# Patient Record
Sex: MALE | Race: WHITE | Employment: OTHER | URBAN - METROPOLITAN AREA
[De-identification: names, ages, dates, MRNs, and addresses within clinical notes are randomized per-mention and may not be internally consistent; named-entity substitution may affect disease eponyms.]

---

## 2017-01-09 DIAGNOSIS — N40.1 BENIGN NON-NODULAR PROSTATIC HYPERPLASIA WITH LOWER URINARY TRACT SYMPTOMS: Primary | ICD-10-CM

## 2017-01-09 RX ORDER — TAMSULOSIN HYDROCHLORIDE 0.4 MG/1
0.4 CAPSULE ORAL DAILY
Qty: 90 CAPSULE | Refills: 3 | Status: ON HOLD | OUTPATIENT
Start: 2017-01-09 | End: 2017-02-22

## 2017-01-09 NOTE — TELEPHONE ENCOUNTER
tamsulosin- patient also on Oxybutin/ finasteride      Last Written Prescription Date: patient reported    Last Office Visit with [facility], [facility] or [facility] prescribing provider:  9/1/16   Future Office Visit:        BP Readings from Last 3 Encounters:   09/19/16 93/60   09/07/16 118/60   08/13/16 114/71

## 2017-01-17 DIAGNOSIS — E78.5 HYPERLIPIDEMIA LDL GOAL <100: Primary | ICD-10-CM

## 2017-01-17 DIAGNOSIS — I25.9 CHRONIC ISCHEMIC HEART DISEASE: ICD-10-CM

## 2017-01-17 NOTE — TELEPHONE ENCOUNTER
atorvastatin (LIPITOR) 20 MG tablet     Last Written Prescription Date: 9/22/16  Last Fill Quantity: 90, # refills: 0  Last Office Visit with [facility], [facility] or [facility] prescribing provider: 9/1/16       CHOL      189   6/27/2016  HDL       41   6/27/2016  LDL      108   6/27/2016  TRIG      198   6/27/2016  CHOLHDLRATIO      2.4   6/17/2015

## 2017-01-18 RX ORDER — ATORVASTATIN CALCIUM 20 MG/1
TABLET, FILM COATED ORAL
Qty: 90 TABLET | Refills: 0 | Status: ON HOLD | OUTPATIENT
Start: 2017-01-18 | End: 2017-02-22

## 2017-02-11 PROCEDURE — 96361 HYDRATE IV INFUSION ADD-ON: CPT

## 2017-02-21 ENCOUNTER — APPOINTMENT (OUTPATIENT)
Dept: GENERAL RADIOLOGY | Facility: CLINIC | Age: 82
End: 2017-02-21
Attending: FAMILY MEDICINE
Payer: MEDICARE

## 2017-02-21 ENCOUNTER — HOSPITAL ENCOUNTER (OUTPATIENT)
Facility: CLINIC | Age: 82
Setting detail: OBSERVATION
Discharge: HOME | End: 2017-02-22
Attending: FAMILY MEDICINE | Admitting: PEDIATRICS
Payer: MEDICARE

## 2017-02-21 DIAGNOSIS — R19.7 DIARRHEA OF PRESUMED INFECTIOUS ORIGIN: ICD-10-CM

## 2017-02-21 DIAGNOSIS — N40.1 BENIGN NON-NODULAR PROSTATIC HYPERPLASIA WITH LOWER URINARY TRACT SYMPTOMS: ICD-10-CM

## 2017-02-21 DIAGNOSIS — E86.0 DEHYDRATION: ICD-10-CM

## 2017-02-21 DIAGNOSIS — J10.1 INFLUENZA A: ICD-10-CM

## 2017-02-21 DIAGNOSIS — R09.02 HYPOXIA: ICD-10-CM

## 2017-02-21 PROBLEM — I95.9 HYPOTENSION, UNSPECIFIED HYPOTENSION TYPE: Status: ACTIVE | Noted: 2017-02-21

## 2017-02-21 PROBLEM — R53.1 WEAKNESS GENERALIZED: Status: ACTIVE | Noted: 2017-02-21

## 2017-02-21 PROBLEM — R93.89 ABNORMAL CHEST X-RAY: Status: ACTIVE | Noted: 2017-02-21

## 2017-02-21 PROBLEM — R26.9 ABNORMAL GAIT: Status: ACTIVE | Noted: 2017-02-21

## 2017-02-21 PROBLEM — N17.9 ACUTE KIDNEY INJURY (H): Status: ACTIVE | Noted: 2017-02-21

## 2017-02-21 LAB
ALBUMIN SERPL-MCNC: 2.9 G/DL (ref 3.4–5)
ALBUMIN UR-MCNC: ABNORMAL MG/DL
ALP SERPL-CCNC: 99 U/L (ref 40–150)
ALT SERPL W P-5'-P-CCNC: 17 U/L (ref 0–70)
AMORPH CRY #/AREA URNS HPF: ABNORMAL /HPF
ANION GAP SERPL CALCULATED.3IONS-SCNC: 10 MMOL/L (ref 3–14)
APPEARANCE UR: CLEAR
AST SERPL W P-5'-P-CCNC: 23 U/L (ref 0–45)
BACTERIA #/AREA URNS HPF: ABNORMAL /HPF
BASE DEFICIT BLDV-SCNC: 3.7 MMOL/L
BASOPHILS # BLD AUTO: 0 10E9/L (ref 0–0.2)
BASOPHILS NFR BLD AUTO: 0.1 %
BILIRUB SERPL-MCNC: 0.6 MG/DL (ref 0.2–1.3)
BILIRUB UR QL STRIP: NEGATIVE
BUN SERPL-MCNC: 22 MG/DL (ref 7–30)
CALCIUM SERPL-MCNC: 8.1 MG/DL (ref 8.5–10.1)
CHLORIDE SERPL-SCNC: 107 MMOL/L (ref 94–109)
CK SERPL-CCNC: 484 U/L (ref 30–300)
CO2 SERPL-SCNC: 23 MMOL/L (ref 20–32)
COLOR UR AUTO: YELLOW
CREAT SERPL-MCNC: 1.37 MG/DL (ref 0.66–1.25)
DIFFERENTIAL METHOD BLD: ABNORMAL
EOSINOPHIL # BLD AUTO: 0 10E9/L (ref 0–0.7)
EOSINOPHIL NFR BLD AUTO: 0 %
ERYTHROCYTE [DISTWIDTH] IN BLOOD BY AUTOMATED COUNT: 13.6 % (ref 10–15)
FLUAV+FLUBV AG SPEC QL: ABNORMAL
FLUAV+FLUBV AG SPEC QL: POSITIVE
GFR SERPL CREATININE-BSD FRML MDRD: 49 ML/MIN/1.7M2
GLUCOSE SERPL-MCNC: 122 MG/DL (ref 70–99)
GLUCOSE UR STRIP-MCNC: NEGATIVE MG/DL
HCO3 BLDV-SCNC: 21 MMOL/L (ref 21–28)
HCT VFR BLD AUTO: 38.8 % (ref 40–53)
HGB BLD-MCNC: 13.1 G/DL (ref 13.3–17.7)
HGB UR QL STRIP: ABNORMAL
IMM GRANULOCYTES # BLD: 0 10E9/L (ref 0–0.4)
IMM GRANULOCYTES NFR BLD: 0.2 %
KETONES UR STRIP-MCNC: NEGATIVE MG/DL
LACTATE BLD-SCNC: 1.6 MMOL/L (ref 0.7–2.1)
LEUKOCYTE ESTERASE UR QL STRIP: NEGATIVE
LYMPHOCYTES # BLD AUTO: 0.6 10E9/L (ref 0.8–5.3)
LYMPHOCYTES NFR BLD AUTO: 6.3 %
MCH RBC QN AUTO: 27.8 PG (ref 26.5–33)
MCHC RBC AUTO-ENTMCNC: 33.8 G/DL (ref 31.5–36.5)
MCV RBC AUTO: 82 FL (ref 78–100)
MONOCYTES # BLD AUTO: 1.9 10E9/L (ref 0–1.3)
MONOCYTES NFR BLD AUTO: 22 %
MUCOUS THREADS #/AREA URNS LPF: PRESENT /LPF
NEUTROPHILS # BLD AUTO: 6.2 10E9/L (ref 1.6–8.3)
NEUTROPHILS NFR BLD AUTO: 71.4 %
NITRATE UR QL: NEGATIVE
O2/TOTAL GAS SETTING VFR VENT: ABNORMAL %
PCO2 BLDV: 35 MM HG (ref 40–50)
PH BLDV: 7.38 PH (ref 7.32–7.43)
PH UR STRIP: 5.5 PH (ref 5–7)
PLATELET # BLD AUTO: 153 10E9/L (ref 150–450)
PO2 BLDV: 31 MM HG (ref 25–47)
POTASSIUM SERPL-SCNC: 3.9 MMOL/L (ref 3.4–5.3)
PROT SERPL-MCNC: 6.1 G/DL (ref 6.8–8.8)
RBC # BLD AUTO: 4.71 10E12/L (ref 4.4–5.9)
RBC #/AREA URNS AUTO: ABNORMAL /HPF (ref 0–2)
SODIUM SERPL-SCNC: 140 MMOL/L (ref 133–144)
SP GR UR STRIP: >1.03 (ref 1–1.03)
SPECIMEN SOURCE: ABNORMAL
URN SPEC COLLECT METH UR: ABNORMAL
UROBILINOGEN UR STRIP-ACNC: 0.2 EU/DL (ref 0.2–1)
WBC # BLD AUTO: 8.7 10E9/L (ref 4–11)
WBC #/AREA URNS AUTO: ABNORMAL /HPF (ref 0–2)

## 2017-02-21 PROCEDURE — 71010 XR CHEST PORT 1 VW: CPT | Mod: TC

## 2017-02-21 PROCEDURE — 96360 HYDRATION IV INFUSION INIT: CPT

## 2017-02-21 PROCEDURE — 99285 EMERGENCY DEPT VISIT HI MDM: CPT | Performed by: FAMILY MEDICINE

## 2017-02-21 PROCEDURE — A9270 NON-COVERED ITEM OR SERVICE: HCPCS | Mod: GY | Performed by: FAMILY MEDICINE

## 2017-02-21 PROCEDURE — 99285 EMERGENCY DEPT VISIT HI MDM: CPT | Mod: 25

## 2017-02-21 PROCEDURE — 25000132 ZZH RX MED GY IP 250 OP 250 PS 637: Mod: GY | Performed by: FAMILY MEDICINE

## 2017-02-21 PROCEDURE — 82803 BLOOD GASES ANY COMBINATION: CPT | Performed by: FAMILY MEDICINE

## 2017-02-21 PROCEDURE — 25000125 ZZHC RX 250: Performed by: PEDIATRICS

## 2017-02-21 PROCEDURE — 96361 HYDRATE IV INFUSION ADD-ON: CPT

## 2017-02-21 PROCEDURE — 99220 ZZC INITIAL OBSERVATION CARE,LEVL III: CPT | Performed by: PEDIATRICS

## 2017-02-21 PROCEDURE — 87804 INFLUENZA ASSAY W/OPTIC: CPT | Performed by: FAMILY MEDICINE

## 2017-02-21 PROCEDURE — 80053 COMPREHEN METABOLIC PANEL: CPT | Performed by: FAMILY MEDICINE

## 2017-02-21 PROCEDURE — 85025 COMPLETE CBC W/AUTO DIFF WBC: CPT | Performed by: FAMILY MEDICINE

## 2017-02-21 PROCEDURE — G0378 HOSPITAL OBSERVATION PER HR: HCPCS

## 2017-02-21 PROCEDURE — 25000128 H RX IP 250 OP 636: Performed by: FAMILY MEDICINE

## 2017-02-21 PROCEDURE — 87086 URINE CULTURE/COLONY COUNT: CPT | Performed by: FAMILY MEDICINE

## 2017-02-21 PROCEDURE — 82550 ASSAY OF CK (CPK): CPT | Performed by: PEDIATRICS

## 2017-02-21 PROCEDURE — 84145 PROCALCITONIN (PCT): CPT | Performed by: FAMILY MEDICINE

## 2017-02-21 PROCEDURE — 87040 BLOOD CULTURE FOR BACTERIA: CPT | Performed by: FAMILY MEDICINE

## 2017-02-21 PROCEDURE — 83605 ASSAY OF LACTIC ACID: CPT | Performed by: FAMILY MEDICINE

## 2017-02-21 PROCEDURE — 81001 URINALYSIS AUTO W/SCOPE: CPT | Performed by: FAMILY MEDICINE

## 2017-02-21 RX ORDER — BUPROPION HYDROCHLORIDE 150 MG/1
150 TABLET, EXTENDED RELEASE ORAL 2 TIMES DAILY
Status: DISCONTINUED | OUTPATIENT
Start: 2017-02-21 | End: 2017-02-22 | Stop reason: HOSPADM

## 2017-02-21 RX ORDER — OSELTAMIVIR PHOSPHATE 30 MG/1
30 CAPSULE ORAL 2 TIMES DAILY
Status: DISCONTINUED | OUTPATIENT
Start: 2017-02-21 | End: 2017-02-22 | Stop reason: HOSPADM

## 2017-02-21 RX ORDER — SODIUM CHLORIDE 9 MG/ML
INJECTION, SOLUTION INTRAVENOUS CONTINUOUS
Status: DISCONTINUED | OUTPATIENT
Start: 2017-02-21 | End: 2017-02-21

## 2017-02-21 RX ORDER — LIDOCAINE 40 MG/G
CREAM TOPICAL
Status: DISCONTINUED | OUTPATIENT
Start: 2017-02-21 | End: 2017-02-22 | Stop reason: HOSPADM

## 2017-02-21 RX ORDER — NITROGLYCERIN 0.4 MG/1
0.4 TABLET SUBLINGUAL EVERY 5 MIN PRN
Status: DISCONTINUED | OUTPATIENT
Start: 2017-02-21 | End: 2017-02-21

## 2017-02-21 RX ORDER — SODIUM CHLORIDE AND POTASSIUM CHLORIDE 150; 450 MG/100ML; MG/100ML
INJECTION, SOLUTION INTRAVENOUS CONTINUOUS
Status: DISCONTINUED | OUTPATIENT
Start: 2017-02-21 | End: 2017-02-22

## 2017-02-21 RX ORDER — HYDROCODONE BITARTRATE AND ACETAMINOPHEN 5; 325 MG/1; MG/1
1 TABLET ORAL EVERY 8 HOURS PRN
Status: DISCONTINUED | OUTPATIENT
Start: 2017-02-21 | End: 2017-02-21

## 2017-02-21 RX ORDER — NALOXONE HYDROCHLORIDE 0.4 MG/ML
.1-.4 INJECTION, SOLUTION INTRAMUSCULAR; INTRAVENOUS; SUBCUTANEOUS
Status: DISCONTINUED | OUTPATIENT
Start: 2017-02-21 | End: 2017-02-21

## 2017-02-21 RX ORDER — LIDOCAINE 40 MG/G
CREAM TOPICAL
Status: DISCONTINUED | OUTPATIENT
Start: 2017-02-21 | End: 2017-02-21

## 2017-02-21 RX ADMIN — BUPROPION HYDROCHLORIDE 150 MG: 150 TABLET, FILM COATED, EXTENDED RELEASE ORAL at 20:57

## 2017-02-21 RX ADMIN — OSELTAMIVIR PHOSPHATE 30 MG: 30 CAPSULE ORAL at 13:00

## 2017-02-21 RX ADMIN — OSELTAMIVIR PHOSPHATE 30 MG: 30 CAPSULE ORAL at 20:57

## 2017-02-21 RX ADMIN — SODIUM CHLORIDE 3063 ML: 9 INJECTION, SOLUTION INTRAVENOUS at 11:17

## 2017-02-21 RX ADMIN — SODIUM CHLORIDE 1000 ML: 9 INJECTION, SOLUTION INTRAVENOUS at 09:40

## 2017-02-21 RX ADMIN — POTASSIUM CHLORIDE AND SODIUM CHLORIDE: 450; 150 INJECTION, SOLUTION INTRAVENOUS at 19:17

## 2017-02-21 RX ADMIN — SODIUM CHLORIDE: 9 INJECTION, SOLUTION INTRAVENOUS at 09:01

## 2017-02-21 RX ADMIN — SODIUM CHLORIDE 1000 ML: 9 INJECTION, SOLUTION INTRAVENOUS at 12:22

## 2017-02-21 RX ADMIN — SODIUM CHLORIDE: 9 INJECTION, SOLUTION INTRAVENOUS at 14:08

## 2017-02-21 RX ADMIN — SODIUM CHLORIDE: 9 INJECTION, SOLUTION INTRAVENOUS at 12:59

## 2017-02-21 ASSESSMENT — ENCOUNTER SYMPTOMS
LIGHT-HEADEDNESS: 1
DIFFICULTY URINATING: 0
DIARRHEA: 1
COUGH: 1
SEIZURES: 0
SORE THROAT: 0
SPEECH DIFFICULTY: 0
MYALGIAS: 0
FEVER: 1
NECK PAIN: 0
VOMITING: 0
DYSURIA: 0
PALPITATIONS: 0
TROUBLE SWALLOWING: 0
BACK PAIN: 0
ARTHRALGIAS: 0
SHORTNESS OF BREATH: 0
ABDOMINAL PAIN: 0
WEAKNESS: 1
NAUSEA: 0
ACTIVITY CHANGE: 1
DIZZINESS: 1
CHILLS: 1
ABDOMINAL DISTENTION: 0
STRIDOR: 0

## 2017-02-21 NOTE — ED NOTES
Pt comes in via EMS with complaints of diarrhea and a cough for the last few days. Pts was hypotensive in route. Pt also complains of weakness.

## 2017-02-21 NOTE — ED NOTES
ED Nursing criteria listed below was addressed during verbal handoff:     Abnormal vitals: No  Abnormal results: Yes- influenza A  Med Reconciliation completed: No- Med/surg RN notified. Pt and wife unable to give correct medications, doses, and times  Meds given in ED: Yes  Any Overdue Meds: No  Core Measures: Yes  Isolation: Yes  Special needs: No  Skin assessment: Yes- no skin issues noticed    Observation Patient  Education provided: No- Message left for wife, flyer placed in pts belonging bag.

## 2017-02-21 NOTE — ED NOTES
Cleaned stool off of patient's tanika area and extremities.  Antifungal cream applied to right groin area.  Positioned in a comfortable position and warm blankets applied.

## 2017-02-21 NOTE — IP AVS SNAPSHOT
MRN:[id number]                      After Visit Summary   2/21/2017    Dean Mccarty    MRN: [id number]           Thank you!     Thank you for choosing [facility] for your care. Our goal is always to provide you with excellent care. Hearing back from our patients is one way we can continue to improve our services. Please take a few minutes to complete the written survey that you may receive in the mail after you visit with us. Thank you!        Patient Information     Date Of Birth          7/6/1928        About your hospital stay     You were admitted on:  February 21, 2017 You last received care in the:  [address] [facility]    You were discharged on:  February 22, 2017        Reason for your hospital stay       Hospitalized due to influenza A, low blood pressure and weakness and improved                  Who to Call     For medical emergencies, please call 911.  For non-urgent questions about your medical care, please call your primary care provider or clinic, [phone]          Attending Provider     Provider Specialty    Wilmer Crain MD [facility]    Hernandez Beasley MD Internal Medicine       Primary Care Provider Office Phone # Fax #    Leobardo Yoo [phone]       [address] [place] [address]        After Care Instructions     Activity       Your activity upon discharge: activity as tolerated, use walker with ambulation            Diet       Follow this diet upon discharge: Orders Placed This Encounter      Advance Diet as Tolerated: Regular Diet Adult                  Follow-up Appointments     Follow-up and recommended labs and tests        Follow up with primary care provider, Leobardo Yoo, within 2 weeks, for hospital follow- up.                  Your next 10 appointments already scheduled     Mar 06, 2017 10:20 AM CST   SHORT with DO Tiffanie Rust  "[facility] ([facility])    [address] [place] [id number]   [phone]              Pending Results     Date and Time Order Name Status Description    2017 0925 Procalcitonin In process     2017 0915 Blood culture In process     2017 0915 Blood culture In process     2017 0915 Urine Culture Aerobic Bacterial Preliminary             Statement of Approval     Ordered          17 1215  I have reviewed and agree with all the recommendations and orders detailed in this document.  EFFECTIVE NOW     Approved and electronically signed by:  Hernandez Beasley MD             Admission Information     Date & Time Provider Department Dept. Phone    2017 Hernandez Beasley MD [address] [facility] [phone]      Your Vitals Were     Blood Pressure Pulse Temperature Respirations Height Weight    121/52 (BP Location: Left leg) 70 98  F (36.7  C) (Oral) 18 1.829 m (6') 100.5 kg (221 lb 9 oz)    Pulse Oximetry BMI (Body Mass Index)                97% 30.05 kg/m2          [facility] [employer] Information     [employer] lets you send messages to your doctor, view your test results, renew your prescriptions, schedule appointments and more. To sign up, go to [email or web address] . Click on \"Log in\" on the left side of the screen, which will take you to the Welcome page. Then click on \"Sign up Now\" on the right side of the page.     You will be asked to enter the access code listed below, as well as some personal information. Please follow the directions to create your username and password.     Your access code is: [email or web address]  Expires: 2017  1:32 PM     Your access code will  in 90 days. If you need help or a new code, please call your [place] clinic or [phone].        Care EveryWhere ID     This is your Care EveryWhere ID. This could be used by other organizations to access your [place] medical records  [id number]           Review of your " medicines      START taking        Dose / Directions    oseltamivir 30 MG capsule   Commonly known as:  TAMIFLU   Indication:  Flu        Dose:  30 mg   Take 1 capsule (30 mg) by mouth 2 times daily for 7 doses   Quantity:  7 capsule   Refills:  0         CONTINUE these medicines which may have CHANGED, or have new prescriptions. If we are uncertain of the size of tablets/capsules you have at home, strength may be listed as something that might have changed.        Dose / Directions    tamsulosin 0.4 MG capsule   Commonly known as:  FLOMAX   This may have changed:  how much to take   Used for:  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 tract symptoms        Dose:  0.8 mg   Take 2 capsules (0.8 mg) by mouth daily   Quantity:  90 capsule   Refills:  3         CONTINUE these medicines which have NOT CHANGED        Dose / Directions    aspirin 81 MG tablet        Dose:  1 tablet   Take 1 tablet by mouth daily.   Refills:  0       buPROPion 150 MG 12 hr tablet   Commonly known as:  WELLBUTRIN SR   Used for:  Major depressive disorder, recurrent episode, mild (H)        TAKE ONE TABLET BY MOUTH TWICE A DAY   Quantity:  60 tablet   Refills:  2       finasteride 5 MG tablet   Commonly known as:  PROSCAR   Used for:  Hyperplasia of prostate        TAKE ONE TABLET BY MOUTH ONCE DAILY   Quantity:  30 tablet   Refills:  5       fluticasone 50 MCG/ACT spray   Commonly known as:  FLONASE   Used for:  Seasonal allergic rhinitis, unspecified allergic rhinitis trigger        INHALE 1-2 SPRAYS IN EACH NOSTRIL ONCE DAILY   Quantity:  16 g   Refills:  5       HYDROcodone-acetaminophen 5-325 MG per tablet   Commonly known as:  NORCO   Used for:  Compression fracture        Dose:  1 tablet   Take 1 tablet by mouth every 8 hours as needed for moderate to severe pain   Quantity:  42 tablet   Refills:  0       ibuprofen 600 MG tablet   Commonly known as:  ADVIL/MOTRIN   Used for:  Acute pharyngitis due to other specified  organisms        Dose:  600 mg   Take 1 tablet (600 mg) by mouth every 6 hours as needed for other (mild pain)   Quantity:  40 tablet   Refills:  0       loratadine 10 MG tablet   Commonly known as:  CLARITIN   Used for:  Bilateral otitis media with spontaneous rupture of eardrum        TAKE ONE TABLET BY MOUTH ONCE DAILY   Quantity:  30 tablet   Refills:  9       meclizine 25 MG tablet   Commonly known as:  ANTIVERT   Used for:  Bilateral acute suppurative otitis media        TAKE ONE TABLET BY MOUTH EVERY 6 HOURS AS NEEDED FOR DIZZINESS   Quantity:  30 tablet   Refills:  9       nitroglycerin 0.4 MG sublingual tablet   Commonly known as:  NITROSTAT   Used for:  Chronic ischemic heart disease, unspecified        Dose:  0.4 mg   Place 1 tablet (0.4 mg) under the tongue every 5 minutes as needed for chest pain If you are still having symptoms after 3 doses (15 minutes) call 911.   Quantity:  25 tablet   Refills:  3       omeprazole 20 MG CR capsule   Commonly known as:  priLOSEC   Used for:  Gastroesophageal reflux disease without esophagitis        TAKE ONE CAPSULE BY MOUTH ONCE DAILY   Quantity:  90 capsule   Refills:  3       order for DME        Dose:  1 Device   1 Device Respironics System One Auto CPAP @ 8 cm .   Refills:  0       oxybutynin 5 MG tablet   Commonly known as:  DITROPAN   Used for:  Hyperplasia of prostate        TAKE ONE TABLET BY MOUTH TWICE A DAY   Quantity:  60 tablet   Refills:  5       polyethylene glycol powder   Commonly known as:  MIRALAX/GLYCOLAX   Used for:  Slow transit constipation        TAKE 17 GRAMS (1 CAPFUL) BY MOUTH TWO TIMES A DAY   Quantity:  510 g   Refills:  5       ranitidine 300 MG tablet   Commonly known as:  ZANTAC   Used for:  Gastroesophageal reflux disease without esophagitis        TAKE ONE TABLET BY MOUTH AT BEDTIME   Quantity:  90 tablet   Refills:  3       sertraline 100 MG tablet   Commonly known as:  ZOLOFT   Used for:  Major depressive disorder, recurrent  episode, mild (H)        TAKE ONE TABLET BY MOUTH ONCE DAILY   Quantity:  90 tablet   Refills:  0       vitamin E 400 UNIT capsule        AS DIRECTED   Quantity:  QS   Refills:  0         STOP taking     atorvastatin 20 MG tablet   Commonly known as:  LIPITOR           lidocaine 5 % Patch   Commonly known as:  LIDODERM           lisinopril 5 MG tablet   Commonly known as:  PRINIVIL/ZESTRIL           terazosin 5 MG capsule   Commonly known as:  HYTRIN                Where to get your medicines      These medications were sent to [facility] [address] [place] [address] - [address]  [address], [facility] [address]     Phone:  [phone]     oseltamivir 30 MG capsule                Protect others around you: Learn how to safely use, store and throw away your medicines at www.disposemymeds.org.             Medication List: This is a list of all your medications and when to take them. Check marks below indicate your daily home schedule. Keep this list as a reference.      Medications           Morning Afternoon Evening Bedtime As Needed    aspirin 81 MG tablet   Take 1 tablet by mouth daily.                                   buPROPion 150 MG 12 hr tablet   Commonly known as:  WELLBUTRIN SR   TAKE ONE TABLET BY MOUTH TWICE A DAY   Last time this was given:  150 mg on 2/22/2017  8:02 AM                                      finasteride 5 MG tablet   Commonly known as:  PROSCAR   TAKE ONE TABLET BY MOUTH ONCE DAILY                                   fluticasone 50 MCG/ACT spray   Commonly known as:  FLONASE   INHALE 1-2 SPRAYS IN EACH NOSTRIL ONCE DAILY                                   HYDROcodone-acetaminophen 5-325 MG per tablet   Commonly known as:  NORCO   Take 1 tablet by mouth every 8 hours as needed for moderate to severe pain                                   ibuprofen 600 MG tablet   Commonly known as:  ADVIL/MOTRIN   Take 1 tablet (600 mg) by mouth every 6 hours as needed for other (mild pain)                                    loratadine 10 MG tablet   Commonly known as:  CLARITIN   TAKE ONE TABLET BY MOUTH ONCE DAILY                                   meclizine 25 MG tablet   Commonly known as:  ANTIVERT   TAKE ONE TABLET BY MOUTH EVERY 6 HOURS AS NEEDED FOR DIZZINESS                                   nitroglycerin 0.4 MG sublingual tablet   Commonly known as:  NITROSTAT   Place 1 tablet (0.4 mg) under the tongue every 5 minutes as needed for chest pain If you are still having symptoms after 3 doses (15 minutes) call 911.                                   omeprazole 20 MG CR capsule   Commonly known as:  priLOSEC   TAKE ONE CAPSULE BY MOUTH ONCE DAILY                                   order for DME   1 Device Respironics System One Auto CPAP @ 8 cm .                                oseltamivir 30 MG capsule   Commonly known as:  TAMIFLU   Take 1 capsule (30 mg) by mouth 2 times daily for 7 doses   Last time this was given:  30 mg on 2/22/2017  8:02 AM                                      oxybutynin 5 MG tablet   Commonly known as:  DITROPAN   TAKE ONE TABLET BY MOUTH TWICE A DAY                                      polyethylene glycol powder   Commonly known as:  MIRALAX/GLYCOLAX   TAKE 17 GRAMS (1 CAPFUL) BY MOUTH TWO TIMES A DAY                                      ranitidine 300 MG tablet   Commonly known as:  ZANTAC   TAKE ONE TABLET BY MOUTH AT BEDTIME                                   sertraline 100 MG tablet   Commonly known as:  ZOLOFT   TAKE ONE TABLET BY MOUTH ONCE DAILY                                   tamsulosin 0.4 MG capsule   Commonly known as:  FLOMAX   Take 2 capsules (0.8 mg) by mouth daily                                   vitamin E 400 UNIT capsule   AS DIRECTED                                          More Information        Patient Education    Oseltamivir Phosphate Oral capsule    Oseltamivir Phosphate Oral suspension  Oseltamivir Phosphate Oral capsule  What is this medicine?  OSELTAMIVIR (os el LARKIN i  vir) is an antiviral medicine. It is used to prevent and to treat some kinds of influenza or the flu. It will not work for colds or other viral infections.  This medicine may be used for other purposes; ask your health care provider or pharmacist if you have questions.  What should I tell my health care provider before I take this medicine?  They need to know if you have any of the following conditions:    heart disease    immune system problems    kidney disease    liver disease    lung disease    an unusual or allergic reaction to oseltamivir, other medicines, foods, dyes, or preservatives    pregnant or trying to get pregnant    breast-feeding  How should I use this medicine?  Take this medicine by mouth with a glass of water. Follow the directions on the prescription label. Start this medicine at the first sign of flu symptoms. You can take it with or without food. If it upsets your stomach, take it with food. Take your medicine at regular intervals. Do not take your medicine more often than directed. Take all of your medicine as directed even if you think you are better. Do not skip doses or stop your medicine early.  Talk to your pediatrician regarding the use of this medicine in children. While this drug may be prescribed for children as young as 14 days for selected conditions, precautions do apply.  Overdosage: If you think you have taken too much of this medicine contact a poison control center or emergency room at once.  NOTE: This medicine is only for you. Do not share this medicine with others.  What if I miss a dose?  If you miss a dose, take it as soon as you remember. If it is almost time for your next dose (within 2 hours), take only that dose. Do not take double or extra doses.  What may interact with this medicine?  Interactions are not expected.  This list may not describe all possible interactions. Give your health care provider a list of all the medicines, herbs, non-prescription drugs, or  dietary supplements you use. Also tell them if you smoke, drink alcohol, or use illegal drugs. Some items may interact with your medicine.  What should I watch for while using this medicine?  Visit your doctor or health care professional for regular check ups. Tell your doctor if your symptoms do not start to get better or if they get worse.  If you have the flu, you may be at an increased risk of developing seizures, confusion, or abnormal behavior. This occurs early in the illness, and more frequently in children and teens. These events are not common, but may result in accidental injury to the patient. Families and caregivers of patients should watch for signs of unusual behavior and contact a doctor or health care professional right away if the patient shows signs of unusual behavior.  This medicine is not a substitute for the flu shot. Talk to your doctor each year about an annual flu shot.  What side effects may I notice from receiving this medicine?  Side effects that you should report to your doctor or health care professional as soon as possible:    allergic reactions like skin rash, itching or hives, swelling of the face, lips, or tongue    anxiety, confusion, unusual behavior    breathing problems    hallucination, loss of contact with reality    redness, blistering, peeling or loosening of the skin, including inside the mouth    seizures  Side effects that usually do not require medical attention (report to your doctor or health care professional if they continue or are bothersome):    cough    diarrhea    dizziness    headache    nausea, vomiting    stomach pain  This list may not describe all possible side effects. Call your doctor for medical advice about side effects. You may report side effects to FDA at 2-226-XAW-7848.  Where should I keep my medicine?  Keep out of the reach of children.  Store at room temperature between 15 and 30 degrees C (59 and 86 degrees F). Throw away any unused medicine  after the expiration date.  NOTE:This sheet is a summary. It may not cover all possible information. If you have questions about this medicine, talk to your doctor, pharmacist, or health care provider. Copyright  2016 [employer]

## 2017-02-21 NOTE — PROGRESS NOTES
"S-(situation): Patient registered to Observation. Patient arrived to room 250 via cart from ED    B-(background): Weakness, Positive influenza    A-(assessment): A&Ox3. VSS. Denies any pain or shortness of breath. Coccyx red but blanchable. Complaints of \"very weak, unable to get back in bed at home when got up\".     R-(recommendations): Orders and observation goals reviewed with patient and son. Video shown to both.    Nursing Observation criteria listed below was met:    Skin issues/needs documented:NA  Isolation needs addressed, if appropriate: Yes  Fall Prevention: Education given and documented and signage used: Yes  Education Assessment documented:Yes  Education Documented (Pre-existing chronic infection such as, MRSA/VRE need education on admission): Yes  New medication patient education completed and documented (Possible Side Effects of Common Medications handout): na  Home medications if not able to send immediately home with family stored here: NA  Reminder note placed in discharge instructions: NA  Patient has discharge needs (If yes, please explain): Yes- may need TCU secondary to weakness  Elier Wilson RN                "

## 2017-02-21 NOTE — ED PROVIDER NOTES
History     Chief Complaint   Patient presents with     Diarrhea     The history is provided by the patient and medical records.     Dean Mccarty is a 88 year old male who is here by ambulance.  He lives in a home with his wife.  She called the ambulance after he had an episode of diarrhea.  He's been feverish and sick now for a couple of days.  He's had some cough.  Today he tells me he is so weak that if he got off the bed he would not be able to get back on the bed.    I have reviewed the Medications, Allergies, Past Medical and Surgical History, and Social History in the Epic system.    Review of Systems   Constitutional: Positive for activity change, chills and fever.   HENT: Negative for ear pain, sore throat and trouble swallowing.    Respiratory: Positive for cough. Negative for shortness of breath and stridor.    Cardiovascular: Negative for chest pain and palpitations.   Gastrointestinal: Positive for diarrhea. Negative for abdominal distention, abdominal pain, nausea and vomiting.   Genitourinary: Negative for difficulty urinating and dysuria.   Musculoskeletal: Negative for arthralgias, back pain, myalgias and neck pain.   Skin: Negative for rash.   Neurological: Positive for dizziness, weakness and light-headedness. Negative for seizures and speech difficulty.   All other systems reviewed and are negative.      Physical Exam   BP: (!) 79/62  Pulse: 98  Temp: 100.8  F (38.2  C)  Resp: 16  Weight: 102.1 kg (225 lb)  SpO2: 93 %    Physical Exam   Constitutional: He is oriented to person, place, and time. He appears well-developed and well-nourished. No distress.   HENT:   Head: Normocephalic and atraumatic.   Right Ear: External ear normal.   Left Ear: External ear normal.   Nose: Nose normal.   Mouth/Throat: Oropharynx is clear and moist.   Eyes: Conjunctivae and EOM are normal.   Neck: Normal range of motion. Neck supple.   Cardiovascular: Normal rate, regular rhythm, normal heart sounds and  intact distal pulses.    No murmur heard.  Pulmonary/Chest: Effort normal and breath sounds normal. No respiratory distress. He has no wheezes. He has no rales.   Abdominal: Soft. Bowel sounds are normal. He exhibits no distension. There is no tenderness. There is no rebound and no guarding.   Musculoskeletal: He exhibits no edema or tenderness.   Lymphadenopathy:     He has no cervical adenopathy.   Neurological: He is alert and oriented to person, place, and time.   Skin: Skin is warm and dry. No rash noted. He is not diaphoretic. No erythema.   Psychiatric: He has a normal mood and affect. Thought content normal.       ED Course  12:36 PM  He has influenza A and is currently on 2 L of oxygen.  The patient has had diarrhea and came into the emergency department covered with stool from his waist down to his feet.  His initial blood pressure was 84/55 and 79/62 with a temperature 100.8.  We have given him fluids here in the ED and are starting Tamiflu.  I will speak to Dr. Beasley about this patient.       ED Course     Procedures            Results for orders placed or performed during the hospital encounter of 02/21/17 (from the past 24 hour(s))   CBC with platelets differential   Result Value Ref Range    WBC 8.7 4.0 - 11.0 10e9/L    RBC Count 4.71 4.4 - 5.9 10e12/L    Hemoglobin 13.1 (L) 13.3 - 17.7 g/dL    Hematocrit 38.8 (L) 40.0 - 53.0 %    MCV 82 78 - 100 fl    MCH 27.8 26.5 - 33.0 pg    MCHC 33.8 31.5 - 36.5 g/dL    RDW 13.6 10.0 - 15.0 %    Platelet Count 153 150 - 450 10e9/L    Diff Method Automated Method     % Neutrophils 71.4 %    % Lymphocytes 6.3 %    % Monocytes 22.0 %    % Eosinophils 0.0 %    % Basophils 0.1 %    % Immature Granulocytes 0.2 %    Absolute Neutrophil 6.2 1.6 - 8.3 10e9/L    Absolute Lymphocytes 0.6 (L) 0.8 - 5.3 10e9/L    Absolute Monocytes 1.9 (H) 0.0 - 1.3 10e9/L    Absolute Eosinophils 0.0 0.0 - 0.7 10e9/L    Absolute Basophils 0.0 0.0 - 0.2 10e9/L    Abs Immature Granulocytes 0.0  0 - 0.4 10e9/L   Comprehensive metabolic panel   Result Value Ref Range    Sodium 140 133 - 144 mmol/L    Potassium 3.9 3.4 - 5.3 mmol/L    Chloride 107 94 - 109 mmol/L    Carbon Dioxide 23 20 - 32 mmol/L    Anion Gap 10 3 - 14 mmol/L    Glucose 122 (H) 70 - 99 mg/dL    Urea Nitrogen 22 7 - 30 mg/dL    Creatinine 1.37 (H) 0.66 - 1.25 mg/dL    GFR Estimate 49 (L) >60 mL/min/1.7m2    GFR Estimate If Black 59 (L) >60 mL/min/1.7m2    Calcium 8.1 (L) 8.5 - 10.1 mg/dL    Bilirubin Total 0.6 0.2 - 1.3 mg/dL    Albumin 2.9 (L) 3.4 - 5.0 g/dL    Protein Total 6.1 (L) 6.8 - 8.8 g/dL    Alkaline Phosphatase 99 40 - 150 U/L    ALT 17 0 - 70 U/L    AST 23 0 - 45 U/L   Lactic acid whole blood   Result Value Ref Range    Lactic Acid 1.6 0.7 - 2.1 mmol/L   Blood gas venous   Result Value Ref Range    Ph Venous 7.38 7.32 - 7.43 pH    PCO2 Venous 35 (L) 40 - 50 mm Hg    PO2 Venous 31 25 - 47 mm Hg    Bicarbonate Venous 21 21 - 28 mmol/L    Base Deficit Venous 3.7 mmol/L    FIO2 28%    XR Chest Port 1 View    Narrative    XR CHEST PORT 1 VW 2/21/2017 10:06 AM    COMPARISON: 8/1/2016    HISTORY: Fever      Impression    IMPRESSION: Mild right basilar airspace opacity, concerning for  infection or aspiration. No pleural effusion or pneumothorax.    SANJUANA SPAIN   UA with Microscopic   Result Value Ref Range    Color Urine Yellow     Appearance Urine Clear     Glucose Urine Negative NEG mg/dL    Bilirubin Urine Negative NEG    Ketones Urine Negative NEG mg/dL    Specific Gravity Urine >1.030 1.003 - 1.035    pH Urine 5.5 5.0 - 7.0 pH    Protein Albumin Urine Trace (A) NEG mg/dL    Urobilinogen Urine 0.2 0.2 - 1.0 EU/dL    Nitrite Urine Negative NEG    Blood Urine Trace (A) NEG    Leukocyte Esterase Urine Negative NEG    Source Midstream Urine     WBC Urine O - 2 0 - 2 /HPF    RBC Urine O - 2 0 - 2 /HPF    Bacteria Urine Few (A) NEG /HPF    Amorphous Crystals Few (A) NEG /HPF    Mucous Urine Present (A) NEG /LPF   Influenza A/B antigen    Result Value Ref Range    Influenza A/B Agn Specimen Nasopharyngeal     Influenza A Positive (A) NEG    Influenza B  NEG     Negative   Test results must be correlated with clinical data. If necessary, results   should be confirmed by a molecular assay or viral culture.         Medications   0.9% sodium chloride infusion (1,000 mLs Intravenous Canceled Entry 2/21/17 1221)   lidocaine 1 % 1 mL (not administered)   lidocaine (LMX4) kit (not administered)   sodium chloride (PF) 0.9% PF flush 3 mL (not administered)   sodium chloride (PF) 0.9% PF flush 3 mL (not administered)   0.9% sodium chloride infusion (not administered)   oseltamivir (TAMIFLU) capsule 30 mg (not administered)   0.9% sodium chloride BOLUS (1,000 mLs Intravenous New Bag 2/21/17 1222)         Assessments & Plan (with Medical Decision Making)  Dean is an 88-year-old male here by ambulance.  He's been sick for a couple of days and today had diarrhea.  Here in the ED he's got diarrhea on his lower half.  EMS reported systolic pressure of 88 with a pulse of 108.  Here in the ED we bolused 1 L of fluid immediately and currently he has a blood pressure of 116/51 with a pulse of 80.  The patient appears septic.  We started the sepsis protocol.   He is not on daily steroids.  His labs are positive for influenza A.  The patient has had over 2000 mL of fluid here in the ED, he has diarrhea and he is currently on oxygen by nasal cannula.  I spoke with Dr. Beasley and he would like to make this patient an observation patient initially.  I will write orders.       I have reviewed the nursing notes.    I have reviewed the findings, diagnosis, plan and need for follow up with the patient.    New Prescriptions    No medications on file       Final diagnoses:   Influenza A   Hypoxia   Diarrhea of presumed infectious origin   Dehydration       2/21/2017   [facility] EMERGENCY DEPARTMENT     Wilmer Crain MD  02/21/17 [id number]

## 2017-02-21 NOTE — H&P
[facility] Observation History and Physical    Dean Mccarty MRN# [id number]   Age: 88 year old YOB: 1928     Date of Observation:  2/21/2017    Home clinic: [facility]  Primary care provider: Leobardo Yoo          Assessment and Plan:   Assessment:   88-year-old man with hypertension and enlarged prostate now presenting with severe generalized weakness and associated acute functional decline.  He appears to have acute influenza A infection and was severely hypotensive at initial presentation probably due to a combination of acute infection and chronic antihypertensive medication.  However, sepsis, including viral sepsis, is possible with fever and hypotension.  He has had recent cough with infiltrate identified on chest x-ray although this could be due to influenza.  He does not have leukocytosis which is also more consistent with viral infection than bacterial infection.  Hypotension appears to be responding to IV fluid resuscitation and withholding his chronic antihypertensive medications so far, and lactic acid level was normal.  Creatinine is higher than his usual baseline which could be consistent with early stage I acute kidney injury, but it is unclear whether this is a consequence of infection and sepsis, renal hypoperfusion from hypotension, or urinary retention associated with chronically enlarged prostate.  His severe generalized weakness was probably due to a combination of acute infection and severe hypotension.  So far, definite localizing neurologic deficits are not evident, and neurologic presentation is not suspicious for meningoencephalitis, spinal myelitis, or ascending polyneuropathy associated with influenza infection.  He does take a statin chronically, so myopathy is possible.  Because of the acuity, rapid progression, and severity of his weakness and functional decline, as well as uncertainty as to whether sepsis is present,  hospitalization is considered medically necessary to expedite diagnostic evaluation and treatment.  Based on the presently available information, hospitalization for at least one midnight is anticipated, and he may necessitate a longer duration of hospitalization.     Influenza A    Weakness generalized    Hypotension, unspecified hypotension type    Abnormal chest x-ray - infiltrate right base    Abnormal gait    Possible acute kidney injury (H) - admit Cr 1.37, baseline 1.09    Hypertension goal BP (blood pressure) < 140/80    Hyperplasia of prostate    * No resolved hospital problems. *         Plan:   [place] to observation for now although would not hesitate to admit as an inpatient if he deteriorates with recurrent hypotension or fever or other concerns that increase suspicion for possible bacterial sepsis  Continue IV fluids but switch from normal saline to half-normal saline with potassium chloride   Hold chronic vasoactive medications including lisinopril and terazosin  Treat influenza with Tamiflu   Will not yet add antibiotics empirically but would start antibiotics if blood or urine cultures are positive or if there are other increasing concerns for bacterial sepsis   Follow neurologic status closely with neuro checks, reconsider additional diagnostic neurologic evaluation such as radiographic imaging if his neurologic status deteriorates   Check CK level and pro-calcitonin   Administer oxygen if necessary and would reconsider inpatient admission if he begins to require oxygen   Collect sputum sample for culture if able   Advance diet and activity as tolerated, anticipate PT evaluation tomorrow to assist with disposition planning   Continue Ordonez catheter that was placed in the emergency room today for now, anticipate removal of Ordonez catheter with voiding trial prior to discharge once he is able to stand and ambulate   Reconsider adding VTE prophylaxis within the next 24 hours if he is not able to  advance activity   Discussed with the patient today            Chief Complaint:   Severe weakness    History is obtained from the patient, electronic health record and emergency department physician     Patient reports that he was in his usual health until yesterday when he noted abrupt onset of weakness.  He got up to walk within his house and stumbled.  This was noticeable to his son who asked whether he wanted to come to the hospital for evaluation at that time.  After stumbling, he realized that he felt very weak in his legs.  This weakness rapidly progressed overnight such that he was unable to attempt to bear weight on his legs overnight and this morning.  With difficulty, he was able to use his arms to reposition himself in bed and sit at the edge of the bed and was able to maintain his balance and posture while sitting, but he preferred to lie down because of weakness.  When he developed urge to move his bowels, he was so weak that he was not able to get out of bed and lost control of loose diarrheal stool with fecal incontinence.  When he developed urge to urinate, he was unable to get to the bathroom due to leg weakness and also had urinary incontinence.  He thought he might be able to crawl on his hands and knees but did not attempt to do so.  Because of the severity of his weakness today, he asked his family to help him get to the emergency room for evaluation.  Upon arrival in the emergency room, he was noted to be severely hypotensive and febrile.  He was treated with IV fluids for a total of about 3-4 L with rapid return of blood pressure to the normal range, and fever subsided.  After he was discovered to have influenza A, he was also administered Tamiflu in the ER.  However, he continued to be so weak that he could not bear weight on his legs.  A Ordonez catheter was placed in the emergency room.  Hospitalization was recommended for observation, and he is now seen in his hospital room for evaluation.               Past Medical History:   I have reviewed this patient's past medical history  Past Medical History   Diagnosis Date     Chronic ischemic heart disease, unspecified      Coronary artery dis     Depressive disorder, not elsewhere classified      Unspecified essential hypertension      Unspecified hyperplasia of prostate      Weakness generalized 2/21/2017      He denies any history of heart disease including ischemic heart disease or heart attack.  Most recent stress echocardiogram in 2016 did not demonstrate any inducible ischemia or signs of old myocardial infarction.  He denies any history of other heart disease including dysrhythmia or CHF.    He denies any history of lung disease.    He denies any history of neurologic disease including previous stroke.         Past Surgical History:    I have reviewed this patient's past surgical history  Past Surgical History   Procedure Laterality Date     Hc remove tonsils/adenoids,<11 y/o       T & A <12y.o.     C anesth,dx arthroscopic proc knee joint  12/12/05     Left knee, with partial meniscectomy.     Hc ugi endoscopy diag w biopsy  08/25/09             Social History:   TOBACCO:    History   Smoking Status     Former Smoker     Packs/day: 2.00     Years: 25.00     Types: Cigarettes     Quit date: 1/1/1970   Smokeless Tobacco     Never Used     ETOH:      Alcohol use No   Comment: rarely     ACTIVITIES OF DAILY LIVING:  Patient is normally able to perform all activities of daily living.  INSTRUMENTAL ACTIVITIES OF DAILY LIVING:  Patient is normally able to perform all instrumental activities of daily living.  MARITAL STATUS:    Patient currently lives with family at home          Family History:   I have reviewed this patient's family history  Family History   Problem Relation Age of Onset     DIABETES Maternal Grandmother              Immunizations:   Influenza vaccination status: Up-to-date.  Pneumococcal vaccination status: Vaccinated once in  1996  Immunization History   Administered Date(s) Administered     Influenza (H1N1) 01/14/2010     Influenza (High Dose) 3 valent vaccine 10/22/2010, 10/03/2011, 10/17/2012, 10/25/2013, 12/11/2014, 02/23/2016, 09/19/2016     Influenza (IIV3) 10/17/1996, 12/09/1997, 11/16/1998, 11/01/1999, 11/22/2000, 10/25/2001, 12/11/2002, 10/10/2003, 11/09/2004, 10/31/2005, 12/05/2008, 09/25/2009     Pneumococcal 23 valent 10/17/1996     Zoster vaccine, live 09/19/2016             Allergies:   All allergies reviewed and addressed  Allergies   Allergen Reactions     No Known Allergies              Medications:     No outpatient prescriptions have been marked as taking for the 2/21/17 encounter (Hospital Encounter).             Review of Systems:   CONSTITUTIONAL:  Positive for fatigue, negative for  fevers and chills  EYES:  negative for  eye discharge and visual disturbance  HEENT:  negative for  ear drainage, earaches, nasal congestion and sore throat  RESPIRATORY:  positive for  dry cough for the last week,  negative for  cough with sputum, dyspnea and pleuritic pain  CARDIOVASCULAR:  negative for  chest pain, palpitations, syncope  GASTROINTESTINAL:  Positive for one episode of diarrhea this morning, negative for nausea, vomiting, abdominal pain and hemtochezia  GENITOURINARY:  positive for hesitancy this morning when he was unable to stand,  negative for dysuria and hematuria  INTEGUMENT/BREAST:  negative for rash  HEMATOLOGIC/LYMPHATIC:  negative for bleeding  ALLERGIC/IMMUNOLOGIC:  negative for drug reactions  MUSCULOSKELETAL:  negative for  myalgias, arthralgias and joint swelling  NEUROLOGICAL:  positive for lightheadedness today, ongoing memory problems that have been unchanged in the last 2 days, and new coordination problems today where he had difficulty buttoning his shirt which he normally can do without difficulty,  negative for headaches, speech problems, numbness and tingling  BEHAVIOR/PSYCH:  negative for  hallucinations            Physical Exam:     Wt Readings from Last 1 Encounters:   02/21/17 100.5 kg (221 lb 9 oz)     Wt Readings from Last 3 Encounters:   02/21/17 100.5 kg (221 lb 9 oz)   09/19/16 101.7 kg (224 lb 3.2 oz)   09/07/16 103.8 kg (228 lb 12.8 oz)     Current weight is decreased compared with previously.    Vitals were reviewed  Patient Vitals for the past 24 hrs:   BP Temp Temp src Pulse Heart Rate Resp SpO2 Height Weight   02/21/17 1358 96/46 97.7  F (36.5  C) Oral - 58 16 95 % 1.829 m (6') 100.5 kg (221 lb 9 oz)   02/21/17 1303 - 97.9  F (36.6  C) Oral - - - - - -   02/21/17 1300 112/53 - - - 70 16 96 % - -   02/21/17 1245 114/55 - - - 70 12 94 % - -   02/21/17 1230 115/53 - - - 71 20 96 % - -   02/21/17 1215 119/52 - - - 72 (!) 7 97 % - -   02/21/17 1200 114/53 - - - 71 20 95 % - -   02/21/17 1145 109/56 - - - 72 20 94 % - -   02/21/17 1130 115/53 - - - 72 21 94 % - -   02/21/17 1115 119/58 - - - 76 21 91 % - -   02/21/17 1100 118/56 - - - 72 20 92 % - -   02/21/17 1045 126/55 - - - 71 21 94 % - -   02/21/17 1030 121/57 - - - 70 22 94 % - -   02/21/17 1015 112/55 - - - - 17 96 % - -   02/21/17 1000 109/50 - - - 72 22 95 % - -   02/21/17 0945 114/52 - - - 74 14 94 % - -   02/21/17 0930 111/53 - - - 79 21 - - -   02/21/17 0915 116/51 - - - 82 8 92 % - -   02/21/17 0900 (!) 84/55 - - - - - 92 % - -   02/21/17 0859 (!) 79/62 100.8  F (38.2  C) Oral 98 - 16 93 % - 102.1 kg (225 lb)     Constitutional:    alert, no acute distress while resting in bed, not particularly acutely ill appearing at this time      Eyes:    symmetric pupils, anicteric sclerae, clear conjunctivae, intact extraocular movements      ENT:    clear ear canals, audibly congested nares without active drainage, no oral lesions, moist mucous membranes      Neck:    trachea midline, symmetric, nontender, no stridor      Hematologic / Lymphatic:    no cervical or supraclavicular lymphadenopathy      Lungs:    normal respiratory effort,  good air movement, few scattered high-pitched expiratory wheezes, no crackles or rhonchi      Cardiovascular:    regular rate and rhythm, no gallop or murmur, good radial pulse, capillary refill 2-3 seconds in the fingertips      Chest / Breast:    no gross anomalies, symmetric excursion          GI:    normal bowel sounds, no distention, soft abdomen, no abdominal tenderness      Genitourinary:    Ordonez catheter present draining blood-tinged yellow urine      Musculoskeletal:    no acutely inflamed joints, no significant peripheral edema, no cords or tenderness in the lower extremities, hands and feet are cool to touch      Neurologic:    alert and maintains wakefulness, sometimes is forgetful even in mid sentence although with prompting resumes his previous train of thought, no overt confusion, no cranial nerve deficits apparent, normal speech, symmetric tone in the limbs, no involuntary movements, 4-5 out of 5 strength in the upper extremities, 3-4 out of 5 strength in the lower extremities proximally with left lower extremity slightly weaker during leg extension at the hip as compared with the right but good distal strength in the lower extremities bilaterally, toes downgoing on Babinski's test, brisk but not excessive deep tendon reflexes at the knees bilaterally, no clonus, unable to elicit DTRs at the biceps bilaterally      Skin:    normal color and no rashes             Data:     Results for orders placed or performed during the hospital encounter of 02/21/17 (from the past 24 hour(s))   CBC with platelets differential   Result Value Ref Range    WBC 8.7 4.0 - 11.0 10e9/L    RBC Count 4.71 4.4 - 5.9 10e12/L    Hemoglobin 13.1 (L) 13.3 - 17.7 g/dL    Hematocrit 38.8 (L) 40.0 - 53.0 %    MCV 82 78 - 100 fl    MCH 27.8 26.5 - 33.0 pg    MCHC 33.8 31.5 - 36.5 g/dL    RDW 13.6 10.0 - 15.0 %    Platelet Count 153 150 - 450 10e9/L    Diff Method Automated Method     % Neutrophils 71.4 %    % Lymphocytes 6.3 %    %  Monocytes 22.0 %    % Eosinophils 0.0 %    % Basophils 0.1 %    % Immature Granulocytes 0.2 %    Absolute Neutrophil 6.2 1.6 - 8.3 10e9/L    Absolute Lymphocytes 0.6 (L) 0.8 - 5.3 10e9/L    Absolute Monocytes 1.9 (H) 0.0 - 1.3 10e9/L    Absolute Eosinophils 0.0 0.0 - 0.7 10e9/L    Absolute Basophils 0.0 0.0 - 0.2 10e9/L    Abs Immature Granulocytes 0.0 0 - 0.4 10e9/L   Comprehensive metabolic panel   Result Value Ref Range    Sodium 140 133 - 144 mmol/L    Potassium 3.9 3.4 - 5.3 mmol/L    Chloride 107 94 - 109 mmol/L    Carbon Dioxide 23 20 - 32 mmol/L    Anion Gap 10 3 - 14 mmol/L    Glucose 122 (H) 70 - 99 mg/dL    Urea Nitrogen 22 7 - 30 mg/dL    Creatinine 1.37 (H) 0.66 - 1.25 mg/dL    GFR Estimate 49 (L) >60 mL/min/1.7m2    GFR Estimate If Black 59 (L) >60 mL/min/1.7m2    Calcium 8.1 (L) 8.5 - 10.1 mg/dL    Bilirubin Total 0.6 0.2 - 1.3 mg/dL    Albumin 2.9 (L) 3.4 - 5.0 g/dL    Protein Total 6.1 (L) 6.8 - 8.8 g/dL    Alkaline Phosphatase 99 40 - 150 U/L    ALT 17 0 - 70 U/L    AST 23 0 - 45 U/L   Lactic acid whole blood   Result Value Ref Range    Lactic Acid 1.6 0.7 - 2.1 mmol/L   Blood gas venous   Result Value Ref Range    Ph Venous 7.38 7.32 - 7.43 pH    PCO2 Venous 35 (L) 40 - 50 mm Hg    PO2 Venous 31 25 - 47 mm Hg    Bicarbonate Venous 21 21 - 28 mmol/L    Base Deficit Venous 3.7 mmol/L    FIO2 28%    XR Chest Port 1 View    Narrative    XR CHEST PORT 1 VW 2/21/2017 10:06 AM    COMPARISON: 8/1/2016    HISTORY: Fever      Impression    IMPRESSION: Mild right basilar airspace opacity, concerning for  infection or aspiration. No pleural effusion or pneumothorax.    SANJUANA SPENCER with Microscopic   Result Value Ref Range    Color Urine Yellow     Appearance Urine Clear     Glucose Urine Negative NEG mg/dL    Bilirubin Urine Negative NEG    Ketones Urine Negative NEG mg/dL    Specific Gravity Urine >1.030 1.003 - 1.035    pH Urine 5.5 5.0 - 7.0 pH    Protein Albumin Urine Trace (A) NEG mg/dL     Urobilinogen Urine 0.2 0.2 - 1.0 EU/dL    Nitrite Urine Negative NEG    Blood Urine Trace (A) NEG    Leukocyte Esterase Urine Negative NEG    Source Midstream Urine     WBC Urine O - 2 0 - 2 /HPF    RBC Urine O - 2 0 - 2 /HPF    Bacteria Urine Few (A) NEG /HPF    Amorphous Crystals Few (A) NEG /HPF    Mucous Urine Present (A) NEG /LPF   Influenza A/B antigen   Result Value Ref Range    Influenza A/B Agn Specimen Nasopharyngeal     Influenza A Positive (A) NEG    Influenza B  NEG     Negative   Test results must be correlated with clinical data. If necessary, results   should be confirmed by a molecular assay or viral culture.        Blood and urine cultures are pending      Attestation:  I have reviewed today's vital signs, notes, medications, and test results.     Hernandez Beasley MD

## 2017-02-21 NOTE — ED NOTES
Prior to leaving the ED, patient's spouse was told that patient would be staying as an admission. This RN left a voice message for his wife to inform her that per Dr Beasley, he will be an Observation patient.

## 2017-02-21 NOTE — IP AVS SNAPSHOT
[address] [facility]    [address] [place] [id number]    Phone:  [phone]                                       After Visit Summary   2/21/2017    Dean Mccarty    MRN: [id number]           After Visit Summary Signature Page     I have received my discharge instructions, and my questions have been answered. I have discussed any challenges I see with this plan with the nurse or doctor.    ..........................................................................................................................................  Patient/Patient Representative Signature      ..........................................................................................................................................  Patient Representative Print Name and Relationship to Patient    ..................................................               ................................................  Date                                            Time    ..........................................................................................................................................  Reviewed by Signature/Title    ...................................................              ..............................................  Date                                                            Time

## 2017-02-22 ENCOUNTER — APPOINTMENT (OUTPATIENT)
Dept: PHYSICAL THERAPY | Facility: CLINIC | Age: 82
End: 2017-02-22
Attending: PEDIATRICS
Payer: MEDICARE

## 2017-02-22 VITALS
HEIGHT: 72 IN | TEMPERATURE: 98 F | SYSTOLIC BLOOD PRESSURE: 121 MMHG | BODY MASS INDEX: 30.01 KG/M2 | DIASTOLIC BLOOD PRESSURE: 52 MMHG | RESPIRATION RATE: 18 BRPM | OXYGEN SATURATION: 97 % | HEART RATE: 70 BPM | WEIGHT: 221.56 LBS

## 2017-02-22 LAB
ANION GAP SERPL CALCULATED.3IONS-SCNC: 11 MMOL/L (ref 3–14)
BUN SERPL-MCNC: 17 MG/DL (ref 7–30)
CALCIUM SERPL-MCNC: 8 MG/DL (ref 8.5–10.1)
CHLORIDE SERPL-SCNC: 110 MMOL/L (ref 94–109)
CO2 SERPL-SCNC: 18 MMOL/L (ref 20–32)
CREAT SERPL-MCNC: 1.17 MG/DL (ref 0.66–1.25)
GFR SERPL CREATININE-BSD FRML MDRD: 59 ML/MIN/1.7M2
GLUCOSE SERPL-MCNC: 97 MG/DL (ref 70–99)
POTASSIUM SERPL-SCNC: 3.9 MMOL/L (ref 3.4–5.3)
PROCALCITONIN SERPL-MCNC: 0.26 NG/ML
SODIUM SERPL-SCNC: 139 MMOL/L (ref 133–144)

## 2017-02-22 PROCEDURE — 25000132 ZZH RX MED GY IP 250 OP 250 PS 637: Mod: GY | Performed by: FAMILY MEDICINE

## 2017-02-22 PROCEDURE — 25000125 ZZHC RX 250: Performed by: PEDIATRICS

## 2017-02-22 PROCEDURE — 96361 HYDRATE IV INFUSION ADD-ON: CPT

## 2017-02-22 PROCEDURE — 36415 COLL VENOUS BLD VENIPUNCTURE: CPT | Performed by: PEDIATRICS

## 2017-02-22 PROCEDURE — 80048 BASIC METABOLIC PNL TOTAL CA: CPT | Performed by: PEDIATRICS

## 2017-02-22 PROCEDURE — A9270 NON-COVERED ITEM OR SERVICE: HCPCS | Mod: GY | Performed by: FAMILY MEDICINE

## 2017-02-22 PROCEDURE — 25000132 ZZH RX MED GY IP 250 OP 250 PS 637: Mod: GY | Performed by: INTERNAL MEDICINE

## 2017-02-22 PROCEDURE — 99217 ZZC OBSERVATION CARE DISCHARGE: CPT | Performed by: PEDIATRICS

## 2017-02-22 PROCEDURE — A9270 NON-COVERED ITEM OR SERVICE: HCPCS | Mod: GY | Performed by: INTERNAL MEDICINE

## 2017-02-22 PROCEDURE — 97161 PT EVAL LOW COMPLEX 20 MIN: CPT | Mod: GP | Performed by: PHYSICAL THERAPIST

## 2017-02-22 PROCEDURE — 97530 THERAPEUTIC ACTIVITIES: CPT | Mod: GP | Performed by: PHYSICAL THERAPIST

## 2017-02-22 PROCEDURE — G0378 HOSPITAL OBSERVATION PER HR: HCPCS

## 2017-02-22 PROCEDURE — 40000193 ZZH STATISTIC PT WARD VISIT: Performed by: PHYSICAL THERAPIST

## 2017-02-22 RX ORDER — OSELTAMIVIR PHOSPHATE 30 MG/1
30 CAPSULE ORAL 2 TIMES DAILY
Qty: 7 CAPSULE | Refills: 0 | Status: SHIPPED | OUTPATIENT
Start: 2017-02-22 | End: 2017-02-26

## 2017-02-22 RX ORDER — ACETAMINOPHEN 325 MG/1
650 TABLET ORAL EVERY 4 HOURS PRN
Status: DISCONTINUED | OUTPATIENT
Start: 2017-02-22 | End: 2017-02-22 | Stop reason: HOSPADM

## 2017-02-22 RX ORDER — TAMSULOSIN HYDROCHLORIDE 0.4 MG/1
0.8 CAPSULE ORAL DAILY
Qty: 90 CAPSULE | Refills: 3 | COMMUNITY
Start: 2017-02-22 | End: 2018-01-01

## 2017-02-22 RX ADMIN — ACETAMINOPHEN 650 MG: 325 TABLET ORAL at 01:43

## 2017-02-22 RX ADMIN — ACETAMINOPHEN 650 MG: 325 TABLET ORAL at 12:16

## 2017-02-22 RX ADMIN — BUPROPION HYDROCHLORIDE 150 MG: 150 TABLET, FILM COATED, EXTENDED RELEASE ORAL at 08:02

## 2017-02-22 RX ADMIN — OSELTAMIVIR PHOSPHATE 30 MG: 30 CAPSULE ORAL at 08:02

## 2017-02-22 RX ADMIN — POTASSIUM CHLORIDE AND SODIUM CHLORIDE: 450; 150 INJECTION, SOLUTION INTRAVENOUS at 03:12

## 2017-02-22 NOTE — DISCHARGE SUMMARY
[facility] Observation Discharge Summary    Dean Mccarty MRN# [id number]   Age: 88 year old YOB: 1928     Date of Observation:  2/21/2017  Date of Discharge:  2/22/2017   Initial Physician:  Hernandez Beasley MD  Discharge Physician:  Hernandez Beasley MD     Home clinic: [facility]  Primary care provider: Leobardo Yoo          Admission Diagnoses:   Diarrhea of presumed infectious origin [A09]  Dehydration [E86.0]  Influenza A [J10.1]  Hypoxia [R09.02]          Discharge Diagnoses:   Principal diagnosis: Influenza A    Secondary diagnoses:    Influenza A    Weakness generalized    Hypotension, unspecified hypotension type    Abnormal chest x-ray - infiltrate right base    Abnormal gait    Possible acute kidney injury (H) - admit Cr 1.37, baseline 1.09    Hypertension goal BP (blood pressure) < 140/80    Hyperplasia of prostate    * No resolved hospital problems. *            Procedures:     Ordonez catheter placed in ER           Discharge Medications:     Outpatient Prescriptions Marked as Taking for the 2/21/17 encounter (Hospital Encounter)   Medication Sig     tamsulosin (FLOMAX) 0.4 MG capsule Take 2 capsules (0.8 mg) by mouth daily     oseltamivir (TAMIFLU) 30 MG capsule Take 1 capsule (30 mg) by mouth 2 times daily for 7 doses       Current Discharge Medication List      START taking these medications    Details   oseltamivir (TAMIFLU) 30 MG capsule Take 1 capsule (30 mg) by mouth 2 times daily for 7 doses  Qty: 7 capsule, Refills: 0    Associated Diagnoses: Influenza A         CONTINUE these medications which have CHANGED    Details   tamsulosin (FLOMAX) 0.4 MG capsule Take 2 capsules (0.8 mg) by mouth daily  Qty: 90 capsule, Refills: 3    Associated Diagnoses: 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 tract symptoms         CONTINUE these medications which have NOT CHANGED    Details   buPROPion (WELLBUTRIN SR) 150 MG 12 hr tablet TAKE  ONE TABLET BY MOUTH TWICE A DAY  Qty: 60 tablet, Refills: 2    Associated Diagnoses: Major depressive disorder, recurrent episode, mild (H)      sertraline (ZOLOFT) 100 MG tablet TAKE ONE TABLET BY MOUTH ONCE DAILY  Qty: 90 tablet, Refills: 0    Associated Diagnoses: Major depressive disorder, recurrent episode, mild (H)      oxybutynin (DITROPAN) 5 MG tablet TAKE ONE TABLET BY MOUTH TWICE A DAY  Qty: 60 tablet, Refills: 5    Associated Diagnoses: Hyperplasia of prostate      finasteride (PROSCAR) 5 MG tablet TAKE ONE TABLET BY MOUTH ONCE DAILY  Qty: 30 tablet, Refills: 5    Associated Diagnoses: Hyperplasia of prostate      fluticasone (FLONASE) 50 MCG/ACT nasal spray INHALE 1-2 SPRAYS IN EACH NOSTRIL ONCE DAILY  Qty: 16 g, Refills: 5    Associated Diagnoses: Seasonal allergic rhinitis, unspecified allergic rhinitis trigger      loratadine (CLARITIN) 10 MG tablet TAKE ONE TABLET BY MOUTH ONCE DAILY  Qty: 30 tablet, Refills: 9    Associated Diagnoses: Bilateral otitis media with spontaneous rupture of eardrum      polyethylene glycol (MIRALAX/GLYCOLAX) powder TAKE 17 GRAMS (1 CAPFUL) BY MOUTH TWO TIMES A DAY  Qty: 510 g, Refills: 5    Associated Diagnoses: Slow transit constipation      HYDROcodone-acetaminophen (NORCO) 5-325 MG per tablet Take 1 tablet by mouth every 8 hours as needed for moderate to severe pain  Qty: 42 tablet, Refills: 0    Associated Diagnoses: Compression fracture      ranitidine (ZANTAC) 300 MG tablet TAKE ONE TABLET BY MOUTH AT BEDTIME  Qty: 90 tablet, Refills: 3    Associated Diagnoses: Gastroesophageal reflux disease without esophagitis      omeprazole (PRILOSEC) 20 MG capsule TAKE ONE CAPSULE BY MOUTH ONCE DAILY  Qty: 90 capsule, Refills: 3    Associated Diagnoses: Gastroesophageal reflux disease without esophagitis      ibuprofen (ADVIL,MOTRIN) 600 MG tablet Take 1 tablet (600 mg) by mouth every 6 hours as needed for other (mild pain)  Qty: 40 tablet, Refills: 0    Associated Diagnoses:  Acute pharyngitis due to other specified organisms      meclizine (ANTIVERT) 25 MG tablet TAKE ONE TABLET BY MOUTH EVERY 6 HOURS AS NEEDED FOR DIZZINESS  Qty: 30 tablet, Refills: 9    Associated Diagnoses: Bilateral acute suppurative otitis media      nitroglycerin (NITROSTAT) 0.4 MG SL tablet Place 1 tablet (0.4 mg) under the tongue every 5 minutes as needed for chest pain If you are still having symptoms after 3 doses (15 minutes) call 911.  Qty: 25 tablet, Refills: 3    Associated Diagnoses: Chronic ischemic heart disease, unspecified      ORDER FOR DME 1 Device Respironics System One Auto CPAP @ 8 cm .       aspirin 81 MG tablet Take 1 tablet by mouth daily.      VITAMIN E 400 IU OR CAPS AS DIRECTED  Qty: QS, Refills: 0         STOP taking these medications       terazosin (HYTRIN) 5 MG capsule Comments:   Reason for Stopping:         atorvastatin (LIPITOR) 20 MG tablet Comments:   Reason for Stopping:         lisinopril (PRINIVIL,ZESTRIL) 5 MG tablet Comments:   Reason for Stopping:         lidocaine (LIDODERM) 5 % patch Comments:   Reason for Stopping:                     Consultations:   No consultations were requested during this hospital stay          Brief History of Illness:   88 year old male patient with HTN and enlarged prostate presented with 2 days history of worsening fatigue and weakness with inability to stand or ambulate.   In the ER, he was initially severely hypotensive and found to have influenza A, so he was hospitalized for observation. See ER note and history and physical for details.          Hospital Course:   Patient was observed in the hospital.  He was treated with aggressive IV fluid resuscitation with return of his blood pressure to normal.  Influenza A was treated with Tamiflu.  Chest x-ray demonstrated radiographic findings consistent with pneumonia, but he did not have leukocytosis.  Bacterial infection was not suspected clinically, and blood and urine cultures were negative on  preliminary results.  He was not treated with antibiotics.  His chronic lisinopril and terazosin medications were discontinued because of hypotension.  His CK level was slightly elevated, so chronic atorvastatin was also discontinued.  Creatinine was slightly elevated but returned to baseline by discharge.  Fever trended downward, and he improved clinically with improving strength.  He had an otherwise unremarkable neurologic course without focal deficits or clinical signs of meningoencephalitis, spinal myelitis, or ascending polyneuropathy.  A Ordonez catheter had initially been placed in the emergency room but was subsequently discontinued after which he was able to void spontaneously.  He was evaluated by physical therapy and was able to ambulate independently using a walker.  He maintained adequate oxygen saturations without respiratory distress.    I evaluated this patient on the day of discharge.  He did not appear to be in any distress.  Air movement in the lungs was good with scattered rhonchi and a few expiratory wheezes.  Respiratory effort was normal.  Heart rate was regular with regular rhythm.  Radial pulse was strong and capillary refill was normal.            Discharge Instructions and Follow-Up:   Discharge diet: Regular   Discharge activity: Activity as tolerated   Discharge follow-up: Follow up with primary care provider in 1-2 weeks      Pending test results:   Unresulted Labs Ordered in the Past 30 Days of this Admission     Date and Time Order Name Status Description    2/21/2017 0925 Procalcitonin In process     2/21/2017 0915 Blood culture In process     2/21/2017 0915 Blood culture In process     2/21/2017 0915 Urine Culture Aerobic Bacterial Preliminary                Discharge Disposition:   Discharged to home      Attestation:  I have reviewed today's vital signs, notes, medications, and test results.    Hernandez Beasley MD

## 2017-02-22 NOTE — PROGRESS NOTES
02/22/17 1004   Quick Adds   Type of Visit Initial PT Evaluation       Present no   Living Environment   Lives With spouse   Living Arrangements house   Home Accessibility stairs (1 railing present);stairs to enter home   Number of Stairs to Enter Home 6   Number of Stairs Within Home 0   Stair Railings at Home outside, present on right side   Transportation Available family or friend will provide   Self-Care   Usual Activity Tolerance moderate   Current Activity Tolerance fair   Regular Exercise yes   Activity/Exercise Type walking;swimming   Exercise Amount/Frequency 30 mins   Activity/Exercise/Self-Care Comment Walks daily at home. Goes to [place] [employer] with his son 1-2x/week for pool exercises.   Functional Level Prior   Ambulation 0-->independent   Transferring 0-->independent   Toileting 0-->independent   Bathing 0-->independent   Dressing 0-->independent   Eating 0-->independent   Communication 0-->understands/communicates without difficulty   Swallowing 0-->swallows foods/liquids without difficulty   Cognition 0 - no cognition issues reported   Fall history within last six months no   Which of the above functional risks had a recent onset or change? ambulation;transferring   Prior Functional Level Comment Pt lives at home with his wife.  He has been independent in all ADLs and IADLs up until 2 days ago.     General Information   Onset of Illness/Injury or Date of Surgery - Date 02/21/17   Referring Physician Dr. Beasley   Patient/Family Goals Statement Return home safely with his wife.   Pertinent History of Current Problem (include personal factors and/or comorbidities that impact the POC) Pt reports that he was not feeling well a couple days ago.  He started to become weak in the legs and was not able to walk or bear weight well.  Pt also began to experience diarrhea when his wife decided to call the ambulance due to decline in mobility and function.   Weight-Bearing  Status - LUE full weight-bearing   Weight-Bearing Status - RUE full weight-bearing   Weight-Bearing Status - LLE full weight-bearing   Weight-Bearing Status - RLE full weight-bearing   General Observations Resting comfortably in bed with HOB elevated.   Cognitive Status Examination   Orientation orientation to person, place and time   Level of Consciousness alert   Follows Commands and Answers Questions 100% of the time   Personal Safety and Judgment intact   Memory intact   Pain Assessment   Patient Currently in Pain No   Posture    Posture Forward head position;Protracted shoulders   Range of Motion (ROM)   ROM Comment UE and LE ROM is WFL allowing pt to complete transfers.   Strength   Strength Comments 4/5 MMT of B hip flexion, knee flexion, knee extension, and hip abduction.  Pt has ability to use UEs to press up from bed for transfers.   Bed Mobility   Bed Mobility Bed mobility skill: Sit to supine;Bed mobility skill: Supine to sit;Bed mobility skill: Scooting/Bridging   Bed Mobility Skill: Scooting/Bridging   Level of [place]: Scooting/Bridging stand-by assist   Physical/Nonphysical Assist: Scooting/Bridging supervision;set-up required   Assistive Device: Scooting/Bridging overhead trapeze   Bed Mobility Skill: Sit to Supine   Level of [place]: Sit/Supine stand-by assist   Physical Assist/Nonphysical Assist: Sit/Supine supervision;set-up required   Bed Mobility Skill: Supine to Sit   Level of [place]: Supine/Sit stand-by assist   Physical Assist/Nonphysical Assist: Supine/Sit supervision;set-up required   Transfer Skills   Transfer Transfer Skill: Sit to Stand;Transfer Safety Analysis Sit/Stand;Transfer Skill:  Stand to Sit;Transfer Safety Analysis Stand/Sit;Transfer Skill:  Toilet Transfer   Transfer Comments Pt had slight loss of balance with initial standing and with toilet transfer. PT was there and pt did not fall.   Transfer Skill:  Sit to Stand   Level of [place]: Sit/Stand contact  guard   Physical Assist/Nonphysical Assist: Sit/Stand supervision;set-up required;verbal cues   Weightbearing Restrictions: Sit/Stand full weight-bearing   Assistive Device for Transfer: Sit/Stand rolling walker   Transfer Safety Analysis Sit to Stand   Transfer Safety Concerns Noted: Sit/Stand losing balance backward;decreased balance during turns   Impairments Contributing to Impaired Transfers: Sit to Stand impaired balance;decreased strength   Transfer Skill: Stand to Sit   Level of [place]: Stand/Sit contact guard   Physical Assist/Nonphysical Assist: Stand/Sit supervision;set-up required;verbal cues   Weight-Bearing Restrictions: Stand/Sit full weight-bearing   Assistive Device: Stand to Sit rolling walker   Transfer Safety Analysis Stand To Sit   Transfer Safety Concerns Noted: Stand to Sit decreased balance during turns;losing balance backward   Impairments Contributing to Impaired Transfers: Stand to Sit impaired balance;decreased strength   Transfer Skill: Toilet Transfer   Level of [place]: Toilet contact guard   Physical Assist/Nonphysical Assist: Toilet supervision;set-up required;verbal cues   Weight-Bearing Restrictions: Toilet full weight-bearing   Assistive Device rolling walker   Toilet Transfer Skill Comments Pt turns too quickly when standing by the sink while when finished washing hands.  This is when he had a slight loss of balance however PT was able to maintain pt's stability and upright position.  PT educated pt greatly on safety with transfers.   Gait   Gait Gait Skill;Gait Analysis;Stairs   Gait Comments Needs verbal cueing for proper use of walker and maintaining walker close.   Gait Skills   Level of [place]: Gait contact guard   Physical Assist/Nonphysical Assist: Gait set-up required;supervision;verbal cues   Weight-Bearing Restrictions: Gait full weight-bearing   Assistive Device for Transfer: Gait rolling walker   Gait Distance 200 feet   Gait Analysis   Gait Pattern  Used swing-to gait   Gait Deviations Noted decreased stride length   Impairments Contributing to Gait Deviations impaired balance;decreased strength   Stairs   Self Performance (Contact guard assist)   Physical/Nonphysical Assist: Stairs Set-up or supervision only   Rails 1 rail   Indicate number of stairs 10   Balance   Sitting Balance: Static good balance   Sit-to-Stand Balance fair balance   Standing Balance: Static fair balance   Identified Impairments Contributing to Balance Disturbance decreased strength   Balance Comments Pt needs UE support for standing and sit to stand balance secondary to weakness.  Pt had a loss of balance with initial standing and unable to maintain static balance with external resistance.    Balance Quick Add Sitting balance: Static;Sit to stand balance;Standing balance: static;Identified impairments contributing to balance disturbance   General Therapy Interventions   Intervention Comments Address safety education.    Clinical Impression   Criteria for Skilled Therapeutic Intervention yes, treatment indicated   PT Diagnosis Decreased strength and balance secondary to illness.   Influenced by the following impairments Weakness   Functional limitations due to impairments Walking, stairs, safety with turns   Clinical Presentation Stable/Uncomplicated   Clinical Presentation Rationale Pt is an 88 year old male who lives in a house with his wife.  There are 6 steps to enter with 1 hand railing per pt report.  Pt has no need for assistive devices at home prior to illness.  He currently demonstrates with LE weakness and poor balance making him unsafe with transfers, walking, and stairs without support from an assistive device or a person.  Pt will benefit from assistance at home from spouse and daughter along with using a walker in order to ensure safety for home.   Clinical Decision Making (Complexity) Low complexity   Predicted Duration of Therapy Intervention (days/wks) 1-2 visits at this  "time.     Anticipated Discharge Disposition Home with Assist   Risk & Benefits of therapy have been explained Yes   Patient, Family & other staff in agreement with plan of care Yes   [facility] TM \"6 Clicks\"   2016, Trustees of [facility], under license to [employer].  All rights reserved.   6 Clicks Short Forms Basic Mobility Inpatient Short Form   [facility] AM-PAC  \"6 Clicks\" V.2 Basic Mobility Inpatient Short Form   1. Turning from your back to your side while in a flat bed without using bedrails? 4 - None   2. Moving from lying on your back to sitting on the side of a flat bed without using bedrails? 4 - None   3. Moving to and from a bed to a chair (including a wheelchair)? 3 - A Little   4. Standing up from a chair using your arms (e.g., wheelchair, or bedside chair)? 3 - A Little   5. To walk in hospital room? 3 - A Little   6. Climbing 3-5 steps with a railing? 3 - A Little   Basic Mobility Raw Score (Score out of 24.Lower scores equate to lower levels of function) 20   Total Evaluation Time   Total Evaluation Time (Minutes) 30     Thank you for your referral.    Evelina Soni, PT, DPT  [facility]  [phone]    "

## 2017-02-22 NOTE — PROGRESS NOTES
S-(situation): Patient discharged to home via wheelchair with daughter and wife.    B-(background): Observation goals met.    A-(assessment): Patient denies pain, VSS, did have fever at noon of 101.1, gave tylenol fever came down to 98.0. Patient discharged on tamiflu.    R-(recommendations): Discharge instructions reviewed with patient, spouse and daughter. Listed belongings gathered and returned to patient.  Patient Education resolved: Yes  New medications-Pt. Has been educated about reason of use and side effects Yes  Home and hospital acquired medications returned to patient NA  Medication Bin checked and emptied on discharge Yes

## 2017-02-22 NOTE — PLAN OF CARE
Problem: Goal Outcome Summary  Goal: Goal Outcome Summary  Outcome: Therapy, progress toward functional goals as expected  Pt is A&O.  VSS.  Had a slight temp of 100.1.  States he feels achy.  Notified for an order for tylenol.  LS have cx.  Ordonez catheter removed this shift at 0030.  Has not voided yet.  Pt states he usually has frequent voids during the night.  Will cont to monitor the above.

## 2017-02-22 NOTE — PLAN OF CARE
Problem: Goal Outcome Summary  Goal: Goal Outcome Summary  Patient is a 88 year old year old male. Prior to admission, patient was living with his wife in a house with 6 stairs to enter, using no assistive device for mobility, and requiring no assistance for ADLs. Pt reports his wife is able to help around the home and he was helping with cooking, cleaning, and driving.       Currently, patient is requiring SBA assistance for functional mobility and CGA for ambulation using front wheeled walker.      Barriers to return to previous living situation include none at this time.  PT educated pt greatly on importance of safety with transfers and gait.  Pt verbalized and demonstrated an understanding, however may need continued re-education.       Patient equipment needs at discharge include none at this time, pt reports having a front wheeled walker at home.       Recommend discharge to home with assistance of wife/daughter (who lives next door) and walker is to be used for transfers and walking within the home with daughter providing transportation.     PT will keep chart and order open at this time to ensure pt is discharging today.  PT will not discharge pt until MD places discharge orders.  If pt is to stay one more day in the hospital PT will address safety training with pt again tomorrow.     Thank you for your referral.     Evelina Soni, PT, DPT  [facility]  [phone]

## 2017-02-23 ENCOUNTER — TELEPHONE (OUTPATIENT)
Dept: INTERNAL MEDICINE | Facility: CLINIC | Age: 82
End: 2017-02-23

## 2017-02-23 LAB
BACTERIA SPEC CULT: NO GROWTH
MICRO REPORT STATUS: NORMAL
SPECIMEN SOURCE: NORMAL

## 2017-02-23 NOTE — PLAN OF CARE
Problem: Goal Outcome Summary  Goal: Goal Outcome Summary  Physical Therapy Discharge Summary     Reason for therapy discharge:    Discharged to home.     Progress towards therapy goal(s). See goals on Care Plan in [facility] electronic health record for goal details.  Evaluation only     Therapy recommendation(s):    No further therapy is recommended.      Thank you for your referral.     Evelina Soni, PT, DPT  [facility]  [phone]

## 2017-02-23 NOTE — TELEPHONE ENCOUNTER
"  ED for acute condition Discharge Protocol    \"Hi, my name is Galilea Casey, a registered nurse, and I am calling from [facility].  I am calling to follow up and see how things are going for you after your recent emergency visit.\"    Tell me how you are doing now that you are home?\"   Weak, but feeling much better.       Discharge Instructions    \"Let's review your discharge instructions.  What is/are the follow-up recommendations?  Pt. Response: Follow up with Dr. Yoo on March 6th, medications as ordered.     \"Has an appointment with your primary care provider been scheduled?\"  Yes. (confirm and remind to bring meds)    Medications    \"Tell me what changed about your medicines when you discharged?\"    Start Tamiflu.     \"What questions do you have about your medications?\"   None        Call Summary    \"What questions or concerns do you have about your recent visit and your follow-up care?\"     none    \"If you have questions or things don't continue to improve, we encourage you contact us through the main clinic number (give number).  Even if the clinic is not open, triage nurses are available 24/7 to help you.     We would like you to know that our clinic has extended hours (provide information).  We also have urgent care (provide details on closest location and hours/contact info)\"    \"Thank you for your time and take care!\"    Galilea Casey RN             "

## 2017-02-23 NOTE — TELEPHONE ENCOUNTER
Type of Visit  [facility]  Diagnosis/Reason for Visit  Influenza A  Date of Visit  02/22/17  # of ED Visits in past year  2      Please call patient for follow up.

## 2017-02-27 LAB
BACTERIA SPEC CULT: NORMAL
BACTERIA SPEC CULT: NORMAL
Lab: NORMAL
Lab: NORMAL
MICRO REPORT STATUS: NORMAL
MICRO REPORT STATUS: NORMAL
SPECIMEN SOURCE: NORMAL
SPECIMEN SOURCE: NORMAL

## 2017-03-06 ENCOUNTER — OFFICE VISIT (OUTPATIENT)
Dept: INTERNAL MEDICINE | Facility: CLINIC | Age: 82
End: 2017-03-06
Payer: COMMERCIAL

## 2017-03-06 VITALS
WEIGHT: 214 LBS | OXYGEN SATURATION: 95 % | DIASTOLIC BLOOD PRESSURE: 62 MMHG | BODY MASS INDEX: 29.02 KG/M2 | HEART RATE: 100 BPM | TEMPERATURE: 97.2 F | SYSTOLIC BLOOD PRESSURE: 102 MMHG

## 2017-03-06 DIAGNOSIS — J10.1 INFLUENZA A: Primary | ICD-10-CM

## 2017-03-06 PROCEDURE — 99213 OFFICE O/P EST LOW 20 MIN: CPT | Performed by: INTERNAL MEDICINE

## 2017-03-06 NOTE — PROGRESS NOTES
SUBJECTIVE:                                                    Dean Mccarty is a 88 year old male who presents to clinic today for the following health issues:        Hospital Follow-up Visit:    Hospital/Nursing Home/IP Rehab Facility: [facility]  Date of Admission: 2/21/2017  Date of Discharge: Influenza A  Reason(s) for Admission: better            Problems taking medications regularly:  None       Medication changes since discharge: documented       Problems adhering to non-medication therapy:  None    Summary of hospitalization:  [facility] discharge summary reviewed  Diagnostic Tests/Treatments reviewed.  Follow up needed: none  Other Healthcare Providers Involved in Patient s Care:         None  Update since discharge: improved. Family (wife) reports that his breathing has improved and his strength is returning.     Post Discharge Medication Reconciliation: discharge medications reconciled, continue medications without change.  Plan of care communicated with patient and family     Coding guidelines for this visit:  Type of Medical   Decision Making Face-to-Face Visit       within 7 Days of discharge Face-to-Face Visit        within 14 days of discharge   Moderate Complexity [id number]   High Complexity [id number]            Chief complaint:  Patient is a pleasant 88 year old male who presents to the clinic with his wife for follow up of recent hospitalization on 02/21/2017. He was diagnosed and treated for influenza A, acute epiglottis. He reports that he is feeling 80% better today, with some minimal weakness remaining. His wife says that his breathing has improved and he is ambulating without need for assistance. His blood pressures were within acceptable range today, 102/62. He has not experienced any recurrence of the hypotension or difficulty with bowel control. His memory and cognitive functions are doing well as he was able to recall specific details from our previous  appointment. His mood has also remained stable throughout this time.     Past Medical History   Diagnosis Date     Chronic ischemic heart disease, unspecified      Coronary artery dis     Depressive disorder, not elsewhere classified      Unspecified essential hypertension      Unspecified hyperplasia of prostate      Weakness generalized 2/21/2017      Past Surgical History   Procedure Laterality Date     Hc remove tonsils/adenoids,<11 y/o       T & A <12y.o.     C anesth,dx arthroscopic proc knee joint  12/12/05     Left knee, with partial meniscectomy.     Hc ugi endoscopy diag w biopsy  08/25/09      Family History   Problem Relation Age of Onset     DIABETES Maternal Grandmother       Social History   Substance Use Topics     Smoking status: Former Smoker     Packs/day: 2.00     Years: 25.00     Types: Cigarettes     Quit date: 1/1/1970     Smokeless tobacco: Never Used     Alcohol use No      Comment: rarely           Review of Systems:    ENT: Pt denies sore throat, significant nasal congestion, epistaxis, difficulty swallowing, or changes in base-line hearing.    LUNGS: Pt denies cough, excess sputum, hemoptysis, or shortness of breath.   HEART: Pt denies chest pain, arrhythmia, syncope, tachy or bradyarrhythmia or excess edema.   GI: Pt denies nausea, vomiting, diarrhea, constipation, melena or hematochezia.    NEURO: Pt denies seizures, strokes, diplopia, weakness, paraesthesias, or paralysis.    PSYCH: Pt denies significant depression, anxiety, mood imbalance. Specifically denies any suicidal ideation.    URINARY: Pt denies frequency, urgency, incontinence, or hesitancy of urine. Denies hematuria, or flank pain.    MS: Pt denies significant joint pain, swelling, or acute loss of function. Able to ambulate with little or no assistance.      Examination:  B/P: 102/62 T:97.2 P:100 R:Data Unavailable [unfilled] 214 lbs 0 oz      Constitutional: The patient appears to be in no acute distress. The patient appears  to be adequately hydrated. No acute respiratory or hemodynamic distress is noted at this time.   ENT: Face is symmetric. External auditory canal was patent without edema or erythema bilaterally. Pharynx with moist mucous membranes, no mass or lesions. Nasal mucosa normal without mass, lesion or bleed.    LUNGS: clear bilaterally, airflow is brisk, no intercostal retraction or stridor is noted. No coughing in noted during visit.    HEART: regular without rubs, clicks, gallops or murmurs. PMI is non-displaced. Upstrokes are brisk. S1, 2 are heard.    NEURO: PT is alert and appropriate. No neurologic lateralization is noted. Cranial nerves 2-12 are intact. Peripheral sensory and motor function are grossly normal.   PSYCH: The patient appears grossly appropriate. Maintain good eye contact, does not have any jittery or atypical motion. Displays appropriate affect.    MS: Minimal crepitance is noted in the extremities. No deformity is present. Muscle strength is appropriate and equal bilaterally. No acute joint erythema or swelling is present.        Decision Making:  (J10.1) Influenza A  (primary encounter diagnosis)  Comment: Discussed with the patient the increasing risk to health and complications of viral infections as we age. Commended the patient on recognizing his worsening symptoms and seeking medical care. He is visibly doing better, able to talk, breathe and ambulate without difficulty.     Plan: Continue medications as prescribed.         Follow up:    I have asked the patient to schedule a follow up appointment with me as needed, or sooner if conditions change, worsen or do not continue to improve as expected.     I have carefully explained the diagnosis and treatment options with the patient. The patient has displayed an understanding of the above, and all subsequent questions were answered.     DO MUKESH Resendiz

## 2017-03-06 NOTE — NURSING NOTE
Chief Complaint   Patient presents with     Hospital F/U       Initial /62 (BP Location: Left arm, Patient Position: Chair, Cuff Size: Adult Regular)  Pulse 100  Temp 97.2  F (36.2  C) (Temporal)  Wt 214 lb (97.1 kg)  SpO2 95%  BMI 29.02 kg/m2 Estimated body mass index is 29.02 kg/(m^2) as calculated from the following:    Height as of 2/21/17: 6' (1.829 m).    Weight as of this encounter: 214 lb (97.1 kg).  Medication Reconciliation: complete   Nighat ROSE

## 2017-03-06 NOTE — MR AVS SNAPSHOT
"              After Visit Summary   3/6/2017    Dean Mccarty    MRN: [id number]           Patient Information     Date Of Birth          1928        Visit Information        Provider Department      3/6/2017 10:20 AM Leobardo Yoo DO [facility]        Today's Diagnoses     Influenza A    -  1       Follow-ups after your visit        Who to contact     If you have questions or need follow up information about today's clinic visit or your schedule please contact [facility] directly at [phone].  Normal or non-critical lab and imaging results will be communicated to you by [employer], letter or phone within 4 business days after the clinic has received the results. If you do not hear from us within 7 days, please contact the clinic through [employer] or phone. If you have a critical or abnormal lab result, we will notify you by phone as soon as possible.  Submit refill requests through [employer] or call your pharmacy and they will forward the refill request to us. Please allow 3 business days for your refill to be completed.          Additional Information About Your Visit        MyChart Information     [employer] lets you send messages to your doctor, view your test results, renew your prescriptions, schedule appointments and more. To sign up, go to [email or web address] . Click on \"Log in\" on the left side of the screen, which will take you to the Welcome page. Then click on \"Sign up Now\" on the right side of the page.     You will be asked to enter the access code listed below, as well as some personal information. Please follow the directions to create your username and password.     Your access code is: [email or web address]  Expires: 2017  1:32 PM     Your access code will  in 90 days. If you need help or a new code, please call your [facility] or [phone].        Care EveryWhere ID     This is your Care EveryWhere ID. This could be used by other " organizations to access your [facility] medical records  [id number]        Your Vitals Were     Pulse Temperature Pulse Oximetry BMI (Body Mass Index)          100 97.2  F (36.2  C) (Temporal) 95% 29.02 kg/m2         Blood Pressure from Last 3 Encounters:   03/06/17 102/62   02/22/17 121/52   09/19/16 93/60    Weight from Last 3 Encounters:   03/06/17 214 lb (97.1 kg)   02/21/17 221 lb 9 oz (100.5 kg)   09/19/16 224 lb 3.2 oz (101.7 kg)              Today, you had the following     No orders found for display       Primary Care Provider Office Phone # Fax #    Leobardo Jensen DO [place] [phone]       [address] [place] [address]        Thank you!     Thank you for choosing [facility]  for your care. Our goal is always to provide you with excellent care. Hearing back from our patients is one way we can continue to improve our services. Please take a few minutes to complete the written survey that you may receive in the mail after your visit with us. Thank you!             Your Updated Medication List - Protect others around you: Learn how to safely use, store and throw away your medicines at www.disposemymeds.org.          This list is accurate as of: 3/6/17 11:59 PM.  Always use your most recent med list.                   Brand Name Dispense Instructions for use    aspirin 81 MG tablet      Take 1 tablet by mouth daily.       buPROPion 150 MG 12 hr tablet    WELLBUTRIN SR    60 tablet    TAKE ONE TABLET BY MOUTH TWICE A DAY       finasteride 5 MG tablet    PROSCAR    30 tablet    TAKE ONE TABLET BY MOUTH ONCE DAILY       fluticasone 50 MCG/ACT spray    FLONASE    16 g    INHALE 1-2 SPRAYS IN EACH NOSTRIL ONCE DAILY       HYDROcodone-acetaminophen 5-325 MG per tablet    NORCO    42 tablet    Take 1 tablet by mouth every 8 hours as needed for moderate to severe pain       ibuprofen 600 MG tablet    ADVIL/MOTRIN    40 tablet    Take 1 tablet (600 mg) by  mouth every 6 hours as needed for other (mild pain)       loratadine 10 MG tablet    CLARITIN    30 tablet    TAKE ONE TABLET BY MOUTH ONCE DAILY       meclizine 25 MG tablet    ANTIVERT    30 tablet    TAKE ONE TABLET BY MOUTH EVERY 6 HOURS AS NEEDED FOR DIZZINESS       nitroglycerin 0.4 MG sublingual tablet    NITROSTAT    25 tablet    Place 1 tablet (0.4 mg) under the tongue every 5 minutes as needed for chest pain If you are still having symptoms after 3 doses (15 minutes) call 911.       omeprazole 20 MG CR capsule    priLOSEC    90 capsule    TAKE ONE CAPSULE BY MOUTH ONCE DAILY       order for DME      1 Device Respironics System One Auto CPAP @ 8 cm .       oxybutynin 5 MG tablet    DITROPAN    60 tablet    TAKE ONE TABLET BY MOUTH TWICE A DAY       polyethylene glycol powder    MIRALAX/GLYCOLAX    510 g    TAKE 17 GRAMS (1 CAPFUL) BY MOUTH TWO TIMES A DAY       ranitidine 300 MG tablet    ZANTAC    90 tablet    TAKE ONE TABLET BY MOUTH AT BEDTIME       sertraline 100 MG tablet    ZOLOFT    90 tablet    TAKE ONE TABLET BY MOUTH ONCE DAILY       tamsulosin 0.4 MG capsule    FLOMAX    90 capsule    Take 2 capsules (0.8 mg) by mouth daily       vitamin E 400 UNIT capsule     QS    AS DIRECTED

## 2017-03-07 ASSESSMENT — PATIENT HEALTH QUESTIONNAIRE - PHQ9: SUM OF ALL RESPONSES TO PHQ QUESTIONS 1-9: 5

## 2017-03-29 DIAGNOSIS — K21.9 GASTROESOPHAGEAL REFLUX DISEASE WITHOUT ESOPHAGITIS: ICD-10-CM

## 2017-03-29 NOTE — TELEPHONE ENCOUNTER
Prescription approved per [facility] Refill Protocol.  Brooke Maxwell RN

## 2017-03-29 NOTE — TELEPHONE ENCOUNTER
omeprazole (PRILOSEC) 20 MG capsule      Last Written Prescription Date: 4/6/16  Last Fill Quantity: 90,  # refills: 3   Last Office Visit with [facility], [facility] or [facility] prescribing provider: 3/6/17

## 2017-03-30 DIAGNOSIS — K21.9 GASTROESOPHAGEAL REFLUX DISEASE WITHOUT ESOPHAGITIS: ICD-10-CM

## 2017-03-31 DIAGNOSIS — K21.9 GASTROESOPHAGEAL REFLUX DISEASE WITHOUT ESOPHAGITIS: ICD-10-CM

## 2017-03-31 NOTE — TELEPHONE ENCOUNTER
Omeprazole (PRILOSEC) 20 MG CR capsule      Last Written Prescription Date: 03/29/2017  Last Fill Quantity: 90,  # refills: 3   Last Office Visit with [facility], [facility] or [facility] prescribing provider: 03/06/2017  Zaida Roy CMA

## 2017-04-04 NOTE — TELEPHONE ENCOUNTER
Prescription was sent 3/29/17 for #90 with 3 refills.   Pharmacy notified via E-Prescribe refusal.     Shonna Rodriguez RN  [facility]

## 2017-04-18 DIAGNOSIS — F33.0 MAJOR DEPRESSIVE DISORDER, RECURRENT EPISODE, MILD (H): Primary | ICD-10-CM

## 2017-04-18 NOTE — TELEPHONE ENCOUNTER
sertraline (ZOLOFT) 100 MG tablet     Last Written Prescription Date: 1/6/17  Last Fill Quantity: 90 tablets, # refills: 0  Last Office Visit with [facility] primary care provider:  3/6/17        Last PHQ-9 score on record=   PHQ-9 SCORE 3/6/2017   Total Score 5

## 2017-04-19 RX ORDER — SERTRALINE HYDROCHLORIDE 100 MG/1
TABLET, FILM COATED ORAL
Qty: 90 TABLET | Refills: 1 | Status: SHIPPED | OUTPATIENT
Start: 2017-04-19 | End: 2017-08-09

## 2017-04-19 NOTE — TELEPHONE ENCOUNTER
Routing refill request to provider for review/approval because:  PHQ-9 >4    Shonna Rodriguez, RN  [facility]

## 2017-05-15 DIAGNOSIS — I25.9 CHRONIC ISCHEMIC HEART DISEASE: ICD-10-CM

## 2017-05-15 DIAGNOSIS — E78.5 HYPERLIPIDEMIA LDL GOAL <100: ICD-10-CM

## 2017-05-15 RX ORDER — ATORVASTATIN CALCIUM 20 MG/1
TABLET, FILM COATED ORAL
Qty: 90 TABLET | Refills: 0 | Status: SHIPPED | OUTPATIENT
Start: 2017-05-15 | End: 2017-08-10

## 2017-05-15 NOTE — TELEPHONE ENCOUNTER
Atorvastatin (LIPITOR 20 MG tablet      Last Written Prescription Date:  02/02/2017  Last Fill Quantity: 90,   # refills: 0  Last Office Visit with [facility], [facility] or [facility] prescribing provider: 03/06/2017  Future Office visit:       Routing refill request to provider for review/approval because:  Drug not active on patient's medication list    Zaida Roy, CMA

## 2017-07-01 DIAGNOSIS — N40.0 HYPERPLASIA OF PROSTATE: ICD-10-CM

## 2017-07-03 RX ORDER — FINASTERIDE 5 MG/1
TABLET, FILM COATED ORAL
Qty: 30 TABLET | Refills: 5 | Status: SHIPPED | OUTPATIENT
Start: 2017-07-03 | End: 2018-01-11

## 2017-07-03 RX ORDER — OXYBUTYNIN CHLORIDE 5 MG/1
TABLET ORAL
Qty: 60 TABLET | Refills: 5 | Status: SHIPPED | OUTPATIENT
Start: 2017-07-03 | End: 2018-01-11

## 2017-07-03 NOTE — TELEPHONE ENCOUNTER
Prescription approved per [facility] [place]ill [place].    Shonna Rodriguez RN  [facility]

## 2017-07-03 NOTE — TELEPHONE ENCOUNTER
Finasteride,         Last Written Prescription Date: 12/29/16  Last Fill Quantity: 30, # refills: 5    Last Office Visit with [facility], [facility] or  Health prescribing provider:  3/6/17   Future Office Visit:      BP Readings from Last 3 Encounters:   03/06/17 102/62   02/22/17 121/52   09/19/16 93/60     Oxybutynin       Last Written Prescription Date: 1/2/17  Last Fill Quantity: 60,  # refills: 5   Last Office Visit with [facility], [facility] or  Health prescribing provider: 3/6/17

## 2017-07-06 ENCOUNTER — OFFICE VISIT (OUTPATIENT)
Dept: URGENT CARE | Facility: RETAIL CLINIC | Age: 82
End: 2017-07-06
Payer: COMMERCIAL

## 2017-07-06 VITALS
SYSTOLIC BLOOD PRESSURE: 134 MMHG | TEMPERATURE: 99.1 F | HEART RATE: 54 BPM | DIASTOLIC BLOOD PRESSURE: 68 MMHG | OXYGEN SATURATION: 94 % | RESPIRATION RATE: 16 BRPM

## 2017-07-06 DIAGNOSIS — H10.31 ACUTE BACTERIAL CONJUNCTIVITIS OF RIGHT EYE: Primary | ICD-10-CM

## 2017-07-06 PROCEDURE — 99213 OFFICE O/P EST LOW 20 MIN: CPT | Performed by: NURSE PRACTITIONER

## 2017-07-06 NOTE — PROGRESS NOTES
SUBJECTIVE:  Dean Mccarty is a 89 year old male who presents complaining of mild and moderate right eye discharge, mattering, redness for 2 day(s).   Onset/timing: sudden.    Associated Signs and Symptoms: none  Treatment measures tried include: none  Contact wearer : No    Past Medical History:   Diagnosis Date     Chronic ischemic heart disease, unspecified     Coronary artery dis     Depressive disorder, not elsewhere classified      Unspecified essential hypertension      Unspecified hyperplasia of prostate      Weakness generalized 2/21/2017     Current Outpatient Prescriptions   Medication Sig Dispense Refill     neomycin-polymixin-dexamethasone (MAXITROL) ophthalmic ointment Instil 1/2 inch ribbon of ointment into affected eye every 4 hours for 7 to 10 days. 3.5 g 0     oxybutynin (DITROPAN) 5 MG tablet TAKE 1 TABLET BY MOUTH TWICE DAILY. 60 tablet 5     finasteride (PROSCAR) 5 MG tablet TAKE ONE TABLET BY MOUTH ONCE DAILY 30 tablet 5     atorvastatin (LIPITOR) 20 MG tablet TAKE ONE TABLET BY MOUTH ONCE DAILY 90 tablet 0     buPROPion (WELLBUTRIN SR) 150 MG 12 hr tablet TAKE ONE TABLET BY MOUTH TWICE A DAY 60 tablet 3     sertraline (ZOLOFT) 100 MG tablet TAKE ONE TABLET BY MOUTH ONCE DAILY 90 tablet 1     omeprazole (PRILOSEC) 20 MG CR capsule TAKE ONE CAPSULE BY MOUTH ONCE DAILY 90 capsule 3     tamsulosin (FLOMAX) 0.4 MG capsule Take 2 capsules (0.8 mg) by mouth daily 90 capsule 3     fluticasone (FLONASE) 50 MCG/ACT nasal spray INHALE 1-2 SPRAYS IN EACH NOSTRIL ONCE DAILY 16 g 5     loratadine (CLARITIN) 10 MG tablet TAKE ONE TABLET BY MOUTH ONCE DAILY 30 tablet 9     polyethylene glycol (MIRALAX/GLYCOLAX) powder TAKE 17 GRAMS (1 CAPFUL) BY MOUTH TWO TIMES A  g 5     HYDROcodone-acetaminophen (NORCO) 5-325 MG per tablet Take 1 tablet by mouth every 8 hours as needed for moderate to severe pain 42 tablet 0     ranitidine (ZANTAC) 300 MG tablet TAKE ONE TABLET BY MOUTH AT BEDTIME 90 tablet 3      ibuprofen (ADVIL,MOTRIN) 600 MG tablet Take 1 tablet (600 mg) by mouth every 6 hours as needed for other (mild pain) 40 tablet 0     meclizine (ANTIVERT) 25 MG tablet TAKE ONE TABLET BY MOUTH EVERY 6 HOURS AS NEEDED FOR DIZZINESS 30 tablet 9     nitroglycerin (NITROSTAT) 0.4 MG SL tablet Place 1 tablet (0.4 mg) under the tongue every 5 minutes as needed for chest pain If you are still having symptoms after 3 doses (15 minutes) call 911. 25 tablet 3     ORDER FOR DME 1 Device Respironics System One Auto CPAP @ 8 cm .        aspirin 81 MG tablet Take 1 tablet by mouth daily.       VITAMIN E 400 IU OR CAPS AS DIRECTED QS 0     History   Smoking Status     Former Smoker     Packs/day: 2.00     Years: 25.00     Types: Cigarettes     Quit date: 1/1/1970   Smokeless Tobacco     Never Used     ROS:  Review of systems negative except as stated above.    OBJECTIVE:  /68 (BP Location: Right arm, Patient Position: Chair, Cuff Size: Adult Regular)  Pulse 54  Temp 99.1  F (37.3  C) (Tympanic)  Resp 16  SpO2 94%  General: no acute distress  Eye exam: left eye normal lid, conjunctiva, cornea, pupil and fundus, right eye abnormal findings: conjunctivitis with erythema, discharge and matting noted.  Ears: normal canals, TMs bilaterally, normal TM mobility  Nose: NORMAL - no drainage, turbinates normal in size.  Neck: supple, non-tender, free range of motion, no adenopathy    ASSESSMENT:  Acute bacterial conjunctivitis of right eye      PLAN:  Before administering antibiotic, remove all the pus from the eye with warm water & a wipe.  The yellowish discharge should clear up in 72 hours. The red eyes may continue for several more days.  Patient is instructed on hygiene for conjunctivitis: discard her own kleenex, avoid rubbing unaffected eye(rubbing eyes can make the infection last longer), wash hands frequently, change linens which become contaminated.  Touching eyes also puts a lot of germs on hands & can spread the  infection.  After using eye drops for 24 hours, and if the pus is minimal, children can return to day care or school as they should no longer be Contagious.  If treated with an oral antibiotic the wait time to return to  is 48 hours.  Be seen by PCP or even go to the ED if outer eyelids become very red or swollen, eye becomes painful or vision becomes blurred.    F/U with PCP if infection isn't cleared up after 3 days of treatment or an earache develops.    Hernandez Escamilla MSN, APRN, Family NP-C  [facility]  July 6, 2017

## 2017-07-06 NOTE — NURSING NOTE
Chief Complaint   Patient presents with     Eye Problem     right eye pink and draining x 2 days       Initial /68 (BP Location: Right arm, Patient Position: Chair, Cuff Size: Adult Regular)  Pulse 54  Temp 99.1  F (37.3  C) (Tympanic)  Resp 16  SpO2 94% Estimated body mass index is 29.02 kg/(m^2) as calculated from the following:    Height as of 2/21/17: 6' (1.829 m).    Weight as of 3/6/17: 214 lb (97.1 kg).  Medication Reconciliation: complete     Rowan Sundet

## 2017-07-06 NOTE — MR AVS SNAPSHOT
"              After Visit Summary   2017    Dean Mccarty    MRN: [id number]           Patient Information     Date Of Birth          1928        Visit Information        Provider Department      2017 12:10 PM Hernandez Escamilla APRN [facility]        Today's Diagnoses     Acute bacterial conjunctivitis of right eye    -  1       Follow-ups after your visit        Who to contact     You can reach your care team any time of the day by calling [phone].  Notification of test results:  If you have an abnormal lab result, we will notify you by phone as soon as possible.         Additional Information About Your Visit        MyChart Information     [employer] lets you send messages to your doctor, view your test results, renew your prescriptions, schedule appointments and more. To sign up, go to [email or web address] . Click on \"Log in\" on the left side of the screen, which will take you to the Welcome page. Then click on \"Sign up Now\" on the right side of the page.     You will be asked to enter the access code listed below, as well as some personal information. Please follow the directions to create your username and password.     Your access code is: [email or web address]  Expires: 10/4/2017 12:24 PM     Your access code will  in 90 days. If you need help or a new code, please call your [place] clinic or [phone].        Care EveryWhere ID     This is your Care EveryWhere ID. This could be used by other organizations to access your [place] medical records  [id number]        Your Vitals Were     Pulse Temperature Respirations Pulse Oximetry          54 99.1  F (37.3  C) (Tympanic) 16 94%         Blood Pressure from Last 3 Encounters:   17 134/68   17 102/62   17 121/52    Weight from Last 3 Encounters:   17 214 lb (97.1 kg)   17 221 lb 9 oz (100.5 kg)   16 224 lb 3.2 oz (101.7 kg)              Today, you had the following     No " orders found for display         Today's Medication Changes          These changes are accurate as of: 7/6/17 12:24 PM.  If you have any questions, ask your nurse or doctor.               Start taking these medicines.        Dose/Directions    neomycin-polymixin-dexamethasone ophthalmic ointment   Commonly known as:  MAXITROL   Used for:  Acute bacterial conjunctivitis of right eye        Instil 1/2 inch ribbon of ointment into affected eye every 4 hours for 7 to 10 days.   Quantity:  3.5 g   Refills:  0            Where to get your medicines      These medications were sent to [address]  [address], [facility] [address]     Phone:  [phone]     neomycin-polymixin-dexamethasone ophthalmic ointment                Primary Care Provider Office Phone # Fax #    Leobardo Mikey Yoo [phone]       [address]   [facility] [address]        Equal Access to Services     ANUM ALAN : Hadii aad ku hadasho Soomaali, waaxda luqadaha, qaybta kaalmada adeegyada, waxay idiin hayevitan leopoldo segura . So [facility] [phone].    ATENCIÓN: Si habla español, tiene a machado disposición servicios gratuitos de asistencia lingüística. [facility] [phone].    We comply with applicable federal civil rights laws and [place] laws. We do not discriminate on the basis of race, color, national origin, age, disability sex, sexual orientation or gender identity.            Thank you!     Thank you for choosing [facility]  for your care. Our goal is always to provide you with excellent care. Hearing back from our patients is one way we can continue to improve our services. Please take a few minutes to complete the written survey that you may receive in the mail after your visit with us. Thank you!             Your Updated Medication List - Protect others around you: Learn how to safely use, store and throw away your medicines at  www.disposemymeds.org.          This list is accurate as of: 7/6/17 12:24 PM.  Always use your most recent med list.                   Brand Name Dispense Instructions for use Diagnosis    aspirin 81 MG tablet      Take 1 tablet by mouth daily.        atorvastatin 20 MG tablet    LIPITOR    90 tablet    TAKE ONE TABLET BY MOUTH ONCE DAILY    Hyperlipidemia LDL goal <100, Chronic ischemic heart disease       buPROPion 150 MG 12 hr tablet    WELLBUTRIN SR    60 tablet    TAKE ONE TABLET BY MOUTH TWICE A DAY    Major depressive disorder, recurrent episode, mild (H)       finasteride 5 MG tablet    PROSCAR    30 tablet    TAKE ONE TABLET BY MOUTH ONCE DAILY    Hyperplasia of prostate       fluticasone 50 MCG/ACT spray    FLONASE    16 g    INHALE 1-2 SPRAYS IN EACH NOSTRIL ONCE DAILY    Seasonal allergic rhinitis, unspecified allergic rhinitis trigger       HYDROcodone-acetaminophen 5-325 MG per tablet    NORCO    42 tablet    Take 1 tablet by mouth every 8 hours as needed for moderate to severe pain    Compression fracture       ibuprofen 600 MG tablet    ADVIL/MOTRIN    40 tablet    Take 1 tablet (600 mg) by mouth every 6 hours as needed for other (mild pain)    Acute pharyngitis due to other specified organisms       loratadine 10 MG tablet    CLARITIN    30 tablet    TAKE ONE TABLET BY MOUTH ONCE DAILY    Bilateral otitis media with spontaneous rupture of eardrum       meclizine 25 MG tablet    ANTIVERT    30 tablet    TAKE ONE TABLET BY MOUTH EVERY 6 HOURS AS NEEDED FOR DIZZINESS    Bilateral acute suppurative otitis media       neomycin-polymixin-dexamethasone ophthalmic ointment    MAXITROL    3.5 g    Instil 1/2 inch ribbon of ointment into affected eye every 4 hours for 7 to 10 days.    Acute bacterial conjunctivitis of right eye       nitroGLYcerin 0.4 MG sublingual tablet    NITROSTAT    25 tablet    Place 1 tablet (0.4 mg) under the tongue every 5 minutes as needed for chest pain If you are still having  symptoms after 3 doses (15 minutes) call 911.    Chronic ischemic heart disease, unspecified       omeprazole 20 MG CR capsule    priLOSEC    90 capsule    TAKE ONE CAPSULE BY MOUTH ONCE DAILY    Gastroesophageal reflux disease without esophagitis       order for DME      1 Device Respironics System One Auto CPAP @ 8 cm .        oxybutynin 5 MG tablet    DITROPAN    60 tablet    TAKE 1 TABLET BY MOUTH TWICE DAILY.    Hyperplasia of prostate       polyethylene glycol powder    MIRALAX/GLYCOLAX    510 g    TAKE 17 GRAMS (1 CAPFUL) BY MOUTH TWO TIMES A DAY    Slow transit constipation       ranitidine 300 MG tablet    ZANTAC    90 tablet    TAKE ONE TABLET BY MOUTH AT BEDTIME    Gastroesophageal reflux disease without esophagitis       sertraline 100 MG tablet    ZOLOFT    90 tablet    TAKE ONE TABLET BY MOUTH ONCE DAILY    Major depressive disorder, recurrent episode, mild (H)       tamsulosin 0.4 MG capsule    FLOMAX    90 capsule    Take 2 capsules (0.8 mg) by mouth daily    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 tract symptoms       vitamin E 400 UNIT capsule     QS    AS DIRECTED

## 2017-07-11 DIAGNOSIS — K21.9 GASTROESOPHAGEAL REFLUX DISEASE WITHOUT ESOPHAGITIS: ICD-10-CM

## 2017-07-11 NOTE — TELEPHONE ENCOUNTER
ranitidine (ZANTAC) 300 MG tablet      Last Written Prescription Date: 7/22/16  Last Fill Quantity: 90,  # refills: 3   Last Office Visit with [facility], [facility] or [facility] prescribing provider: 3/6/17

## 2017-07-13 NOTE — TELEPHONE ENCOUNTER
Prescription approved per [facility] Refill Protocol.    Shonna Rodriguez RN  [facility]

## 2017-08-07 DIAGNOSIS — F33.0 MAJOR DEPRESSIVE DISORDER, RECURRENT EPISODE, MILD (H): ICD-10-CM

## 2017-08-07 NOTE — TELEPHONE ENCOUNTER
sertraline (ZOLOFT) 100 MG tablet 90 tablet 1 4/19/2017  No      Sig: TAKE ONE TABLET BY MOUTH ONCE DAILY     Patient has a refill.  Sam Deluna MA    Pharmacy called and stated they do not have a current rx on file. Jasmin Quintanilla CMA ([facility])

## 2017-08-09 RX ORDER — SERTRALINE HYDROCHLORIDE 100 MG/1
100 TABLET, FILM COATED ORAL DAILY
Qty: 90 TABLET | Refills: 0 | Status: SHIPPED | OUTPATIENT
Start: 2017-08-09 | End: 2019-01-01

## 2017-08-09 RX ORDER — SERTRALINE HYDROCHLORIDE 100 MG/1
TABLET, FILM COATED ORAL
Qty: 90 TABLET | Refills: 1 | OUTPATIENT
Start: 2017-08-09

## 2017-08-09 NOTE — TELEPHONE ENCOUNTER
Pharmacy called and stated they do not have a current rx on file. Jasmin Quintanilla CMA ([facility])

## 2017-08-10 DIAGNOSIS — I25.9 CHRONIC ISCHEMIC HEART DISEASE: ICD-10-CM

## 2017-08-10 DIAGNOSIS — E78.5 HYPERLIPIDEMIA LDL GOAL <100: ICD-10-CM

## 2017-08-10 RX ORDER — ATORVASTATIN CALCIUM 20 MG/1
TABLET, FILM COATED ORAL
Qty: 90 TABLET | Refills: 0 | Status: SHIPPED | OUTPATIENT
Start: 2017-08-10 | End: 2017-11-06

## 2017-08-10 NOTE — TELEPHONE ENCOUNTER
Routing refill request to provider for review/approval because:  Labs out of range:  Fasting lipid panel.  Labs not current:  Fasting lipid panel.     Galilea Casey RN

## 2017-08-10 NOTE — TELEPHONE ENCOUNTER
atorvastatin (LIPITOR) 20 MG tablet     Last Written Prescription Date: 5/15/17  Last Fill Quantity: 90, # refills: 0  Last Office Visit with G, P or [facility] prescribing provider: 3/6/17       Lab Results   Component Value Date    CHOL 189 06/27/2016     Lab Results   Component Value Date    HDL 41 06/27/2016     Lab Results   Component Value Date     06/27/2016     Lab Results   Component Value Date    TRIG 198 06/27/2016     Lab Results   Component Value Date    CHOLHDLRATIO 2.4 06/17/2015

## 2017-08-11 ENCOUNTER — OFFICE VISIT (OUTPATIENT)
Dept: URGENT CARE | Facility: RETAIL CLINIC | Age: 82
End: 2017-08-11
Payer: COMMERCIAL

## 2017-08-11 VITALS
SYSTOLIC BLOOD PRESSURE: 138 MMHG | HEART RATE: 68 BPM | DIASTOLIC BLOOD PRESSURE: 74 MMHG | WEIGHT: 214 LBS | OXYGEN SATURATION: 95 % | RESPIRATION RATE: 16 BRPM | TEMPERATURE: 97.4 F | BODY MASS INDEX: 29.02 KG/M2

## 2017-08-11 DIAGNOSIS — H01.00A BLEPHARITIS OF BOTH UPPER AND LOWER EYELID OF RIGHT EYE, UNSPECIFIED TYPE: Primary | ICD-10-CM

## 2017-08-11 DIAGNOSIS — H57.89 IRRITATION OF RIGHT EYE: ICD-10-CM

## 2017-08-11 PROCEDURE — 99213 OFFICE O/P EST LOW 20 MIN: CPT | Performed by: PHYSICIAN ASSISTANT

## 2017-08-11 RX ORDER — ERYTHROMYCIN 5 MG/G
0.25 OINTMENT OPHTHALMIC 4 TIMES DAILY
Qty: 1 TUBE | Refills: 0 | Status: SHIPPED | OUTPATIENT
Start: 2017-08-11 | End: 2019-01-01

## 2017-08-11 NOTE — MR AVS SNAPSHOT
After Visit Summary   8/11/2017    Dean Mccarty    MRN: [id number]           Patient Information     Date Of Birth          7/6/1928        Visit Information        Provider Department      8/11/2017 5:20 PM Mckenna Andrews PA-C [facility]        Today's Diagnoses     Irritation of right eye    -  1    Blepharitis of both upper and lower eyelid of right eye, unspecified type          Care Instructions      Blepharitis    Blepharitis is an inflammation of the eyelid. It results in swelling of the eyelids, and it is usually caused by a bacterial infection or a skin condition. Blepharitis is a common eye condition. There are two types. Anterior blepharitis occurs where the eyelashes are attached (outside front edge of the eye). Posterior blepharitis affects the inner edge of the eyelid that touches the eyeball.  In addition to swollen eyelids, symptoms of blepharitis can include thick, yellow, dandruff-like scales that stick to the eyelid. There may be oily patches on the eyelid. The eyelashes may be crusted (with dandruff-like scales) when you wake up from sleeping. The irritated area may itch. The eyelids may be red. The eyes can be red and burn or sting. The eyes may tear a lot, or be dry. You can become sensitive to light or have blurred vision. Symptoms of blepharitis can cause irritability.  Blepharitis is a chronic condition and difficult to cure. Even with successful treatment, recurrences are common. Good hygiene and home treatments (in the Home care section below) can improve your condition.  Causes  Causes of blepharitis may include:    Problems with the oil glands in the eyelid (meibomian glands)    Dandruff of the scalp and eyebrows (seborrheic dermatitis)    Acne rosacea (a skin condition that causes redness of the face, and other symptoms)    Eyelash mites (tiny organisms in the eyelash follicles)    Allergic reactions to cosmetics or medicines  Home  care  Medicine: The healthcare provider may prescribe an antibiotic eye drops or ointment, artificial tears, and/or steroid eye drops. Follow all instructions for using these medicines. Use all medicines as directed. If you have pain, take medicine as advised by the healthcare provider.    Wash your hands carefully with soap and warm water before and after caring for your eyes.    Apply a warm compress or a warm, moist washcloth to the eyelids for 1 minute, 2 to 3 times a day, to loosen the crust. Then, wipe away scales or crust from the eyelids.    After applying the warm compress, gently scrub the base of the eyelashes for almost 15 seconds per eyelid. To do this, close your eyes and use a moist eyelid cleansing wipe, clean washcloth, or cotton swab. Ask your healthcare provider about products (such as nonirritating baby shampoo) to use to help clean the eyelids.    You may be instructed to gently massage your eyelids to help unblock the eyelid glands. Follow all instructions given by the healthcare provider.    Unless told otherwise, on a regular basis, with eyes closed, clean your eyelids as directed by the healthcare provider. Blepharitis can be an ongoing problem.    Do not wear eye makeup until the inflammation goes away, or as directed by your healthcare provider.    Unless told otherwise, stop using contact lenses until you complete treatment for the condition.    Wash your hands regularly to help prevent dirt and bacteria from coming in contact with your eyelid.  Follow-up care  Follow up with your healthcare provider, or as advised. Your healthcare provider may refer you to an eye specialist (an optometrist or ophthalmologist) for further evaluation and treatment.  When to seek medical advice  Call your healthcare provider right away if any of these occur:    Increase in redness of the white part of the eye    Increase in swelling, redness, irritation, or pain of the eyelids    Eye pain    Change in  vision (trouble seeing or blurring)    Drainage (pus, blood) from the eyelid    Fever of 100.4 F (38 C) or higher, or as directed by your healthcare provider  Date Last Reviewed: 10/9/2015    9822-3860 [employer]. [address] [place] [address]. All rights reserved. This information is not intended as a substitute for professional medical care. Always follow your healthcare professional's instructions.        Conjunctivitis Caused by Infection     Wash hands often to help prevent spreading infection.     Infections are caused by viruses or germs (bacteria). Treatment includes keeping your eyes and hands clean. Your healthcare provider may prescribe eye drops, and tell you to stay home from work or school if you re contagious. Untreated infections can be serious. It's important to see your provider for a diagnosis.  Viral infections  A cold, flu, or other virus can spread to your eyes. This causes a watery discharge. Your eyes may burn or itch and get red. Your eyelids may also be puffy and sore.  Treatment  Most viral infections go away on their own. Artificial tears and warm compresses can relieve symptoms. Your provider may also prescribe eye drops. A viral infection can be very contagious and spreads quickly. To prevent this, wash your hands often. Use a separate tissue to wipe each eye. Don t touch your eyes or share bedding or towels.   Bacterial infections  Bacterial infections often occur in one eye. There may be a watery or a thick discharge from the eye. These infections can cause serious damage to your eye if not treated promptly.  Treatment  Your provider may prescribe eye drops or ointment to kill the bacteria. Warm compresses can help keep the eyelids clean. To keep the bacteria from spreading, wash your hands often. Use a separate tissue to wipe each eye. Don t touch your eyes or share bedding or towels.  Date Last Reviewed: 6/11/2015 2000-2017 [employer]  "[address] [place] [address]. All rights reserved. This information is not intended as a substitute for professional medical care. Always follow your healthcare professional's instructions.                Follow-ups after your visit        Who to contact     You can reach your care team any time of the day by calling [phone].  Notification of test results:  If you have an abnormal lab result, we will notify you by phone as soon as possible.         Additional Information About Your Visit        Sulmaqhart Information     [employer] lets you send messages to your doctor, view your test results, renew your prescriptions, schedule appointments and more. To sign up, go to [email or web address] . Click on \"Log in\" on the left side of the screen, which will take you to the Welcome page. Then click on \"Sign up Now\" on the right side of the page.     You will be asked to enter the access code listed below, as well as some personal information. Please follow the directions to create your username and password.     Your access code is: [email or web address]  Expires: 10/4/2017 12:24 PM     Your access code will  in 90 days. If you need help or a new code, please call your [place] clinic or [phone].        Care EveryWhere ID     This is your Care EveryWhere ID. This could be used by other organizations to access your [place] medical records  [id number]        Your Vitals Were     Pulse Temperature Respirations Pulse Oximetry BMI (Body Mass Index)       68 97.4  F (36.3  C) (Tympanic) 16 95% 29.02 kg/m2        Blood Pressure from Last 3 Encounters:   17 138/74   17 134/68   17 102/62    Weight from Last 3 Encounters:   17 214 lb (97.1 kg)   17 214 lb (97.1 kg)   17 221 lb 9 oz (100.5 kg)              Today, you had the following     No orders found for display         Today's Medication Changes          These changes are accurate as of: 17  5:35 PM.  If you have any " questions, ask your nurse or doctor.               Start taking these medicines.        Dose/Directions    erythromycin ophthalmic ointment   Commonly known as:  ROMYCIN   Used for:  Blepharitis of both upper and lower eyelid of right eye, unspecified type        Dose:  0.25 inch   Place 0.25 inches into the right eye 4 times daily O.25 inches into eye(s) 4 times daily   Quantity:  1 Tube   Refills:  0            Where to get your medicines      These medications were sent to [address]  [address], [facility] [address]     Phone:  [phone]     erythromycin ophthalmic ointment                Primary Care Provider Office Phone # Fax #    Leobardo Jensen Fredo,  [phone]       [address] [place] [address]        Equal Access to Services     ANUM ALAN AH: Ankit mckinnon hadasho Soomaali, waaxda luqadaha, qaybta kaalmada adeegyada, mary carmen segura . So [facility] [phone].    ATENCIÓN: Si habla español, tiene a machado disposición servicios gratuitos de asistencia lingüística. Llame al [phone].    We comply with applicable federal civil rights laws and [place] laws. We do not discriminate on the basis of race, color, national origin, age, disability sex, sexual orientation or gender identity.            Thank you!     Thank you for choosing [facility]  for your care. Our goal is always to provide you with excellent care. Hearing back from our patients is one way we can continue to improve our services. Please take a few minutes to complete the written survey that you may receive in the mail after your visit with us. Thank you!             Your Updated Medication List - Protect others around you: Learn how to safely use, store and throw away your medicines at www.disposemymeds.org.          This list is accurate as of: 8/11/17  5:35 PM.  Always use your most recent med list.                   Brand Name  Dispense Instructions for use Diagnosis    aspirin 81 MG tablet      Take 1 tablet by mouth daily.        atorvastatin 20 MG tablet    LIPITOR    90 tablet    TAKE ONE TABLET BY MOUTH ONCE DAILY    Hyperlipidemia LDL goal <100, Chronic ischemic heart disease       buPROPion 150 MG 12 hr tablet    WELLBUTRIN SR    60 tablet    TAKE ONE TABLET BY MOUTH TWICE A DAY    Major depressive disorder, recurrent episode, mild (H)       erythromycin ophthalmic ointment    ROMYCIN    1 Tube    Place 0.25 inches into the right eye 4 times daily O.25 inches into eye(s) 4 times daily    Blepharitis of both upper and lower eyelid of right eye, unspecified type       finasteride 5 MG tablet    PROSCAR    30 tablet    TAKE ONE TABLET BY MOUTH ONCE DAILY    Hyperplasia of prostate       fluticasone 50 MCG/ACT spray    FLONASE    16 g    INHALE 1-2 SPRAYS IN EACH NOSTRIL ONCE DAILY    Seasonal allergic rhinitis, unspecified allergic rhinitis trigger       HYDROcodone-acetaminophen 5-325 MG per tablet    NORCO    42 tablet    Take 1 tablet by mouth every 8 hours as needed for moderate to severe pain    Compression fracture       ibuprofen 600 MG tablet    ADVIL/MOTRIN    40 tablet    Take 1 tablet (600 mg) by mouth every 6 hours as needed for other (mild pain)    Acute pharyngitis due to other specified organisms       loratadine 10 MG tablet    CLARITIN    30 tablet    TAKE ONE TABLET BY MOUTH ONCE DAILY    Bilateral otitis media with spontaneous rupture of eardrum       meclizine 25 MG tablet    ANTIVERT    30 tablet    TAKE ONE TABLET BY MOUTH EVERY 6 HOURS AS NEEDED FOR DIZZINESS    Bilateral acute suppurative otitis media       nitroGLYcerin 0.4 MG sublingual tablet    NITROSTAT    25 tablet    Place 1 tablet (0.4 mg) under the tongue every 5 minutes as needed for chest pain If you are still having symptoms after 3 doses (15 minutes) call 911.    Chronic ischemic heart disease, unspecified       omeprazole 20 MG CR capsule     priLOSEC    90 capsule    TAKE ONE CAPSULE BY MOUTH ONCE DAILY    Gastroesophageal reflux disease without esophagitis       order for DME      1 Device Respironics System One Auto CPAP @ 8 cm .        oxybutynin 5 MG tablet    DITROPAN    60 tablet    TAKE 1 TABLET BY MOUTH TWICE DAILY.    Hyperplasia of prostate       polyethylene glycol powder    MIRALAX/GLYCOLAX    510 g    TAKE 17 GRAMS (1 CAPFUL) BY MOUTH TWO TIMES A DAY    Slow transit constipation       ranitidine 300 MG tablet    ZANTAC    90 tablet    TAKE ONE TABLET BY MOUTH AT BEDTIME    Gastroesophageal reflux disease without esophagitis       sertraline 100 MG tablet    ZOLOFT    90 tablet    Take 1 tablet (100 mg) by mouth daily    Major depressive disorder, recurrent episode, mild (H)       tamsulosin 0.4 MG capsule    FLOMAX    90 capsule    Take 2 capsules (0.8 mg) by mouth daily    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 tract symptoms       vitamin E 400 UNIT capsule     QS    AS DIRECTED

## 2017-08-11 NOTE — PATIENT INSTRUCTIONS
Blepharitis    Blepharitis is an inflammation of the eyelid. It results in swelling of the eyelids, and it is usually caused by a bacterial infection or a skin condition. Blepharitis is a common eye condition. There are two types. Anterior blepharitis occurs where the eyelashes are attached (outside front edge of the eye). Posterior blepharitis affects the inner edge of the eyelid that touches the eyeball.  In addition to swollen eyelids, symptoms of blepharitis can include thick, yellow, dandruff-like scales that stick to the eyelid. There may be oily patches on the eyelid. The eyelashes may be crusted (with dandruff-like scales) when you wake up from sleeping. The irritated area may itch. The eyelids may be red. The eyes can be red and burn or sting. The eyes may tear a lot, or be dry. You can become sensitive to light or have blurred vision. Symptoms of blepharitis can cause irritability.  Blepharitis is a chronic condition and difficult to cure. Even with successful treatment, recurrences are common. Good hygiene and home treatments (in the Home care section below) can improve your condition.  Causes  Causes of blepharitis may include:    Problems with the oil glands in the eyelid (meibomian glands)    Dandruff of the scalp and eyebrows (seborrheic dermatitis)    Acne rosacea (a skin condition that causes redness of the face, and other symptoms)    Eyelash mites (tiny organisms in the eyelash follicles)    Allergic reactions to cosmetics or medicines  Home care  Medicine: The healthcare provider may prescribe an antibiotic eye drops or ointment, artificial tears, and/or steroid eye drops. Follow all instructions for using these medicines. Use all medicines as directed. If you have pain, take medicine as advised by the healthcare provider.    Wash your hands carefully with soap and warm water before and after caring for your eyes.    Apply a warm compress or a warm, moist washcloth to the eyelids for 1 minute,  2 to 3 times a day, to loosen the crust. Then, wipe away scales or crust from the eyelids.    After applying the warm compress, gently scrub the base of the eyelashes for almost 15 seconds per eyelid. To do this, close your eyes and use a moist eyelid cleansing wipe, clean washcloth, or cotton swab. Ask your healthcare provider about products (such as nonirritating baby shampoo) to use to help clean the eyelids.    You may be instructed to gently massage your eyelids to help unblock the eyelid glands. Follow all instructions given by the healthcare provider.    Unless told otherwise, on a regular basis, with eyes closed, clean your eyelids as directed by the healthcare provider. Blepharitis can be an ongoing problem.    Do not wear eye makeup until the inflammation goes away, or as directed by your healthcare provider.    Unless told otherwise, stop using contact lenses until you complete treatment for the condition.    Wash your hands regularly to help prevent dirt and bacteria from coming in contact with your eyelid.  Follow-up care  Follow up with your healthcare provider, or as advised. Your healthcare provider may refer you to an eye specialist (an optometrist or ophthalmologist) for further evaluation and treatment.  When to seek medical advice  Call your healthcare provider right away if any of these occur:    Increase in redness of the white part of the eye    Increase in swelling, redness, irritation, or pain of the eyelids    Eye pain    Change in vision (trouble seeing or blurring)    Drainage (pus, blood) from the eyelid    Fever of 100.4 F (38 C) or higher, or as directed by your healthcare provider  Date Last Reviewed: 10/9/2015    7357-3513 [employer]. [address] [place] [address], [place] [address]. All rights reserved. This information is not intended as a substitute for professional medical care. Always follow your healthcare professional's instructions.        Conjunctivitis Caused by  Infection     Wash hands often to help prevent spreading infection.     Infections are caused by viruses or germs (bacteria). Treatment includes keeping your eyes and hands clean. Your healthcare provider may prescribe eye drops, and tell you to stay home from work or school if you re contagious. Untreated infections can be serious. It's important to see your provider for a diagnosis.  Viral infections  A cold, flu, or other virus can spread to your eyes. This causes a watery discharge. Your eyes may burn or itch and get red. Your eyelids may also be puffy and sore.  Treatment  Most viral infections go away on their own. Artificial tears and warm compresses can relieve symptoms. Your provider may also prescribe eye drops. A viral infection can be very contagious and spreads quickly. To prevent this, wash your hands often. Use a separate tissue to wipe each eye. Don t touch your eyes or share bedding or towels.   Bacterial infections  Bacterial infections often occur in one eye. There may be a watery or a thick discharge from the eye. These infections can cause serious damage to your eye if not treated promptly.  Treatment  Your provider may prescribe eye drops or ointment to kill the bacteria. Warm compresses can help keep the eyelids clean. To keep the bacteria from spreading, wash your hands often. Use a separate tissue to wipe each eye. Don t touch your eyes or share bedding or towels.  Date Last Reviewed: 6/11/2015 2000-2017 [employer]. [address] [place] [address], [place] [address]. All rights reserved. This information is not intended as a substitute for professional medical care. Always follow your healthcare professional's instructions.

## 2017-08-11 NOTE — PROGRESS NOTES
S: Pt present to [facility] concerned of eye problem.  Possible pink eye rt  Eye irritation  x weeks - feels like eyelids are 'grainy' and rt eye a little red.   Denies dry eyes.  Some mattery discharge rt eye  No  history of trauma  No  FB sensation   No  Light sensitivity   No  vision changes  Wears glasses  No  contacts    See 7/6/2017 OV - rt conjunctivitis - Rx Maxitrol opth oint - used 2 days and lost it. Symptoms did not get better and now 'tired of it' .     ROS:  ENT - denies ear pain, throat pain. no nasal congestion.  CP - no cough,SOB or chest pain.   GI/ - Appetite - geeod. No nausea, vomiting or diarrhea.   MSK - no joint pain or swelling.   Skin-  no rashes. no lesions.     Past Medical History:   Diagnosis Date     Chronic ischemic heart disease, unspecified     Coronary artery dis     Depressive disorder, not elsewhere classified      Unspecified essential hypertension      Unspecified hyperplasia of prostate      Weakness generalized 2/21/2017     Past Surgical History:   Procedure Laterality Date     C ANESTH,DX ARTHROSCOPIC PROC KNEE JOINT  12/12/05    Left knee, with partial meniscectomy.     HC REMOVE TONSILS/ADENOIDS,<13 Y/O      T & A <12y.o.     HC UGI ENDOSCOPY DIAG W BIOPSY  08/25/09     Patient Active Problem List   Diagnosis     Chronic ischemic heart disease     Mild major depression (H)     HYPERLIPIDEMIA LDL GOAL <100     Hypertension goal BP (blood pressure) < 140/80     Advanced directives, counseling/discussion     Adjustment disorder with anxiety     Hyperplasia of prostate     Esophageal reflux     Major depressive disorder, recurrent episode, mild (H)     Acute epiglottitis without airway obstruction - possible     Seasonal allergic rhinitis, unspecified allergic rhinitis trigger     Influenza A     Hypotension, unspecified hypotension type     Abnormal chest x-ray - infiltrate right base     Weakness generalized     Abnormal gait     Possible acute kidney injury  (H) - admit Cr 1.37, baseline 1.09     Current Outpatient Prescriptions   Medication     atorvastatin (LIPITOR) 20 MG tablet     sertraline (ZOLOFT) 100 MG tablet     ranitidine (ZANTAC) 300 MG tablet     oxybutynin (DITROPAN) 5 MG tablet     finasteride (PROSCAR) 5 MG tablet     buPROPion (WELLBUTRIN SR) 150 MG 12 hr tablet     omeprazole (PRILOSEC) 20 MG CR capsule     tamsulosin (FLOMAX) 0.4 MG capsule     fluticasone (FLONASE) 50 MCG/ACT nasal spray     loratadine (CLARITIN) 10 MG tablet     polyethylene glycol (MIRALAX/GLYCOLAX) powder     HYDROcodone-acetaminophen (NORCO) 5-325 MG per tablet     ibuprofen (ADVIL,MOTRIN) 600 MG tablet     meclizine (ANTIVERT) 25 MG tablet     nitroglycerin (NITROSTAT) 0.4 MG SL tablet     ORDER FOR DME     aspirin 81 MG tablet     VITAMIN E 400 IU OR CAPS     No current facility-administered medications for this visit.          O: /74 (BP Location: Right arm, Patient Position: Chair, Cuff Size: Adult Regular)  Pulse 68  Temp 97.4  F (36.3  C) (Tympanic)  Resp 16  Wt 214 lb (97.1 kg)  SpO2 95%  BMI 29.02 kg/m2    Eyes:   Fundi benign fiorella  PERRLA, EOM bilaterally normal.  Conjunctival injection noted rt.  Diffuse scleral injection rt but no circumcorneal injection.  No FB seen   Mattery discharge noted rt  Some swelling and redness of Rt upper and lower eyelids (> upper)eyelids.  Nontender to palpate around orbits.     External ears  and canals clear bilaterally. TM's  normal bilaterally.   Nose normal without lesions or discharge.   Oropharynx  normal.  Neck supple without palpable adenopathy.       A;    Irritation of right eye  Blepharitis of both upper and lower eyelid of right eye, unspecified type      P: Prescriptions as below. Discussed indications, dosing, side affects and adverse reactions of medications with  Patient -Ilotycin ophth.  Contagiousness and hygiene discussed.  Warm packs.  Rest eye.    Follow up with your primary care provider or eye clinic if  worsening symptoms, not improving in a few days or if symptoms do not resolve.    AVS given and discussed:  Patient Instructions       Blepharitis    Blepharitis is an inflammation of the eyelid. It results in swelling of the eyelids, and it is usually caused by a bacterial infection or a skin condition. Blepharitis is a common eye condition. There are two types. Anterior blepharitis occurs where the eyelashes are attached (outside front edge of the eye). Posterior blepharitis affects the inner edge of the eyelid that touches the eyeball.  In addition to swollen eyelids, symptoms of blepharitis can include thick, yellow, dandruff-like scales that stick to the eyelid. There may be oily patches on the eyelid. The eyelashes may be crusted (with dandruff-like scales) when you wake up from sleeping. The irritated area may itch. The eyelids may be red. The eyes can be red and burn or sting. The eyes may tear a lot, or be dry. You can become sensitive to light or have blurred vision. Symptoms of blepharitis can cause irritability.  Blepharitis is a chronic condition and difficult to cure. Even with successful treatment, recurrences are common. Good hygiene and home treatments (in the Home care section below) can improve your condition.  Causes  Causes of blepharitis may include:    Problems with the oil glands in the eyelid (meibomian glands)    Dandruff of the scalp and eyebrows (seborrheic dermatitis)    Acne rosacea (a skin condition that causes redness of the face, and other symptoms)    Eyelash mites (tiny organisms in the eyelash follicles)    Allergic reactions to cosmetics or medicines  Home care  Medicine: The healthcare provider may prescribe an antibiotic eye drops or ointment, artificial tears, and/or steroid eye drops. Follow all instructions for using these medicines. Use all medicines as directed. If you have pain, take medicine as advised by the healthcare provider.    Wash your hands carefully with soap  and warm water before and after caring for your eyes.    Apply a warm compress or a warm, moist washcloth to the eyelids for 1 minute, 2 to 3 times a day, to loosen the crust. Then, wipe away scales or crust from the eyelids.    After applying the warm compress, gently scrub the base of the eyelashes for almost 15 seconds per eyelid. To do this, close your eyes and use a moist eyelid cleansing wipe, clean washcloth, or cotton swab. Ask your healthcare provider about products (such as nonirritating baby shampoo) to use to help clean the eyelids.    You may be instructed to gently massage your eyelids to help unblock the eyelid glands. Follow all instructions given by the healthcare provider.    Unless told otherwise, on a regular basis, with eyes closed, clean your eyelids as directed by the healthcare provider. Blepharitis can be an ongoing problem.    Do not wear eye makeup until the inflammation goes away, or as directed by your healthcare provider.    Unless told otherwise, stop using contact lenses until you complete treatment for the condition.    Wash your hands regularly to help prevent dirt and bacteria from coming in contact with your eyelid.  Follow-up care  Follow up with your healthcare provider, or as advised. Your healthcare provider may refer you to an eye specialist (an optometrist or ophthalmologist) for further evaluation and treatment.  When to seek medical advice  Call your healthcare provider right away if any of these occur:    Increase in redness of the white part of the eye    Increase in swelling, redness, irritation, or pain of the eyelids    Eye pain    Change in vision (trouble seeing or blurring)    Drainage (pus, blood) from the eyelid    Fever of 100.4 F (38 C) or higher, or as directed by your healthcare provider  Date Last Reviewed: 10/9/2015    4939-8107 [employer]. [address] [place] [address], [place] [address]. All rights reserved. This information is not intended as a  substitute for professional medical care. Always follow your healthcare professional's instructions.        Conjunctivitis Caused by Infection     Wash hands often to help prevent spreading infection.     Infections are caused by viruses or germs (bacteria). Treatment includes keeping your eyes and hands clean. Your healthcare provider may prescribe eye drops, and tell you to stay home from work or school if you re contagious. Untreated infections can be serious. It's important to see your provider for a diagnosis.  Viral infections  A cold, flu, or other virus can spread to your eyes. This causes a watery discharge. Your eyes may burn or itch and get red. Your eyelids may also be puffy and sore.  Treatment  Most viral infections go away on their own. Artificial tears and warm compresses can relieve symptoms. Your provider may also prescribe eye drops. A viral infection can be very contagious and spreads quickly. To prevent this, wash your hands often. Use a separate tissue to wipe each eye. Don t touch your eyes or share bedding or towels.   Bacterial infections  Bacterial infections often occur in one eye. There may be a watery or a thick discharge from the eye. These infections can cause serious damage to your eye if not treated promptly.  Treatment  Your provider may prescribe eye drops or ointment to kill the bacteria. Warm compresses can help keep the eyelids clean. To keep the bacteria from spreading, wash your hands often. Use a separate tissue to wipe each eye. Don t touch your eyes or share bedding or towels.  Date Last Reviewed: 6/11/2015 2000-2017 [employer]. [address] [place] [address], [place] [address]. All rights reserved. This information is not intended as a substitute for professional medical care. Always follow your healthcare professional's instructions.          Pt is comfortable with this plan.  Electronically signed,  ASHLEY Davis

## 2017-08-11 NOTE — NURSING NOTE
Chief Complaint   Patient presents with     Eye Problem     right eye pink and draining, was seen a month ago only had two days of cream. left eye starting       Initial /74 (BP Location: Right arm, Patient Position: Chair, Cuff Size: Adult Regular)  Pulse 68  Temp 97.4  F (36.3  C) (Tympanic)  Resp 16  Wt 214 lb (97.1 kg)  SpO2 95%  BMI 29.02 kg/m2 Estimated body mass index is 29.02 kg/(m^2) as calculated from the following:    Height as of 2/21/17: 6' (1.829 m).    Weight as of this encounter: 214 lb (97.1 kg).  Medication Reconciliation: complete     Jessica Sundet

## 2017-09-17 DIAGNOSIS — K21.9 GASTROESOPHAGEAL REFLUX DISEASE WITHOUT ESOPHAGITIS: ICD-10-CM

## 2017-09-18 NOTE — TELEPHONE ENCOUNTER
ranitidine (ZANTAC) 300 MG tablet      Last Written Prescription Date: 7/13/17  Last Fill Quantity: 90,  # refills: 0   Last Office Visit with [facility], [facility] or [facility] prescribing provider: 3/6/17

## 2017-11-06 DIAGNOSIS — I25.9 CHRONIC ISCHEMIC HEART DISEASE: ICD-10-CM

## 2017-11-06 DIAGNOSIS — E78.5 HYPERLIPIDEMIA LDL GOAL <100: ICD-10-CM

## 2017-11-06 RX ORDER — ATORVASTATIN CALCIUM 20 MG/1
TABLET, FILM COATED ORAL
Qty: 90 TABLET | Refills: 0 | Status: SHIPPED | OUTPATIENT
Start: 2017-11-06 | End: 2018-01-01

## 2017-11-06 NOTE — TELEPHONE ENCOUNTER
Requested Prescriptions   Pending Prescriptions Disp Refills     atorvastatin (LIPITOR) 20 MG tablet [Pharmacy Med Name: ATORVASTATIN 20MG TABS] 90 tablet 0     Sig: TAKE ONE TABLET BY MOUTH ONCE DAILY    Statins Protocol Failed    11/6/2017  8:47 AM       Failed - LDL on file in past 12 months    Recent Labs   Lab Test  06/27/16   1138   LDL  108*            Passed - No abnormal creatine kinase in past 12 months    No lab results found.         Passed - Recent or future visit with authorizing provider    Patient had office visit in the last year or has a visit in the next 30 days with authorizing provider.  See chart review.              Passed - Patient is age 18 or older

## 2017-11-06 NOTE — TELEPHONE ENCOUNTER
Routing refill request to provider for review/approval because:  Labs out of range:  LDL  Labs not current:  LDL    Shonna Rodriguez RN  [facility]

## 2017-11-08 DIAGNOSIS — E78.5 HYPERLIPIDEMIA LDL GOAL <100: ICD-10-CM

## 2017-11-08 DIAGNOSIS — I25.9 CHRONIC ISCHEMIC HEART DISEASE: ICD-10-CM

## 2017-11-08 RX ORDER — ATORVASTATIN CALCIUM 20 MG/1
TABLET, FILM COATED ORAL
Qty: 90 TABLET | Refills: 0 | OUTPATIENT
Start: 2017-11-08

## 2017-11-08 NOTE — TELEPHONE ENCOUNTER
Prescription was sent 11/6/17 for #90 with 0 refills.  Pharmacy notified via E-Prescribe refusal.     Shonna Rodriguez RN  [facility]

## 2017-11-08 NOTE — TELEPHONE ENCOUNTER
Requested Prescriptions   Pending Prescriptions Disp Refills     atorvastatin (LIPITOR) 20 MG tablet [Pharmacy Med Name: ATORVASTATIN 20MG TABS] 90 tablet 0     Sig: TAKE ONE TABLET BY MOUTH ONCE DAILY    Statins Protocol Failed    11/8/2017  8:54 AM       Failed - LDL on file in past 12 months    Recent Labs   Lab Test  06/27/16   1138   LDL  108*            Passed - No abnormal creatine kinase in past 12 months    No lab results found.         Passed - Recent or future visit with authorizing provider    Patient had office visit in the last year or has a visit in the next 30 days with authorizing provider.  See chart review.              Passed - Patient is age 18 or older

## 2017-11-09 DIAGNOSIS — E78.5 HYPERLIPIDEMIA LDL GOAL <100: ICD-10-CM

## 2017-11-09 DIAGNOSIS — I25.9 CHRONIC ISCHEMIC HEART DISEASE: ICD-10-CM

## 2017-11-09 RX ORDER — ATORVASTATIN CALCIUM 20 MG/1
TABLET, FILM COATED ORAL
Qty: 90 TABLET | Refills: 0 | Status: SHIPPED | OUTPATIENT
Start: 2017-11-09 | End: 2018-01-01

## 2017-11-09 NOTE — TELEPHONE ENCOUNTER
Routing refill request to provider for review/approval because:  Labs out of range:  LDL  Labs not current:  LDL    Shonna Rodriguez RN  [facility]

## 2017-11-09 NOTE — TELEPHONE ENCOUNTER
Requested Prescriptions   Pending Prescriptions Disp Refills     atorvastatin (LIPITOR) 20 MG tablet [Pharmacy Med Name: ATORVASTATIN 20MG TABS] 90 tablet 0     Sig: TAKE ONE TABLET BY MOUTH ONCE DAILY    Statins Protocol Failed    11/9/2017  1:01 AM       Failed - LDL on file in past 12 months    Recent Labs   Lab Test  06/27/16   1138   LDL  108*            Passed - No abnormal creatine kinase in past 12 months    No lab results found.         Passed - Recent or future visit with authorizing provider    Patient had office visit in the last year or has a visit in the next 30 days with authorizing provider.  See chart review.              Passed - Patient is age 18 or older

## 2018-01-01 ENCOUNTER — TELEPHONE (OUTPATIENT)
Dept: FAMILY MEDICINE | Facility: OTHER | Age: 83
End: 2018-01-01

## 2018-01-01 ENCOUNTER — OFFICE VISIT (OUTPATIENT)
Dept: FAMILY MEDICINE | Facility: OTHER | Age: 83
End: 2018-01-01
Payer: COMMERCIAL

## 2018-01-01 ENCOUNTER — TELEPHONE (OUTPATIENT)
Dept: FAMILY MEDICINE | Facility: CLINIC | Age: 83
End: 2018-01-01

## 2018-01-01 ENCOUNTER — OFFICE VISIT (OUTPATIENT)
Dept: ORTHOPEDICS | Facility: CLINIC | Age: 83
End: 2018-01-01
Payer: COMMERCIAL

## 2018-01-01 ENCOUNTER — ALLIED HEALTH/NURSE VISIT (OUTPATIENT)
Dept: FAMILY MEDICINE | Facility: CLINIC | Age: 83
End: 2018-01-01
Payer: COMMERCIAL

## 2018-01-01 ENCOUNTER — OFFICE VISIT (OUTPATIENT)
Dept: CARDIOLOGY | Facility: CLINIC | Age: 83
End: 2018-01-01
Payer: COMMERCIAL

## 2018-01-01 ENCOUNTER — HOSPITAL ENCOUNTER (OUTPATIENT)
Dept: NUCLEAR MEDICINE | Facility: CLINIC | Age: 83
Setting detail: NUCLEAR MEDICINE
Discharge: HOME | End: 2018-06-28
Attending: INTERNAL MEDICINE | Admitting: INTERNAL MEDICINE
Payer: MEDICARE

## 2018-01-01 ENCOUNTER — HOSPITAL ENCOUNTER (EMERGENCY)
Facility: CLINIC | Age: 83
Discharge: HOME | End: 2018-12-11
Attending: NURSE PRACTITIONER | Admitting: NURSE PRACTITIONER
Payer: MEDICARE

## 2018-01-01 ENCOUNTER — OFFICE VISIT (OUTPATIENT)
Dept: URGENT CARE | Facility: RETAIL CLINIC | Age: 83
End: 2018-01-01
Payer: COMMERCIAL

## 2018-01-01 ENCOUNTER — RADIANT APPOINTMENT (OUTPATIENT)
Dept: GENERAL RADIOLOGY | Facility: CLINIC | Age: 83
End: 2018-01-01
Attending: ORTHOPAEDIC SURGERY
Payer: COMMERCIAL

## 2018-01-01 VITALS
HEIGHT: 71 IN | RESPIRATION RATE: 14 BRPM | HEART RATE: 100 BPM | OXYGEN SATURATION: 98 % | SYSTOLIC BLOOD PRESSURE: 138 MMHG | TEMPERATURE: 98.1 F | WEIGHT: 218 LBS | BODY MASS INDEX: 30.52 KG/M2 | DIASTOLIC BLOOD PRESSURE: 68 MMHG

## 2018-01-01 VITALS
HEIGHT: 71 IN | DIASTOLIC BLOOD PRESSURE: 72 MMHG | SYSTOLIC BLOOD PRESSURE: 110 MMHG | WEIGHT: 217.3 LBS | BODY MASS INDEX: 30.42 KG/M2 | HEART RATE: 86 BPM | OXYGEN SATURATION: 95 %

## 2018-01-01 VITALS
RESPIRATION RATE: 16 BRPM | DIASTOLIC BLOOD PRESSURE: 79 MMHG | TEMPERATURE: 97.7 F | SYSTOLIC BLOOD PRESSURE: 127 MMHG | OXYGEN SATURATION: 96 %

## 2018-01-01 VITALS
SYSTOLIC BLOOD PRESSURE: 112 MMHG | RESPIRATION RATE: 16 BRPM | TEMPERATURE: 96.9 F | DIASTOLIC BLOOD PRESSURE: 60 MMHG | WEIGHT: 218.4 LBS | OXYGEN SATURATION: 94 % | HEART RATE: 86 BPM | BODY MASS INDEX: 30.46 KG/M2

## 2018-01-01 VITALS
SYSTOLIC BLOOD PRESSURE: 120 MMHG | BODY MASS INDEX: 30.52 KG/M2 | DIASTOLIC BLOOD PRESSURE: 60 MMHG | HEIGHT: 71 IN | WEIGHT: 218 LBS

## 2018-01-01 VITALS
SYSTOLIC BLOOD PRESSURE: 173 MMHG | TEMPERATURE: 97.5 F | HEART RATE: 75 BPM | OXYGEN SATURATION: 99 % | DIASTOLIC BLOOD PRESSURE: 91 MMHG

## 2018-01-01 DIAGNOSIS — I25.10 CORONARY ARTERY DISEASE INVOLVING NATIVE CORONARY ARTERY OF NATIVE HEART WITHOUT ANGINA PECTORIS: Primary | ICD-10-CM

## 2018-01-01 DIAGNOSIS — I25.9 CHRONIC ISCHEMIC HEART DISEASE: ICD-10-CM

## 2018-01-01 DIAGNOSIS — M17.0 PRIMARY OSTEOARTHRITIS OF BOTH KNEES: Primary | ICD-10-CM

## 2018-01-01 DIAGNOSIS — N40.0 HYPERPLASIA OF PROSTATE: ICD-10-CM

## 2018-01-01 DIAGNOSIS — Z23 NEED FOR PROPHYLACTIC VACCINATION AND INOCULATION AGAINST INFLUENZA: Primary | ICD-10-CM

## 2018-01-01 DIAGNOSIS — K21.9 GASTROESOPHAGEAL REFLUX DISEASE WITHOUT ESOPHAGITIS: ICD-10-CM

## 2018-01-01 DIAGNOSIS — F33.0 MAJOR DEPRESSIVE DISORDER, RECURRENT EPISODE, MILD (H): ICD-10-CM

## 2018-01-01 DIAGNOSIS — E78.5 HYPERLIPIDEMIA LDL GOAL <100: ICD-10-CM

## 2018-01-01 DIAGNOSIS — H66.93 BILATERAL OTITIS MEDIA WITH SPONTANEOUS RUPTURE OF EARDRUM: ICD-10-CM

## 2018-01-01 DIAGNOSIS — M25.562 BILATERAL KNEE PAIN: ICD-10-CM

## 2018-01-01 DIAGNOSIS — M25.561 BILATERAL KNEE PAIN: ICD-10-CM

## 2018-01-01 DIAGNOSIS — H61.21 IMPACTED CERUMEN OF RIGHT EAR: Primary | ICD-10-CM

## 2018-01-01 DIAGNOSIS — H72.93 BILATERAL OTITIS MEDIA WITH SPONTANEOUS RUPTURE OF EARDRUM: ICD-10-CM

## 2018-01-01 DIAGNOSIS — I10 HYPERTENSION GOAL BP (BLOOD PRESSURE) < 140/80: ICD-10-CM

## 2018-01-01 DIAGNOSIS — R94.39 ABNORMAL STRESS TEST: Primary | ICD-10-CM

## 2018-01-01 DIAGNOSIS — I25.9 CHRONIC ISCHEMIC HEART DISEASE: Primary | ICD-10-CM

## 2018-01-01 DIAGNOSIS — I20.0 UNSTABLE ANGINA PECTORIS (H): ICD-10-CM

## 2018-01-01 DIAGNOSIS — I95.9 HYPOTENSION, UNSPECIFIED HYPOTENSION TYPE: ICD-10-CM

## 2018-01-01 DIAGNOSIS — F32.0 MILD MAJOR DEPRESSION (H): ICD-10-CM

## 2018-01-01 DIAGNOSIS — R23.4 CRACKED SKIN: ICD-10-CM

## 2018-01-01 DIAGNOSIS — N40.1 BENIGN NON-NODULAR PROSTATIC HYPERPLASIA WITH LOWER URINARY TRACT SYMPTOMS: ICD-10-CM

## 2018-01-01 DIAGNOSIS — R53.1 WEAKNESS GENERALIZED: Primary | ICD-10-CM

## 2018-01-01 LAB
ANION GAP SERPL CALCULATED.3IONS-SCNC: 7 MMOL/L (ref 3–14)
BUN SERPL-MCNC: 13 MG/DL (ref 7–30)
CALCIUM SERPL-MCNC: 8.7 MG/DL (ref 8.5–10.1)
CHLORIDE SERPL-SCNC: 109 MMOL/L (ref 94–109)
CO2 SERPL-SCNC: 25 MMOL/L (ref 20–32)
CREAT SERPL-MCNC: 1.06 MG/DL (ref 0.66–1.25)
ERYTHROCYTE [DISTWIDTH] IN BLOOD BY AUTOMATED COUNT: 13.4 % (ref 10–15)
GFR SERPL CREATININE-BSD FRML MDRD: 66 ML/MIN/1.7M2
GLUCOSE SERPL-MCNC: 90 MG/DL (ref 70–99)
HCT VFR BLD AUTO: 42.7 % (ref 40–53)
HGB BLD-MCNC: 14.2 G/DL (ref 13.3–17.7)
MCH RBC QN AUTO: 28.7 PG (ref 26.5–33)
MCHC RBC AUTO-ENTMCNC: 33.3 G/DL (ref 31.5–36.5)
MCV RBC AUTO: 86 FL (ref 78–100)
PLATELET # BLD AUTO: 240 10E9/L (ref 150–450)
POTASSIUM SERPL-SCNC: 4.1 MMOL/L (ref 3.4–5.3)
RBC # BLD AUTO: 4.95 10E12/L (ref 4.4–5.9)
SODIUM SERPL-SCNC: 141 MMOL/L (ref 133–144)
TROPONIN I SERPL-MCNC: <0.015 UG/L (ref 0–0.04)
WBC # BLD AUTO: 7.6 10E9/L (ref 4–11)

## 2018-01-01 PROCEDURE — 73564 X-RAY EXAM KNEE 4 OR MORE: CPT | Mod: TC

## 2018-01-01 PROCEDURE — 93016 CV STRESS TEST SUPVJ ONLY: CPT | Performed by: INTERNAL MEDICINE

## 2018-01-01 PROCEDURE — 99214 OFFICE O/P EST MOD 30 MIN: CPT | Performed by: INTERNAL MEDICINE

## 2018-01-01 PROCEDURE — 25000128 H RX IP 250 OP 636: Performed by: INTERNAL MEDICINE

## 2018-01-01 PROCEDURE — 85027 COMPLETE CBC AUTOMATED: CPT | Performed by: INTERNAL MEDICINE

## 2018-01-01 PROCEDURE — G0008 ADMIN INFLUENZA VIRUS VAC: HCPCS

## 2018-01-01 PROCEDURE — 93017 CV STRESS TEST TRACING ONLY: CPT | Performed by: INTERNAL MEDICINE

## 2018-01-01 PROCEDURE — 99283 EMERGENCY DEPT VISIT LOW MDM: CPT | Performed by: NURSE PRACTITIONER

## 2018-01-01 PROCEDURE — 90662 IIV NO PRSV INCREASED AG IM: CPT

## 2018-01-01 PROCEDURE — 99213 OFFICE O/P EST LOW 20 MIN: CPT | Performed by: INTERNAL MEDICINE

## 2018-01-01 PROCEDURE — 78452 HT MUSCLE IMAGE SPECT MULT: CPT | Mod: 26 | Performed by: INTERNAL MEDICINE

## 2018-01-01 PROCEDURE — 36415 COLL VENOUS BLD VENIPUNCTURE: CPT | Performed by: INTERNAL MEDICINE

## 2018-01-01 PROCEDURE — 99204 OFFICE O/P NEW MOD 45 MIN: CPT | Performed by: INTERNAL MEDICINE

## 2018-01-01 PROCEDURE — 78452 HT MUSCLE IMAGE SPECT MULT: CPT

## 2018-01-01 PROCEDURE — 99203 OFFICE O/P NEW LOW 30 MIN: CPT | Performed by: ORTHOPAEDIC SURGERY

## 2018-01-01 PROCEDURE — 93000 ELECTROCARDIOGRAM COMPLETE: CPT | Performed by: INTERNAL MEDICINE

## 2018-01-01 PROCEDURE — 93018 CV STRESS TEST I&R ONLY: CPT | Performed by: INTERNAL MEDICINE

## 2018-01-01 PROCEDURE — 80048 BASIC METABOLIC PNL TOTAL CA: CPT | Performed by: INTERNAL MEDICINE

## 2018-01-01 PROCEDURE — A9502 TC99M TETROFOSMIN: HCPCS | Performed by: INTERNAL MEDICINE

## 2018-01-01 PROCEDURE — 99284 EMERGENCY DEPT VISIT MOD MDM: CPT | Mod: Z6 | Performed by: NURSE PRACTITIONER

## 2018-01-01 PROCEDURE — 84484 ASSAY OF TROPONIN QUANT: CPT | Performed by: INTERNAL MEDICINE

## 2018-01-01 PROCEDURE — 34300033 ZZH RX 343: Performed by: INTERNAL MEDICINE

## 2018-01-01 RX ORDER — FINASTERIDE 5 MG/1
TABLET, FILM COATED ORAL
Qty: 30 TABLET | Refills: 0 | Status: SHIPPED | OUTPATIENT
Start: 2018-01-01 | End: 2018-01-01

## 2018-01-01 RX ORDER — LORATADINE 10 MG/1
1 TABLET ORAL DAILY
Refills: 6 | COMMUNITY
Start: 2018-01-01 | End: 2019-01-01

## 2018-01-01 RX ORDER — ATORVASTATIN CALCIUM 20 MG/1
TABLET, FILM COATED ORAL
Qty: 90 TABLET | Refills: 0 | OUTPATIENT
Start: 2018-01-01

## 2018-01-01 RX ORDER — TERAZOSIN 5 MG/1
CAPSULE ORAL
Qty: 90 CAPSULE | Refills: 1 | Status: SHIPPED | OUTPATIENT
Start: 2018-01-01 | End: 2018-01-01

## 2018-01-01 RX ORDER — ATORVASTATIN CALCIUM 20 MG/1
TABLET, FILM COATED ORAL
Qty: 90 TABLET | Refills: 0 | Status: SHIPPED | OUTPATIENT
Start: 2018-01-01 | End: 2018-01-01

## 2018-01-01 RX ORDER — TAMSULOSIN HYDROCHLORIDE 0.4 MG/1
0.4 CAPSULE ORAL DAILY
Qty: 30 CAPSULE | Refills: 5 | Status: ON HOLD | OUTPATIENT
Start: 2018-01-01 | End: 2019-01-01

## 2018-01-01 RX ORDER — OXYBUTYNIN CHLORIDE 5 MG/1
TABLET ORAL
Qty: 60 TABLET | Refills: 0 | Status: SHIPPED | OUTPATIENT
Start: 2018-01-01 | End: 2018-01-01

## 2018-01-01 RX ORDER — BUPROPION HYDROCHLORIDE 150 MG/1
TABLET, EXTENDED RELEASE ORAL
Qty: 60 TABLET | Refills: 6 | Status: ON HOLD | OUTPATIENT
Start: 2018-01-01 | End: 2019-01-01

## 2018-01-01 RX ORDER — FINASTERIDE 5 MG/1
TABLET, FILM COATED ORAL
Qty: 30 TABLET | Refills: 5 | Status: ON HOLD | OUTPATIENT
Start: 2018-01-01 | End: 2019-01-01

## 2018-01-01 RX ORDER — ATORVASTATIN CALCIUM 20 MG/1
TABLET, FILM COATED ORAL
Qty: 90 TABLET | Refills: 1 | Status: SHIPPED | OUTPATIENT
Start: 2018-01-01 | End: 2018-01-01

## 2018-01-01 RX ORDER — TERAZOSIN 5 MG/1
CAPSULE ORAL
Qty: 90 CAPSULE | Refills: 3 | OUTPATIENT
Start: 2018-01-01

## 2018-01-01 RX ORDER — TAMSULOSIN HYDROCHLORIDE 0.4 MG/1
0.4 CAPSULE ORAL DAILY
Qty: 30 CAPSULE | Refills: 5 | Status: SHIPPED | OUTPATIENT
Start: 2018-01-01 | End: 2018-01-01

## 2018-01-01 RX ORDER — ATORVASTATIN CALCIUM 20 MG/1
TABLET, FILM COATED ORAL
Qty: 90 TABLET | Refills: 0 | Status: ON HOLD | OUTPATIENT
Start: 2018-01-01 | End: 2019-01-01

## 2018-01-01 RX ORDER — OXYBUTYNIN CHLORIDE 5 MG/1
TABLET ORAL
Qty: 60 TABLET | Refills: 5 | Status: ON HOLD | OUTPATIENT
Start: 2018-01-01 | End: 2019-01-01

## 2018-01-01 RX ORDER — LORATADINE 10 MG/1
TABLET ORAL
Qty: 30 TABLET | Refills: 6 | Status: SHIPPED | OUTPATIENT
Start: 2018-01-01 | End: 2018-01-01

## 2018-01-01 RX ORDER — TERAZOSIN 5 MG/1
CAPSULE ORAL
Qty: 90 CAPSULE | Refills: 0 | Status: SHIPPED | OUTPATIENT
Start: 2018-01-01 | End: 2018-01-01

## 2018-01-01 RX ORDER — FINASTERIDE 5 MG/1
TABLET, FILM COATED ORAL
Qty: 30 TABLET | Refills: 5 | Status: SHIPPED | OUTPATIENT
Start: 2018-01-01 | End: 2018-01-01

## 2018-01-01 RX ORDER — CEPHALEXIN 500 MG/1
500 CAPSULE ORAL 3 TIMES DAILY
Qty: 28 CAPSULE | Refills: 0 | Status: SHIPPED | OUTPATIENT
Start: 2018-01-01 | End: 2018-01-01

## 2018-01-01 RX ORDER — REGADENOSON 0.08 MG/ML
0.4 INJECTION, SOLUTION INTRAVENOUS ONCE
Status: COMPLETED | OUTPATIENT
Start: 2018-01-01 | End: 2018-01-01

## 2018-01-01 RX ORDER — LORATADINE 10 MG/1
TABLET ORAL
Qty: 30 TABLET | Refills: 5 | Status: SHIPPED | OUTPATIENT
Start: 2018-01-01 | End: 2019-01-01

## 2018-01-01 RX ORDER — OXYBUTYNIN CHLORIDE 5 MG/1
TABLET ORAL
Qty: 60 TABLET | Refills: 5 | Status: SHIPPED | OUTPATIENT
Start: 2018-01-01 | End: 2018-01-01

## 2018-01-01 RX ADMIN — TETROFOSMIN 32.9 MCI.: 1.38 INJECTION, POWDER, LYOPHILIZED, FOR SOLUTION INTRAVENOUS at 09:50

## 2018-01-01 RX ADMIN — TETROFOSMIN 10.6 MCI.: 1.38 INJECTION, POWDER, LYOPHILIZED, FOR SOLUTION INTRAVENOUS at 07:45

## 2018-01-01 RX ADMIN — REGADENOSON 0.4 MG: 0.08 INJECTION, SOLUTION INTRAVENOUS at 09:20

## 2018-01-01 ASSESSMENT — ENCOUNTER SYMPTOMS: WOUND: 1

## 2018-01-01 ASSESSMENT — PAIN SCALES - GENERAL
PAINLEVEL: NO PAIN (0)

## 2018-01-01 ASSESSMENT — PATIENT HEALTH QUESTIONNAIRE - PHQ9
SUM OF ALL RESPONSES TO PHQ QUESTIONS 1-9: 0
SUM OF ALL RESPONSES TO PHQ QUESTIONS 1-9: 2

## 2018-01-05 DIAGNOSIS — N40.1 BENIGN NON-NODULAR PROSTATIC HYPERPLASIA WITH LOWER URINARY TRACT SYMPTOMS: ICD-10-CM

## 2018-01-05 RX ORDER — TAMSULOSIN HYDROCHLORIDE 0.4 MG/1
CAPSULE ORAL
Qty: 90 CAPSULE | Refills: 0 | Status: SHIPPED | OUTPATIENT
Start: 2018-01-05 | End: 2018-01-01

## 2018-01-05 NOTE — TELEPHONE ENCOUNTER
Routing refill request to provider for review/approval because:  Medication is reported/historical    Shonna Rodriguez, RN  [facility]

## 2018-01-05 NOTE — TELEPHONE ENCOUNTER
Requested Prescriptions   Pending Prescriptions Disp Refills     tamsulosin (FLOMAX) 0.4 MG capsule [Pharmacy Med Name: TAMSULOSIN HCL 0.4MG CAPS] 90 capsule 3     Sig: TAKE ONE CAPSULE BY MOUTH ONCE DAILY    Alpha Blockers Passed    1/5/2018  1:01 AM       Passed - BP is less than 140/90    BP Readings from Last 3 Encounters:   08/11/17 138/74   07/06/17 134/68   03/06/17 102/62                Passed - Recent or future visit with authorizing provider's specialty    Patient had office visit in the last year or has a visit in the next 30 days with authorizing provider.  See chart review.              Passed - Patient does not have Tadalafil, Vardenafil, or Sildenafil on their medication list       Passed - Patient is 18 years of age or older

## 2018-01-11 DIAGNOSIS — N40.0 HYPERPLASIA OF PROSTATE: ICD-10-CM

## 2018-01-11 RX ORDER — OXYBUTYNIN CHLORIDE 5 MG/1
TABLET ORAL
Qty: 60 TABLET | Refills: 0 | Status: SHIPPED | OUTPATIENT
Start: 2018-01-11 | End: 2018-01-01

## 2018-01-11 RX ORDER — FINASTERIDE 5 MG/1
TABLET, FILM COATED ORAL
Qty: 30 TABLET | Refills: 0 | Status: SHIPPED | OUTPATIENT
Start: 2018-01-11 | End: 2018-01-01

## 2018-01-11 NOTE — TELEPHONE ENCOUNTER
"Requested Prescriptions   Pending Prescriptions Disp Refills     oxybutynin (DITROPAN) 5 MG tablet [Pharmacy Med Name: OXYBUTYNIN CHLORIDE 5MG TABS] 60 tablet 5     Sig: TAKE ONE TABLET BY MOUTH TWICE A DAY    Muscarinic Antagonists (Urinary Incontinence Agents) Passed    1/11/2018  1:01 AM       Passed - Recent or future visit with authorizing provider's specialty    Patient had office visit in the last year or has a visit in the next 30 days with authorizing provider.  See \"Patient Info\" tab in inbasket, or \"Choose Columns\" in Meds & Orders section of the refill encounter.              Passed - Medication is Oxybutynin and patient is 5 years of age or older       Passed - Patient does not have a diagnosis of glaucoma on the problem list    If glaucoma diagnosis is new, refer refill to physician.         Passed - Patient is 18 years of age or older        finasteride (PROSCAR) 5 MG tablet [Pharmacy Med Name: FINASTERIDE 5MG TABS] 30 tablet 5     Sig: TAKE ONE TABLET BY MOUTH ONCE DAILY    There is no refill protocol information for this order        "

## 2018-01-11 NOTE — TELEPHONE ENCOUNTER
Oxybutynin  Prescription approved per [facility] Refill Protocol.    Proscar  Routing refill request to provider for review/approval because:  Drug not on the [facility] refill protocol     Shonna Rodriguez RN  [facility]

## 2018-01-31 NOTE — TELEPHONE ENCOUNTER
"Requested Prescriptions   Pending Prescriptions Disp Refills     atorvastatin (LIPITOR) 20 MG tablet [Pharmacy Med Name: ATORVASTATIN 20MG TABS] 90 tablet 0     Sig: TAKE ONE TABLET BY MOUTH ONCE DAILY    Statins Protocol Failed    1/31/2018  1:00 AM       Failed - LDL on file in past 12 months    Recent Labs   Lab Test  06/27/16   1138   LDL  108*            Passed - No abnormal creatine kinase in past 12 months    No lab results found.         Passed - Recent or future visit with authorizing provider    Patient had office visit in the last year or has a visit in the next 30 days with authorizing provider.  See \"Patient Info\" tab in inbasket, or \"Choose Columns\" in Meds & Orders section of the refill encounter.            Passed - Patient is age 18 or older        terazosin (HYTRIN) 5 MG capsule [Pharmacy Med Name: TERAZOSIN HCL 5MG CAPS] 90 capsule 3     Sig: TAKE ONE CAPSULE BY MOUTH AT BEDTIME    Alpha Blockers Passed    1/31/2018  1:00 AM       Passed - BP is less than 140/90    BP Readings from Last 3 Encounters:   08/11/17 138/74   07/06/17 134/68   03/06/17 102/62                Passed - Recent or future visit with authorizing provider's specialty    Patient had office visit in the last year or has a visit in the next 30 days with authorizing provider.  See \"Patient Info\" tab in inbasket, or \"Choose Columns\" in Meds & Orders section of the refill encounter.            Passed - Patient does not have Tadalafil, Vardenafil, or Sildenafil on their medication list       Passed - Patient is 18 years of age or older          Last Written Prescription Date:  10/16/17  Last Fill Quantity: 90,  # refills: 1   Last Office Visit with [facility], [facility] or [facility] prescribing provider:  8/11/17   Future Office Visit:       "

## 2018-01-31 NOTE — TELEPHONE ENCOUNTER
Routing refill request to provider for review/approval because:  Drug not active on patient's medication list (Hytrin)  Labs out of range:  LDL  Labs not current:  LDL    Shonna Rodriguez RN  [facility]

## 2018-02-01 NOTE — TELEPHONE ENCOUNTER
Prescription was sent 1/31/18 for #90 with 0 refills.  Pharmacy notified via E-Prescribe refusal.     Shonna Rodriguez RN  [facility]

## 2018-02-02 NOTE — TELEPHONE ENCOUNTER
These meds were sent 1/31/2018. I have contacted pharmacy and confirmed these were received. Please delete request. Jasmin Quintanilla CMA ([facility])

## 2018-02-02 NOTE — TELEPHONE ENCOUNTER
Prescription was sent 1/31/18 for #90 with 0 refills.  Pharmacy notified via E-Prescribe refusal.     Shonna Rodriguez RN  [facility]

## 2018-02-05 NOTE — TELEPHONE ENCOUNTER
"Requested Prescriptions   Pending Prescriptions Disp Refills     terazosin (HYTRIN) 5 MG capsule [Pharmacy Med Name: TERAZOSIN HCL 5MG CAPS] 90 capsule 3     Sig: TAKE ONE CAPSULE BY MOUTH AT BEDTIME    Alpha Blockers Passed    2/3/2018  1:00 AM       Passed - BP is less than 140/90    BP Readings from Last 3 Encounters:   08/11/17 138/74   07/06/17 134/68   03/06/17 102/62                Passed - Recent or future visit with authorizing provider's specialty    Patient had office visit in the last year or has a visit in the next 30 days with authorizing provider.  See \"Patient Info\" tab in inbasket, or \"Choose Columns\" in Meds & Orders section of the refill encounter.            Passed - Patient does not have Tadalafil, Vardenafil, or Sildenafil on their medication list       Passed - Patient is 18 years of age or older        atorvastatin (LIPITOR) 20 MG tablet [Pharmacy Med Name: ATORVASTATIN 20MG TABS] 90 tablet 0     Sig: TAKE ONE TABLET BY MOUTH ONCE DAILY    Statins Protocol Failed    2/3/2018  1:00 AM       Failed - LDL on file in past 12 months    Recent Labs   Lab Test  06/27/16   1138   LDL  108*            Passed - No abnormal creatine kinase in past 12 months    No lab results found.         Passed - Recent or future visit with authorizing provider    Patient had office visit in the last year or has a visit in the next 30 days with authorizing provider.  See \"Patient Info\" tab in inbasket, or \"Choose Columns\" in Meds & Orders section of the refill encounter.            Passed - Patient is age 18 or older          "

## 2018-02-06 NOTE — TELEPHONE ENCOUNTER
Terazosin  Last Written Prescription Date:  1/31/18  Last Fill Quantity: 90,  # refills: 0     This is a duplicate.    Lipitor:  Last Written Prescription Date:  1/31/18  Last Fill Quantity: 90,  # refills: 0     This is a duplicate    Please call pharmacy to ensure they have received these prescriptions.  Kendy Palubmo, BSN, RN

## 2018-02-12 NOTE — TELEPHONE ENCOUNTER
Requested Prescriptions   Pending Prescriptions Disp Refills     finasteride (PROSCAR) 5 MG tablet [Pharmacy Med Name: FINASTERIDE 5MG TABS] 30 tablet 0     Sig: TAKE ONE TABLET BY MOUTH ONCE DAILY    There is no refill protocol information for this order          Last Written Prescription Date:  1/11/18  Last Fill Quantity: 30,  # refills: 0   Last office visit: 3/6/2017 with prescribing provider:  3/6/17   Future Office Visit:

## 2018-02-12 NOTE — TELEPHONE ENCOUNTER
Routing refill request to provider for review/approval because:  Uzma given x1 and patient did not follow up, please advise  Bren Ray, RN, BSN

## 2018-02-12 NOTE — TELEPHONE ENCOUNTER
Routing PROSCAR  refill request to provider for review/approval because:  Uzma given x1 ( 1/11/18)and patient did not follow up, please advise.  finasteride (PROSCAR) 5 MG tablet 30 tablet 0 1/11/2018  No   Sig: TAKE ONE TABLET BY MOUTH ONCE DAILY   Class: E-Prescribe   Notes to Pharmacy: No further refills without office visit     CARMEN Euceda

## 2018-03-15 NOTE — TELEPHONE ENCOUNTER
Medication is being filled for 1 time refill only due to:  Patient needs to be seen because it has been more than one year since last visit.  Notified patient per comment section of RX.  CARMEN Euceda

## 2018-03-15 NOTE — TELEPHONE ENCOUNTER
"Requested Prescriptions   Pending Prescriptions Disp Refills     finasteride (PROSCAR) 5 MG tablet [Pharmacy Med Name: FINASTERIDE 5MG TABS] 30 tablet 0     Sig: TAKE ONE TABLET BY MOUTH ONCE DAILY    Alpha Blockers Failed    3/15/2018  1:01 AM       Failed - Recent (12 mo) or future (30 days) visit within the authorizing provider's specialty    Patient had office visit in the last 12 months or has a visit in the next 30 days with authorizing provider or within the authorizing provider's specialty.  See \"Patient Info\" tab in inbasket, or \"Choose Columns\" in Meds & Orders section of the refill encounter.           Passed - Blood pressure under 140/90 in past 12 months    BP Readings from Last 3 Encounters:   08/11/17 138/74   07/06/17 134/68   03/06/17 102/62                Passed - Patient does not have Tadalafil, Vardenafil, or Sildenafil on their medication list       Passed - Patient is 18 years of age or older        oxybutynin (DITROPAN) 5 MG tablet [Pharmacy Med Name: OXYBUTYNIN CHLORIDE 5MG TABS] 60 tablet 0     Sig: TAKE ONE TABLET BY MOUTH TWICE A DAY    Muscarinic Antagonists (Urinary Incontinence Agents) Failed    3/15/2018  1:01 AM       Failed - Recent (12 mo) or future (30 days) visit within the authorizing provider's specialty    Patient had office visit in the last 12 months or has a visit in the next 30 days with authorizing provider or within the authorizing provider's specialty.  See \"Patient Info\" tab in inbasket, or \"Choose Columns\" in Meds & Orders section of the refill encounter.           Passed - Medication is Oxybutynin and patient is 5 years of age or older       Passed - Patient does not have a diagnosis of glaucoma on the problem list    If glaucoma diagnosis is new, refer refill to physician.         Passed - Patient is 18 years of age or older          Last Written Prescription Date:  2/13/18  Last Fill Quantity: 60, 30,  # refills: 0   Last Office Visit with [facility] or [facility] " prescribing provider:  3/6/17  Future Office Visit:

## 2018-03-26 NOTE — TELEPHONE ENCOUNTER
"Last Written Prescription Date:  3/29/2017  Last Fill Quantity: 90,  # refills: 3   Last office visit: 3/06/2017 with prescribing provider:  Dr. Yoo   Future Office Visit:    Requested Prescriptions   Pending Prescriptions Disp Refills     omeprazole (PRILOSEC) 20 MG CR capsule [Pharmacy Med Name: OMEPRAZOLE 20MG CPDR] 90 capsule 3     Sig: TAKE ONE CAPSULE BY MOUTH ONCE DAILY    PPI Protocol Failed    3/26/2018  1:01 AM       Failed - Recent (12 mo) or future (30 days) visit within the authorizing provider's specialty    Patient had office visit in the last 12 months or has a visit in the next 30 days with authorizing provider or within the authorizing provider's specialty.  See \"Patient Info\" tab in inbasket, or \"Choose Columns\" in Meds & Orders section of the refill encounter.           Passed - Not on Clopidogrel (unless Pantoprazole ordered)       Passed - No diagnosis of osteoporosis on record       Passed - Patient is age 18 or older          "

## 2018-03-26 NOTE — LETTER
[address] [id number] [phone]        Dean Mccarty  [address]  [facility] [address]      March 29, 2018      Dear Dean,    I care about your health and have reviewed your health plan, including your medical conditions, medication list, and lab results and am making recommendations based on this review, to better manage your health.    I am recommending that you:  -schedule a FOLLOWUP OFFICE APPOINTMENT with me.  I will recheck your: Lipids (fasting cholesterol - nothing to eat except water and/or meds for 8+ hours), BMP (basic metabolic panel) and Microablumin tests.    If you've had the preventative screening completed at another facility or feel you're not due for this screening, please call our clinic at the number listed above or send us a My Chart message so we can update our records. We would like to thank you in advance for taking the time to take care of your health.  If you have any questions, please don t hesitate to contact our clinic.    Sincerely,       Your [facility]

## 2018-03-28 NOTE — TELEPHONE ENCOUNTER
Tried to reach out to patient and was unable to leave a message due to no voicemail or mailbox was full. Will try again at a later time.    Thank you,  Hoa Little   for [facility]

## 2018-03-28 NOTE — TELEPHONE ENCOUNTER
Uzma refill given per RN protocol.   Please contact patient to have them schedule the following: Office visit  Bren Ray RN

## 2018-04-16 NOTE — TELEPHONE ENCOUNTER
Routing refill request to provider for review/approval because:  Patient being seen tomorrow by PCP. Will forward to provider to review at appt.  Brooke Maxwell RN

## 2018-04-16 NOTE — TELEPHONE ENCOUNTER
"Requested Prescriptions   Pending Prescriptions Disp Refills     buPROPion (WELLBUTRIN SR) 150 MG 12 hr tablet [Pharmacy Med Name: BUPROPION HCL ER (SR) 150MG TB12] 60 tablet 6     Sig: TAKE ONE TABLET BY MOUTH TWICE A DAY    SSRIs Protocol Failed    4/14/2018  1:01 AM       Failed - PHQ-9 score less than 5 in past 6 months    Please review last PHQ-9 score.          Passed - Medication is Bupropion    If the medication is Bupropion (Wellbutrin), and the patient is taking for smoking cessation; OK to refill.         Passed - Patient is age 18 or older       Passed - Recent (6 mo) or future (30 days) visit within the authorizing provider's specialty    Patient had office visit in the last 6 months or has a visit in the next 30 days with authorizing provider or within the authorizing provider's specialty.  See \"Patient Info\" tab in inbasket, or \"Choose Columns\" in Meds & Orders section of the refill encounter.            oxybutynin (DITROPAN) 5 MG tablet [Pharmacy Med Name: OXYBUTYNIN CHLORIDE 5MG TABS] 60 tablet 0     Sig: TAKE ONE TABLET BY MOUTH TWICE A DAY    Muscarinic Antagonists (Urinary Incontinence Agents) Passed    4/14/2018  1:01 AM       Passed - Recent (12 mo) or future (30 days) visit within the authorizing provider's specialty    Patient had office visit in the last 12 months or has a visit in the next 30 days with authorizing provider or within the authorizing provider's specialty.  See \"Patient Info\" tab in inbasket, or \"Choose Columns\" in Meds & Orders section of the refill encounter.           Passed - Medication is Oxybutynin and patient is 5 years of age or older       Passed - Patient does not have a diagnosis of glaucoma on the problem list    If glaucoma diagnosis is new, refer refill to physician.         Passed - Patient is 18 years of age or older        finasteride (PROSCAR) 5 MG tablet [Pharmacy Med Name: FINASTERIDE 5MG TABS] 30 tablet 0     Sig: TAKE ONE TABLET BY MOUTH ONCE DAILY    " "Alpha Blockers Passed    4/14/2018  1:01 AM       Passed - Blood pressure under 140/90 in past 12 months    BP Readings from Last 3 Encounters:   08/11/17 138/74   07/06/17 134/68   03/06/17 102/62                Passed - Recent (12 mo) or future (30 days) visit within the authorizing provider's specialty    Patient had office visit in the last 12 months or has a visit in the next 30 days with authorizing provider or within the authorizing provider's specialty.  See \"Patient Info\" tab in inbasket, or \"Choose Columns\" in Meds & Orders section of the refill encounter.           Passed - Patient does not have Tadalafil, Vardenafil, or Sildenafil on their medication list       Passed - Patient is 18 years of age or older        loratadine (CLARITIN) 10 MG tablet [Pharmacy Med Name: LORATADINE 10MG TABS] 30 tablet 6     Sig: TAKE ONE TABLET BY MOUTH ONCE DAILY    Antihistamines Protocol Failed    4/14/2018  1:01 AM       Failed - Patient is 3-64 years of age    Apply weight-based dosing for peds patients age 3 - 12 years of age.    Forward request to provider for patients under the age of 3 or over the age of 64.         Passed - Recent (12 mo) or future (30 days) visit within the authorizing provider's specialty    Patient had office visit in the last 12 months or has a visit in the next 30 days with authorizing provider or within the authorizing provider's specialty.  See \"Patient Info\" tab in inbasket, or \"Choose Columns\" in Meds & Orders section of the refill encounter.            buPROPion  Last Written Prescription Date:  8/30/17  Last Fill Quantity: 60,  # refills: 6   Last office visit: 8/9/2016 with prescribing provider:     Future Office Visit:   Next 5 appointments (look out 90 days)     Apr 17, 2018  2:00 PM CDT   [facility] DO   [facility]    [address] [facility] [id number] [phone]                     oxbutynin  Last " Written Prescription Date:  3/15/18  Last Fill Quantity: 60,  # refills: 0   Last office visit: 8/9/2016 with prescribing provider:     Future Office Visit:   Next 5 appointments (look out 90 days)     Apr 17, 2018  2:00 PM CDT   [facility]    [address] [facility] [address]   [phone]                     finasteride  Last Written Prescription Date:  3/15/18  Last Fill Quantity: 30,  # refills: 0   Last office visit: 8/9/2016 with prescribing provider:     Future Office Visit:   Next 5 appointments (look out 90 days)     Apr 17, 2018  2:00 PM CDT   [facility]    [address] [facility] [id number] [phone]                     loratadine   Last Written Prescription Date:  8/30/17  Last Fill Quantity: 30,  # refills: 6   Last office visit: 8/9/2016 with prescribing provider:     Future Office Visit:   Next 5 appointments (look out 90 days)     Apr 17, 2018  2:00 PM CDT   [facility]    [address] [facility] [address] [phone]

## 2018-04-17 NOTE — MR AVS SNAPSHOT
"              After Visit Summary   4/17/2018    Dean Mccarty    MRN: [id number]           Patient Information     Date Of Birth          7/6/1928        Visit Information        Provider Department      4/17/2018 2:00 PM Leobardo Yoo DO [facility]        Today's Diagnoses     Chronic ischemic heart disease    -  1    Hypertension goal BP (blood pressure) < 140/80        Mild major depression (H)        Hyperlipidemia LDL goal <100        Gastroesophageal reflux disease without esophagitis           Follow-ups after your visit        Who to contact     If you have questions or need follow up information about today's clinic visit or your schedule please contact [facility] directly at [phone].  Normal or non-critical lab and imaging results will be communicated to you by MyChart, letter or phone within 4 business days after the clinic has received the results. If you do not hear from us within 7 days, please contact the clinic through MyChart or phone. If you have a critical or abnormal lab result, we will notify you by phone as soon as possible.  Submit refill requests through [employer] or call your pharmacy and they will forward the refill request to us. Please allow 3 business days for your refill to be completed.          Additional Information About Your Visit        MyChart Information     [employer] lets you send messages to your doctor, view your test results, renew your prescriptions, schedule appointments and more. To sign up, go to [email or web address] . Click on \"Log in\" on the left side of the screen, which will take you to the Welcome page. Then click on \"Sign up Now\" on the right side of the page.     You will be asked to enter the access code listed below, as well as some personal information. Please follow the directions to create your username and password.     Your access code is: [email or web address]  Expires: 7/16/2018  5:42 PM     Your access code will " " in 90 days. If you need help or a new code, please call your [place] clinic or [phone].        Care EveryWhere ID     This is your Care EveryWhere ID. This could be used by other organizations to access your [place] medical records  [id number]        Your Vitals Were     Pulse Temperature Respirations Height Pulse Oximetry BMI (Body Mass Index)    100 98.1  F (36.7  C) (Temporal) 14 5' 11\" (1.803 m) 98% 30.4 kg/m2       Blood Pressure from Last 3 Encounters:   18 138/68   17 138/74   17 134/68    Weight from Last 3 Encounters:   18 218 lb (98.9 kg)   17 214 lb (97.1 kg)   17 214 lb (97.1 kg)              Today, you had the following     No orders found for display         Today's Medication Changes          These changes are accurate as of 18  5:42 PM.  If you have any questions, ask your nurse or doctor.               These medicines have changed or have updated prescriptions.        Dose/Directions    ranitidine 300 MG tablet   Commonly known as:  ZANTAC   This may have changed:  Another medication with the same name was removed. Continue taking this medication, and follow the directions you see here.   Used for:  Gastroesophageal reflux disease without esophagitis   Changed by:  Leobardo Yoo DO        TAKE ONE TABLET BY MOUTH AT BEDTIME   Quantity:  90 tablet   Refills:  1       tamsulosin 0.4 MG capsule   Commonly known as:  FLOMAX   This may have changed:  Another medication with the same name was removed. Continue taking this medication, and follow the directions you see here.   Used for:  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 tract symptoms   Changed by:  Leobardo Yoo DO        Dose:  0.4 mg   Take 1 capsule (0.4 mg) by mouth daily Appointment needed for additional refills.   Quantity:  30 capsule   Refills:  0         Stop taking these medicines if you haven't already. Please contact your care team if " you have questions.     fluticasone 50 MCG/ACT spray   Commonly known as:  FLONASE   Stopped by:  Leobardo Yoo DO           loratadine 10 MG tablet   Commonly known as:  CLARITIN   Stopped by:  Leobardo Yoo DO           meclizine 25 MG tablet   Commonly known as:  ANTIVERT   Stopped by:  Leobardo Yoo DO           polyethylene glycol powder   Commonly known as:  MIRALAX/GLYCOLAX   Stopped by:  Leobardo Yoo DO                    Primary Care Provider Office Phone # Fax #    Leobardo Yoo [phone] [address] [place] [address]        Equal Access to Services     [facility] : Ankit powell Sotalya, waaldoda luqadaha, qaybta kaalmada ambika, mary carmen bowser. So [facility] [phone].    ATENCIÓN: Si habla español, tiene a machado disposición servicios gratuitos de asistencia lingüística. Llame al [phone].    We comply with applicable federal civil rights laws and [place] laws. We do not discriminate on the basis of race, color, national origin, age, disability, sex, sexual orientation, or gender identity.            Thank you!     Thank you for choosing [facility]  for your care. Our goal is always to provide you with excellent care. Hearing back from our patients is one way we can continue to improve our services. Please take a few minutes to complete the written survey that you may receive in the mail after your visit with us. Thank you!             Your Updated Medication List - Protect others around you: Learn how to safely use, store and throw away your medicines at www.disposemymeds.org.          This list is accurate as of 4/17/18  5:42 PM.  Always use your most recent med list.                   Brand Name Dispense Instructions for use Diagnosis    aspirin 81 MG tablet      Take 1 tablet by mouth daily.        * atorvastatin 20 MG tablet    LIPITOR    90 tablet     TAKE ONE TABLET BY MOUTH ONCE DAILY    Hyperlipidemia LDL goal <100, Chronic ischemic heart disease       * atorvastatin 20 MG tablet    LIPITOR    90 tablet    TAKE ONE TABLET BY MOUTH ONCE DAILY    Hyperlipidemia LDL goal <100, Chronic ischemic heart disease       buPROPion 150 MG 12 hr tablet    WELLBUTRIN SR    60 tablet    TAKE ONE TABLET BY MOUTH TWICE A DAY    Major depressive disorder, recurrent episode, mild (H)       erythromycin ophthalmic ointment    ROMYCIN    1 Tube    Place 0.25 inches into the right eye 4 times daily O.25 inches into eye(s) 4 times daily    Blepharitis of both upper and lower eyelid of right eye, unspecified type       finasteride 5 MG tablet    PROSCAR    30 tablet    TAKE ONE TABLET BY MOUTH ONCE DAILY    Hyperplasia of prostate       HYDROcodone-acetaminophen 5-325 MG per tablet    NORCO    42 tablet    Take 1 tablet by mouth every 8 hours as needed for moderate to severe pain    Compression fracture       ibuprofen 600 MG tablet    ADVIL/MOTRIN    40 tablet    Take 1 tablet (600 mg) by mouth every 6 hours as needed for other (mild pain)    Acute pharyngitis due to other specified organisms       nitroGLYcerin 0.4 MG sublingual tablet    NITROSTAT    25 tablet    Place 1 tablet (0.4 mg) under the tongue every 5 minutes as needed for chest pain If you are still having symptoms after 3 doses (15 minutes) call 911.    Chronic ischemic heart disease, unspecified       omeprazole 20 MG CR capsule    priLOSEC    30 capsule    TAKE ONE CAPSULE BY MOUTH ONCE DAILY    Gastroesophageal reflux disease without esophagitis       order for DME      1 Device Respironics System One Auto CPAP @ 8 cm .        oxybutynin 5 MG tablet    DITROPAN    60 tablet    TAKE ONE TABLET BY MOUTH TWICE A DAY    Hyperplasia of prostate       ranitidine 300 MG tablet    ZANTAC    90 tablet    TAKE ONE TABLET BY MOUTH AT BEDTIME    Gastroesophageal reflux disease without esophagitis       sertraline 100 MG  tablet    ZOLOFT    90 tablet    Take 1 tablet (100 mg) by mouth daily    Major depressive disorder, recurrent episode, mild (H)       tamsulosin 0.4 MG capsule    FLOMAX    30 capsule    Take 1 capsule (0.4 mg) by mouth daily Appointment needed for additional refills.    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 tract symptoms       terazosin 5 MG capsule    HYTRIN    90 capsule    TAKE ONE CAPSULE BY MOUTH AT BEDTIME    Hypertension goal BP (blood pressure) < 140/80       vitamin E 400 UNIT capsule     QS    AS DIRECTED        * Notice:  This list has 2 medication(s) that are the same as other medications prescribed for you. Read the directions carefully, and ask your doctor or other care provider to review them with you.

## 2018-04-17 NOTE — NURSING NOTE
"Chief Complaint   Patient presents with     Recheck Medication     Flomax       Initial /68  Pulse 100  Temp 98.1  F (36.7  C) (Temporal)  Resp 14  Ht 5' 11\" (1.803 m)  Wt 218 lb (98.9 kg)  SpO2 98%  BMI 30.4 kg/m2 Estimated body mass index is 30.4 kg/(m^2) as calculated from the following:    Height as of this encounter: 5' 11\" (1.803 m).    Weight as of this encounter: 218 lb (98.9 kg).  Medication Reconciliation: complete  Jasmin Quintanilla CMA (AAMA)    "

## 2018-04-17 NOTE — PROGRESS NOTES
Chief Complaint   Patient presents with     Recheck Medication     Flomax       SUBJECTIVE:   Dean Mccarty is a 89 year old male who presents to clinic today for the following health issues:    Dean is a pleasant 89 yr old male who presents for medication check on follow up on chronic management. He reports he fell last week and hit his face a bit on the ground and broke his glasses. He is fine but has some residual L wrist discomfort and the occasion neck pain. He reports both are mild and intermittent and are not a concern to him. He needs refills on medications but would like to talk about cutting back on the number of medications he his taking. We went through his medication list and discontinued the ones in the chart that he had really discontinued taking. Instructed he can stop ranitidine, omeprazole, loratadine, Flonase, miralax, and meclizine. We did not stop any cholesterol, BP, urinary, depression, or pain medications. He reports no problems or side effects with any of his medications.       Problem list and histories reviewed & adjusted, as indicated.  Additional history: as documented    Patient Active Problem List   Diagnosis     Chronic ischemic heart disease     Mild major depression (H)     HYPERLIPIDEMIA LDL GOAL <100     Hypertension goal BP (blood pressure) < 140/80     Advanced directives, counseling/discussion     Adjustment disorder with anxiety     Hyperplasia of prostate     Esophageal reflux     Major depressive disorder, recurrent episode, mild (H)     Acute epiglottitis without airway obstruction - possible     Seasonal allergic rhinitis, unspecified allergic rhinitis trigger     Influenza A     Hypotension, unspecified hypotension type     Abnormal chest x-ray - infiltrate right base     Weakness generalized     Abnormal gait     Possible acute kidney injury (H) - admit Cr 1.37, baseline 1.09     Past Surgical History:   Procedure Laterality Date     C ANESTH,DX ARTHROSCOPIC PROC  KNEE JOINT  12/12/05    Left knee, with partial meniscectomy.     HC REMOVE TONSILS/ADENOIDS,<13 Y/O      T & A <12y.o.     HC UGI ENDOSCOPY DIAG W BIOPSY  08/25/09       Social History   Substance Use Topics     Smoking status: Former Smoker     Packs/day: 2.00     Years: 25.00     Types: Cigarettes     Quit date: 1/1/1970     Smokeless tobacco: Never Used     Alcohol use No      Comment: rarely     Family History   Problem Relation Age of Onset     DIABETES Maternal Grandmother          Current Outpatient Prescriptions   Medication Sig Dispense Refill     buPROPion (WELLBUTRIN SR) 150 MG 12 hr tablet TAKE ONE TABLET BY MOUTH TWICE A DAY 60 tablet 6     oxybutynin (DITROPAN) 5 MG tablet TAKE ONE TABLET BY MOUTH TWICE A DAY 60 tablet 0     finasteride (PROSCAR) 5 MG tablet TAKE ONE TABLET BY MOUTH ONCE DAILY 30 tablet 0     tamsulosin (FLOMAX) 0.4 MG capsule Take 1 capsule (0.4 mg) by mouth daily Appointment needed for additional refills. 30 capsule 0     omeprazole (PRILOSEC) 20 MG CR capsule TAKE ONE CAPSULE BY MOUTH ONCE DAILY 30 capsule 0     terazosin (HYTRIN) 5 MG capsule TAKE ONE CAPSULE BY MOUTH AT BEDTIME 90 capsule 0     atorvastatin (LIPITOR) 20 MG tablet TAKE ONE TABLET BY MOUTH ONCE DAILY 90 tablet 0     ranitidine (ZANTAC) 300 MG tablet TAKE ONE TABLET BY MOUTH AT BEDTIME 90 tablet 1     sertraline (ZOLOFT) 100 MG tablet Take 1 tablet (100 mg) by mouth daily 90 tablet 0     HYDROcodone-acetaminophen (NORCO) 5-325 MG per tablet Take 1 tablet by mouth every 8 hours as needed for moderate to severe pain 42 tablet 0     ibuprofen (ADVIL,MOTRIN) 600 MG tablet Take 1 tablet (600 mg) by mouth every 6 hours as needed for other (mild pain) 40 tablet 0     aspirin 81 MG tablet Take 1 tablet by mouth daily.       VITAMIN E 400 IU OR CAPS AS DIRECTED QS 0     atorvastatin (LIPITOR) 20 MG tablet TAKE ONE TABLET BY MOUTH ONCE DAILY 90 tablet 0     erythromycin (ROMYCIN) ophthalmic ointment Place 0.25 inches into the  right eye 4 times daily O.25 inches into eye(s) 4 times daily (Patient not taking: Reported on 4/17/2018) 1 Tube 0     nitroglycerin (NITROSTAT) 0.4 MG SL tablet Place 1 tablet (0.4 mg) under the tongue every 5 minutes as needed for chest pain If you are still having symptoms after 3 doses (15 minutes) call 911. (Patient not taking: Reported on 4/17/2018) 25 tablet 3     ORDER FOR DME 1 Device Respironics System One Auto CPAP @ 8 cm .        Allergies   Allergen Reactions     No Known Allergies      Recent Labs   Lab Test  02/22/17   0525  02/21/17   0925  08/01/16   1157  06/27/16   1138   06/17/15   1030  02/25/14   0840   LDL   --    --    --   108*   --   33  41   HDL   --    --    --   41   --   46  47   TRIG   --    --    --   198*   --   157*  81   ALT   --   17  13   --    --   18  32   CR  1.17  1.37*  1.09  1.00   < >  1.00  0.95   GFRESTIMATED  59*  49*  64  71   < >  71  75   GFRESTBLACK  71  59*  77  85   < >  86  >90   POTASSIUM  3.9  3.9  4.4  4.2   --   4.0  4.3   TSH   --    --    --   3.46   --    --    --     < > = values in this interval not displayed.      BP Readings from Last 3 Encounters:   04/17/18 138/68   08/11/17 138/74   07/06/17 134/68    Wt Readings from Last 3 Encounters:   04/17/18 218 lb (98.9 kg)   08/11/17 214 lb (97.1 kg)   03/06/17 214 lb (97.1 kg)                  Labs reviewed in EPIC    Reviewed and updated as needed this visit by clinical staff  Tobacco  Allergies  Meds  Med Hx  Surg Hx  Fam Hx  Soc Hx      Reviewed and updated as needed this visit by Provider         ROS:  CONSTITUTIONAL: NEGATIVE for fever, chills, change in weight  INTEGUMENTARY/SKIN: NEGATIVE for worrisome rashes, moles or lesions  EYES: NEGATIVE for vision changes or irritation  ENT/MOUTH: NEGATIVE for ear, mouth and throat problems  RESP: NEGATIVE for significant cough or SOB  CV: NEGATIVE for chest pain, palpitations or peripheral edema  GI: NEGATIVE for nausea, abdominal pain, heartburn, or  "change in bowel habits  : NEGATIVE for frequency, dysuria, or hematuria  MUSCULOSKELETAL: NEGATIVE for significant arthralgias or myalgia  MUSCULOSKELETAL:Reports mild intermittent wrist pain since falling one week ago.  NEURO: NEGATIVE for weakness, dizziness or paresthesias  ENDOCRINE: NEGATIVE for temperature intolerance, skin/hair changes  HEME: NEGATIVE for bleeding problems  PSYCHIATRIC: NEGATIVE for changes in mood or affect    OBJECTIVE:     /68  Pulse 100  Temp 98.1  F (36.7  C) (Temporal)  Resp 14  Ht 5' 11\" (1.803 m)  Wt 218 lb (98.9 kg)  SpO2 98%  BMI 30.4 kg/m2  Body mass index is 30.4 kg/(m^2).  GENERAL: healthy, alert and no distress  EYES: Eyes grossly normal to inspection, PERRL and conjunctivae and sclerae normal  HENT: ear canals and TM's normal, nose and mouth without ulcers or lesions  NECK: no adenopathy, no asymmetry, masses, or scars and thyroid normal to palpation  RESP: lungs clear to auscultation - no rales, rhonchi or wheezes  CV: regular rate and rhythm, normal S1 S2, no S3 or S4, no murmur, click or rub, no peripheral edema and peripheral pulses strong  ABDOMEN: soft, nontender, no hepatosplenomegaly, no masses and bowel sounds normal  MS: no gross musculoskeletal defects noted, no edema  SKIN: no suspicious lesions or rashes  NEURO: Normal strength and tone, mentation intact and speech normal  PSYCH: mentation appears normal, affect normal/bright    Diagnostic Test Results:  none     ASSESSMENT/PLAN:       1. Hypertension goal BP (blood pressure) < 140/80  Stable. Continue medication as directed.     2. Chronic ischemic heart disease  Stable    3. Mild major depression (H)  Stable. Continue medication as directed    4. Hyperlipidemia LDL goal <100  Stable. Continue medication as directed.    5. Gastroesophageal reflux disease without esophagitis  Stable. Can stop omeprazole. Restart omeprazole if symptoms worsen.      FUTURE APPOINTMENTS:       - Follow-up visit in 6 " months        This patient has been interviewed, examined, diagnosed, and informed of the above by me personally.  Medical records and available pertinent information has been reviewed by me personally.  All decisions and discussion have been between myself and the patient/family.  This was done in the presence of Jose Buck,MS4 , who acted as a medical scribe and recorded the events above.  No diagnosis or decision making was made by the above-mentioned scribe.  The patient, and or his/her ensurors will not be billed for the presence or actions of this scribe.  The information recorded by the scribe has been reviewed by me and found to be accurate.      Leobardo Yoo, [facility]

## 2018-04-30 NOTE — TELEPHONE ENCOUNTER
"Terazosin and Atorvastatin    Last Written Prescription Date:  1/31/2018  Last Fill Quantity: 90,  # refills: 0   Last office visit: 3/6/2017 with prescribing provider:  Dr. Saenz   Future Office Visit:      Requested Prescriptions   Pending Prescriptions Disp Refills     terazosin (HYTRIN) 5 MG capsule [Pharmacy Med Name: TERAZOSIN HCL 5MG CAPS] 90 capsule 0     Sig: TAKE ONE CAPSULE BY MOUTH AT BEDTIME    Alpha Blockers Passed    4/30/2018  1:01 AM       Passed - Blood pressure under 140/90 in past 12 months    BP Readings from Last 3 Encounters:   04/17/18 138/68   08/11/17 138/74   07/06/17 134/68                Passed - Recent (12 mo) or future (30 days) visit within the authorizing provider's specialty    Patient had office visit in the last 12 months or has a visit in the next 30 days with authorizing provider or within the authorizing provider's specialty.  See \"Patient Info\" tab in inbasket, or \"Choose Columns\" in Meds & Orders section of the refill encounter.           Passed - Patient does not have Tadalafil, Vardenafil, or Sildenafil on their medication list       Passed - Patient is 18 years of age or older        atorvastatin (LIPITOR) 20 MG tablet [Pharmacy Med Name: ATORVASTATIN 20MG TABS] 90 tablet 0     Sig: TAKE ONE TABLET BY MOUTH ONCE DAILY    Statins Protocol Failed    4/30/2018  1:01 AM       Failed - LDL on file in past 12 months    Recent Labs   Lab Test  06/27/16   1138   LDL  108*            Passed - No abnormal creatine kinase in past 12 months    Recent Labs   Lab Test  02/21/17   0925   CKT  484*               Passed - Recent (12 mo) or future (30 days) visit within the authorizing provider's specialty    Patient had office visit in the last 12 months or has a visit in the next 30 days with authorizing provider or within the authorizing provider's specialty.  See \"Patient Info\" tab in inbasket, or \"Choose Columns\" in Meds & Orders section of the refill encounter.           Passed - " Patient is age 18 or older

## 2018-05-01 NOTE — TELEPHONE ENCOUNTER
Routing refill request to provider for review/approval because:  Labs out of range:  Creatinine, LDL    Shonna Rodriguez, RN  [facility]

## 2018-05-11 NOTE — TELEPHONE ENCOUNTER
"Requested Prescriptions   Pending Prescriptions Disp Refills     tamsulosin (FLOMAX) 0.4 MG capsule [Pharmacy Med Name: TAMSULOSIN HCL 0.4MG CAPS] 30 capsule 0     Sig: TAKE ONE CAPSULE BY MOUTH ONCE DAILY ...MUST SEE DOCTOR FOR MORE REFILLS...    Alpha Blockers Passed    5/11/2018  1:01 AM       Passed - Blood pressure under 140/90 in past 12 months    BP Readings from Last 3 Encounters:   04/17/18 138/68   08/11/17 138/74   07/06/17 134/68                Passed - Recent (12 mo) or future (30 days) visit within the authorizing provider's specialty    Patient had office visit in the last 12 months or has a visit in the next 30 days with authorizing provider or within the authorizing provider's specialty.  See \"Patient Info\" tab in inbasket, or \"Choose Columns\" in Meds & Orders section of the refill encounter.           Passed - Patient does not have Tadalafil, Vardenafil, or Sildenafil on their medication list       Passed - Patient is 18 years of age or older        omeprazole (PRILOSEC) 20 MG CR capsule [Pharmacy Med Name: OMEPRAZOLE 20MG CPDR] 30 capsule 0     Sig: TAKE ONE CAPSULE BY MOUTH ONCE DAILY    PPI Protocol Passed    5/11/2018  1:01 AM       Passed - Not on Clopidogrel (unless Pantoprazole ordered)       Passed - No diagnosis of osteoporosis on record       Passed - Recent (12 mo) or future (30 days) visit within the authorizing provider's specialty    Patient had office visit in the last 12 months or has a visit in the next 30 days with authorizing provider or within the authorizing provider's specialty.  See \"Patient Info\" tab in inbasket, or \"Choose Columns\" in Meds & Orders section of the refill encounter.           Passed - Patient is age 18 or older          Last Written Prescription Date:  4/9/18  Last Fill Quantity: 30,  # refills: 0   Last Office Visit with G, P or [facility] prescribing provider:  4/17/18   Future Office Visit:       Omeprazole 20 MG       Last Written Prescription Date:  " 3/28/18  Last Fill Quantity: 30,   # refills: 0  /Last Office Visit: 4/17/18  Future Office visit:

## 2018-05-11 NOTE — TELEPHONE ENCOUNTER
Routing refill request to provider for review/approval because:  Omeprazole was stopped at last OV. Will have provider review refill request.    Shonna Rodriguez RN  [facility]

## 2018-05-16 PROBLEM — M17.0 PRIMARY OSTEOARTHRITIS OF BOTH KNEES: Status: ACTIVE | Noted: 2018-01-01

## 2018-05-16 NOTE — MR AVS SNAPSHOT
"              After Visit Summary   2018    Dean Mccarty    MRN: [id number]           Patient Information     Date Of Birth          1928        Visit Information        Provider Department      2018 2:00 PM Hernandez Villareal,  [facility]        Today's Diagnoses     Bilateral knee pain    -  1       Follow-ups after your visit        Who to contact     If you have questions or need follow up information about today's clinic visit or your schedule please contact [facility] directly at [phone].  Normal or non-critical lab and imaging results will be communicated to you by [employer], letter or phone within 4 business days after the clinic has received the results. If you do not hear from us within 7 days, please contact the clinic through [employer] or phone. If you have a critical or abnormal lab result, we will notify you by phone as soon as possible.  Submit refill requests through [employer] or call your pharmacy and they will forward the refill request to us. Please allow 3 business days for your refill to be completed.          Additional Information About Your Visit        MyChart Information     [employer] lets you send messages to your doctor, view your test results, renew your prescriptions, schedule appointments and more. To sign up, go to [email or web address] . Click on \"Log in\" on the left side of the screen, which will take you to the Welcome page. Then click on \"Sign up Now\" on the right side of the page.     You will be asked to enter the access code listed below, as well as some personal information. Please follow the directions to create your username and password.     Your access code is: [email or web address]  Expires: 2018  5:42 PM     Your access code will  in 90 days. If you need help or a new code, please call your [facility] or [phone].        Care EveryWhere ID     This is your Care EveryWhere ID. This could be used by " "other organizations to access your [place] medical records  [id number]        Your Vitals Were     Height BMI (Body Mass Index)                1.803 m (5' 11\") 30.4 kg/m2           Blood Pressure from Last 3 Encounters:   05/16/18 120/60   04/17/18 138/68   08/11/17 138/74    Weight from Last 3 Encounters:   05/16/18 98.9 kg (218 lb)   04/17/18 98.9 kg (218 lb)   08/11/17 97.1 kg (214 lb)               Primary Care Provider Office Phone # Fax #    Leobardo Yoo,  [phone] [address] [place] [address]        Equal Access to Services     ANUM ALAN : Ankit gerardoo Sotalya, waaxda luqadaha, qaybta kaalmada adeegyada, mary carmen bowser. So [facility] [phone].    ATENCIÓN: Si habla español, tiene a machado disposición servicios gratuitos de asistencia lingüística. [facility] [phone].    We comply with applicable federal civil rights laws and [place] laws. We do not discriminate on the basis of race, color, national origin, age, disability, sex, sexual orientation, or gender identity.            Thank you!     Thank you for choosing [facility]  for your care. Our goal is always to provide you with excellent care. Hearing back from our patients is one way we can continue to improve our services. Please take a few minutes to complete the written survey that you may receive in the mail after your visit with us. Thank you!             Your Updated Medication List - Protect others around you: Learn how to safely use, store and throw away your medicines at www.disposemymeds.org.          This list is accurate as of 5/16/18  2:37 PM.  Always use your most recent med list.                   Brand Name Dispense Instructions for use Diagnosis    aspirin 81 MG tablet      Take 1 tablet by mouth daily.        atorvastatin 20 MG tablet    LIPITOR    90 tablet    TAKE ONE TABLET BY MOUTH ONCE DAILY    Hyperlipidemia LDL goal <100, Chronic " ischemic heart disease       buPROPion 150 MG 12 hr tablet    WELLBUTRIN SR    60 tablet    TAKE ONE TABLET BY MOUTH TWICE A DAY    Major depressive disorder, recurrent episode, mild (H)       erythromycin ophthalmic ointment    ROMYCIN    1 Tube    Place 0.25 inches into the right eye 4 times daily O.25 inches into eye(s) 4 times daily    Blepharitis of both upper and lower eyelid of right eye, unspecified type       finasteride 5 MG tablet    PROSCAR    30 tablet    TAKE ONE TABLET BY MOUTH ONCE DAILY    Hyperplasia of prostate       HYDROcodone-acetaminophen 5-325 MG per tablet    NORCO    42 tablet    Take 1 tablet by mouth every 8 hours as needed for moderate to severe pain    Compression fracture       ibuprofen 600 MG tablet    ADVIL/MOTRIN    40 tablet    Take 1 tablet (600 mg) by mouth every 6 hours as needed for other (mild pain)    Acute pharyngitis due to other specified organisms       nitroGLYcerin 0.4 MG sublingual tablet    NITROSTAT    25 tablet    Place 1 tablet (0.4 mg) under the tongue every 5 minutes as needed for chest pain If you are still having symptoms after 3 doses (15 minutes) call 911.    Chronic ischemic heart disease, unspecified       omeprazole 20 MG CR capsule    priLOSEC    30 capsule    TAKE ONE CAPSULE BY MOUTH ONCE DAILY    Gastroesophageal reflux disease without esophagitis       order for DME      1 Device Respironics System One Auto CPAP @ 8 cm .        oxybutynin 5 MG tablet    DITROPAN    60 tablet    TAKE ONE TABLET BY MOUTH TWICE A DAY    Hyperplasia of prostate       ranitidine 300 MG tablet    ZANTAC    90 tablet    TAKE ONE TABLET BY MOUTH AT BEDTIME    Gastroesophageal reflux disease without esophagitis       sertraline 100 MG tablet    ZOLOFT    90 tablet    Take 1 tablet (100 mg) by mouth daily    Major depressive disorder, recurrent episode, mild (H)       tamsulosin 0.4 MG capsule    FLOMAX    30 capsule    Take 1 capsule (0.4 mg) by mouth daily    Benign  non-nodular prostatic hyperplasia with lower urinary tract symptoms       terazosin 5 MG capsule    HYTRIN    90 capsule    TAKE ONE CAPSULE BY MOUTH AT BEDTIME    Hypertension goal BP (blood pressure) < 140/80       vitamin E 400 UNIT capsule     QS    AS DIRECTED

## 2018-05-16 NOTE — LETTER
"    5/16/2018         RE: Dean Mccarty  [address]  [facility] [id number]        Dear Colleague,    Thank you for referring your patient, Dean Mccarty, to the [facility]. Please see a copy of my visit note below.    ORTHOPEDIC CONSULT      Chief Complaint: Dean Mccarty is a 89 year old male who is being seen for Chief Complaint   Patient presents with     Consult     b/l knee pain        History of Present Illness:   Presents with his wife.  Right greater than left bilateral knee pain going on for \"a long time.  No specific traumas or injuries.  Pain would wax and wane.  Traditionally the right is worse than left.  Few days ago the right knee significantly swelled up with pain.  It is since improved.  Generalized in location described as deep.  Currently no pain however was rated as moderate to severe.  Worse with weightbearing better with rest although had pain at rest.        Patient's past medical, surgical, social and family histories reviewed.     Past Medical History:   Diagnosis Date     Chronic ischemic heart disease, unspecified     Coronary artery dis     Depressive disorder, not elsewhere classified      Unspecified essential hypertension      Unspecified hyperplasia of prostate      Weakness generalized 2/21/2017       Past Surgical History:   Procedure Laterality Date     C ANESTH,DX ARTHROSCOPIC PROC KNEE JOINT  12/12/05    Left knee, with partial meniscectomy.     HC REMOVE TONSILS/ADENOIDS,<13 Y/O      T & A <12y.o.     HC UGI ENDOSCOPY DIAG W BIOPSY  08/25/09       Medications:    Current Outpatient Prescriptions on File Prior to Visit:  aspirin 81 MG tablet Take 1 tablet by mouth daily.   atorvastatin (LIPITOR) 20 MG tablet TAKE ONE TABLET BY MOUTH ONCE DAILY   buPROPion (WELLBUTRIN SR) 150 MG 12 hr tablet TAKE ONE TABLET BY MOUTH TWICE A DAY   erythromycin (ROMYCIN) ophthalmic ointment Place 0.25 inches into the right eye 4 times daily O.25 inches into " "eye(s) 4 times daily (Patient not taking: Reported on 4/17/2018)   finasteride (PROSCAR) 5 MG tablet TAKE ONE TABLET BY MOUTH ONCE DAILY   HYDROcodone-acetaminophen (NORCO) 5-325 MG per tablet Take 1 tablet by mouth every 8 hours as needed for moderate to severe pain   ibuprofen (ADVIL,MOTRIN) 600 MG tablet Take 1 tablet (600 mg) by mouth every 6 hours as needed for other (mild pain)   nitroglycerin (NITROSTAT) 0.4 MG SL tablet Place 1 tablet (0.4 mg) under the tongue every 5 minutes as needed for chest pain If you are still having symptoms after 3 doses (15 minutes) call 911. (Patient not taking: Reported on 4/17/2018)   omeprazole (PRILOSEC) 20 MG CR capsule TAKE ONE CAPSULE BY MOUTH ONCE DAILY   ORDER FOR DME 1 Device Respironics System One Auto CPAP @ 8 cm .    oxybutynin (DITROPAN) 5 MG tablet TAKE ONE TABLET BY MOUTH TWICE A DAY   ranitidine (ZANTAC) 300 MG tablet TAKE ONE TABLET BY MOUTH AT BEDTIME   sertraline (ZOLOFT) 100 MG tablet Take 1 tablet (100 mg) by mouth daily   tamsulosin (FLOMAX) 0.4 MG capsule Take 1 capsule (0.4 mg) by mouth daily   terazosin (HYTRIN) 5 MG capsule TAKE ONE CAPSULE BY MOUTH AT BEDTIME   VITAMIN E 400 IU OR CAPS AS DIRECTED     No current facility-administered medications on file prior to visit.     Allergies   Allergen Reactions     No Known Allergies        Social History     Occupational History     Retired Retired     Social History Main Topics     Smoking status: Former Smoker     Packs/day: 2.00     Years: 25.00     Types: Cigarettes     Quit date: 1/1/1970     Smokeless tobacco: Never Used     Alcohol use No      Comment: rarely     Drug use: No     Sexual activity: Yes     Partners: Female       Family History   Problem Relation Age of Onset     DIABETES Maternal Grandmother        REVIEW OF SYSTEMS  10 point review systems performed otherwise negative as noted as per history of present illness.    Physical Exam:  Vitals: /60  Ht 5' 11\" (1.803 m)  Wt 218 lb (98.9 " kg)  BMI 30.4 kg/m2  BMI= Body mass index is 30.4 kg/(m^2).  Constitutional: healthy, alert and no acute distress   Psychiatric: mentation appears normal and affect normal/bright  NEURO: no focal deficits  RESP: Normal with easy respirations and no use of accessory muscles to breathe, no audible wheezing or retractions  CV: bilateral lower extremity:   no edema         Regular rate and rhythm by palpation  SKIN: No erythema, rashes, excoriation, or breakdown. No evidence of infection.   JOINT/EXTREMITIES:bilateral knee: 0-120  active motion.  Right knee has varus deformity with no effusions.  No significant pain with palpation along the knee or bone.  No gross instability although some pseudolaxity medially in the right knee with valgus testing.  Extensor mechanism intact.     GAIT: not tested     Diagnostic Modalities:  bilateral knee X-ray: No fractures or dislocations.  Right knee demonstrates bone-on-bone arthritis medial compartment with near bone-on-bone arthritis left knee medial compartment.  Moderate narrowing bilateral patellofemoral.  Independent visualization of the images was performed.      Impression: bilateral knee primary osteoarthritis    Plan:  All of the above pertinent physical exam and imaging modalities findings was reviewed with Dean and his wife.    Options discussed.  Currently does not have any pain.  I discussed treatment including Tylenol rest activity modification and steroid injections.  I recommended he take some Tylenol if his knees are minimally bothersome over the next few days.  He reports that he has a trip he would like to take his grandson onto [place].  If he is having pain leading up to that I recommended he see me about 2 weeks beforehand and will proceed with intra-articular steroid injections.        Return to clinic PRN, or sooner as needed for changes.  Re-x-ray on return: Ami Villareal D.O.    Again, thank you for allowing me to participate in the care of your  patient.        Sincerely,        Hernandez Villareal, DO

## 2018-05-16 NOTE — PROGRESS NOTES
"ORTHOPEDIC CONSULT      Chief Complaint: Dean Mccarty is a 89 year old male who is being seen for Chief Complaint   Patient presents with     Consult     b/l knee pain        History of Present Illness:   Presents with his wife.  Right greater than left bilateral knee pain going on for \"a long time.  No specific traumas or injuries.  Pain would wax and wane.  Traditionally the right is worse than left.  Few days ago the right knee significantly swelled up with pain.  It is since improved.  Generalized in location described as deep.  Currently no pain however was rated as moderate to severe.  Worse with weightbearing better with rest although had pain at rest.        Patient's past medical, surgical, social and family histories reviewed.     Past Medical History:   Diagnosis Date     Chronic ischemic heart disease, unspecified     Coronary artery dis     Depressive disorder, not elsewhere classified      Unspecified essential hypertension      Unspecified hyperplasia of prostate      Weakness generalized 2/21/2017       Past Surgical History:   Procedure Laterality Date     C ANESTH,DX ARTHROSCOPIC PROC KNEE JOINT  12/12/05    Left knee, with partial meniscectomy.     HC REMOVE TONSILS/ADENOIDS,<11 Y/O      T & A <12y.o.     HC UGI ENDOSCOPY DIAG W BIOPSY  08/25/09       Medications:    Current Outpatient Prescriptions on File Prior to Visit:  aspirin 81 MG tablet Take 1 tablet by mouth daily.   atorvastatin (LIPITOR) 20 MG tablet TAKE ONE TABLET BY MOUTH ONCE DAILY   buPROPion (WELLBUTRIN SR) 150 MG 12 hr tablet TAKE ONE TABLET BY MOUTH TWICE A DAY   erythromycin (ROMYCIN) ophthalmic ointment Place 0.25 inches into the right eye 4 times daily O.25 inches into eye(s) 4 times daily (Patient not taking: Reported on 4/17/2018)   finasteride (PROSCAR) 5 MG tablet TAKE ONE TABLET BY MOUTH ONCE DAILY   HYDROcodone-acetaminophen (NORCO) 5-325 MG per tablet Take 1 tablet by mouth every 8 hours as needed for moderate to " "severe pain   ibuprofen (ADVIL,MOTRIN) 600 MG tablet Take 1 tablet (600 mg) by mouth every 6 hours as needed for other (mild pain)   nitroglycerin (NITROSTAT) 0.4 MG SL tablet Place 1 tablet (0.4 mg) under the tongue every 5 minutes as needed for chest pain If you are still having symptoms after 3 doses (15 minutes) call 911. (Patient not taking: Reported on 4/17/2018)   omeprazole (PRILOSEC) 20 MG CR capsule TAKE ONE CAPSULE BY MOUTH ONCE DAILY   ORDER FOR DME 1 Device Respironics System One Auto CPAP @ 8 cm .    oxybutynin (DITROPAN) 5 MG tablet TAKE ONE TABLET BY MOUTH TWICE A DAY   ranitidine (ZANTAC) 300 MG tablet TAKE ONE TABLET BY MOUTH AT BEDTIME   sertraline (ZOLOFT) 100 MG tablet Take 1 tablet (100 mg) by mouth daily   tamsulosin (FLOMAX) 0.4 MG capsule Take 1 capsule (0.4 mg) by mouth daily   terazosin (HYTRIN) 5 MG capsule TAKE ONE CAPSULE BY MOUTH AT BEDTIME   VITAMIN E 400 IU OR CAPS AS DIRECTED     No current facility-administered medications on file prior to visit.     Allergies   Allergen Reactions     No Known Allergies        Social History     Occupational History     Retired Retired     Social History Main Topics     Smoking status: Former Smoker     Packs/day: 2.00     Years: 25.00     Types: Cigarettes     Quit date: 1/1/1970     Smokeless tobacco: Never Used     Alcohol use No      Comment: rarely     Drug use: No     Sexual activity: Yes     Partners: Female       Family History   Problem Relation Age of Onset     DIABETES Maternal Grandmother        REVIEW OF SYSTEMS  10 point review systems performed otherwise negative as noted as per history of present illness.    Physical Exam:  Vitals: /60  Ht 5' 11\" (1.803 m)  Wt 218 lb (98.9 kg)  BMI 30.4 kg/m2  BMI= Body mass index is 30.4 kg/(m^2).  Constitutional: healthy, alert and no acute distress   Psychiatric: mentation appears normal and affect normal/bright  NEURO: no focal deficits  RESP: Normal with easy respirations and no use of " accessory muscles to breathe, no audible wheezing or retractions  CV: bilateral lower extremity:   no edema         Regular rate and rhythm by palpation  SKIN: No erythema, rashes, excoriation, or breakdown. No evidence of infection.   JOINT/EXTREMITIES:bilateral knee: 0-120  active motion.  Right knee has varus deformity with no effusions.  No significant pain with palpation along the knee or bone.  No gross instability although some pseudolaxity medially in the right knee with valgus testing.  Extensor mechanism intact.     GAIT: not tested     Diagnostic Modalities:  bilateral knee X-ray: No fractures or dislocations.  Right knee demonstrates bone-on-bone arthritis medial compartment with near bone-on-bone arthritis left knee medial compartment.  Moderate narrowing bilateral patellofemoral.  Independent visualization of the images was performed.      Impression: bilateral knee primary osteoarthritis    Plan:  All of the above pertinent physical exam and imaging modalities findings was reviewed with Dean and his wife.    Options discussed.  Currently does not have any pain.  I discussed treatment including Tylenol rest activity modification and steroid injections.  I recommended he take some Tylenol if his knees are minimally bothersome over the next few days.  He reports that he has a trip he would like to take his grandson onto [place].  If he is having pain leading up to that I recommended he see me about 2 weeks beforehand and will proceed with intra-articular steroid injections.        Return to clinic PRN, or sooner as needed for changes.  Re-x-ray on return: No    Jose Alejandro Villareal D.O.

## 2018-05-21 NOTE — TELEPHONE ENCOUNTER
Prescription approved per [facility] Refill Protocol.    Shonna Rodriguez RN  [facility]

## 2018-05-21 NOTE — TELEPHONE ENCOUNTER
"Requested Prescriptions   Pending Prescriptions Disp Refills     finasteride (PROSCAR) 5 MG tablet [Pharmacy Med Name: FINASTERIDE 5MG TABS] 30 tablet 0     Sig: TAKE ONE TABLET BY MOUTH ONCE DAILY    Alpha Blockers Passed    5/19/2018  1:01 AM       Passed - Blood pressure under 140/90 in past 12 months    BP Readings from Last 3 Encounters:   05/16/18 120/60   04/17/18 138/68   08/11/17 138/74                Passed - Recent (12 mo) or future (30 days) visit within the authorizing provider's specialty    Patient had office visit in the last 12 months or has a visit in the next 30 days with authorizing provider or within the authorizing provider's specialty.  See \"Patient Info\" tab in inbasket, or \"Choose Columns\" in Meds & Orders section of the refill encounter.           Passed - Patient does not have Tadalafil, Vardenafil, or Sildenafil on their medication list       Passed - Patient is 18 years of age or older        oxybutynin (DITROPAN) 5 MG tablet [Pharmacy Med Name: OXYBUTYNIN CHLORIDE 5MG TABS] 60 tablet 0     Sig: TAKE ONE TABLET BY MOUTH TWO TIMES A DAY    Muscarinic Antagonists (Urinary Incontinence Agents) Passed    5/19/2018  1:01 AM       Passed - Recent (12 mo) or future (30 days) visit within the authorizing provider's specialty    Patient had office visit in the last 12 months or has a visit in the next 30 days with authorizing provider or within the authorizing provider's specialty.  See \"Patient Info\" tab in inbasket, or \"Choose Columns\" in Meds & Orders section of the refill encounter.           Passed - Medication is Oxybutynin and patient is 5 years of age or older       Passed - Patient does not have a diagnosis of glaucoma on the problem list    If glaucoma diagnosis is new, refer refill to physician.         Passed - Patient is 18 years of age or older        "

## 2018-06-06 NOTE — TELEPHONE ENCOUNTER
Pres[place] per [facility] [place].    Galilea Casey RN

## 2018-06-06 NOTE — TELEPHONE ENCOUNTER
"Requested Prescriptions   Pending Prescriptions Disp Refills     ranitidine (ZANTAC) 300 MG tablet [Pharmacy Med Name: RANITIDINE HCL 300MG TABS] 90 tablet 0     Sig: TAKE ONE TABLET BY MOUTH AT BEDTIME    H2 Blockers Protocol Passed    6/6/2018 10:21 AM       Passed - Patient is age 12 or older       Passed - Recent (12 mo) or future (30 days) visit within the authorizing provider's specialty    Patient had office visit in the last 12 months or has a visit in the next 30 days with authorizing provider or within the authorizing provider's specialty.  See \"Patient Info\" tab in inbasket, or \"Choose Columns\" in Meds & Orders section of the refill encounter.              Last Written Prescription Date:  9-18-17  Last Fill Quantity: 90,  # refills: 1   Last Office Visit with [facility], P or [facility] prescribing provider:  4/17/18   Future Office Visit:       "

## 2018-06-22 NOTE — TELEPHONE ENCOUNTER
Reason for Call:  Same Day Appointment, Requested Provider:  Leobardo Yoo DO    PCP: Leobardo Yoo    Reason for visit: pt would like to be worked in sometime during the week of 6/25     Duration of symptoms: ongoing    Have you been treated for this in the past? No    Additional comments: pt stated dizziness has been going on for a few weeks(stated not urgent) would like to be worked in if possible. Please call and advise    Can we leave a detailed message on this number? YES    Phone number patient can be reached at: Home number on file [phone] (home)    Best Time: any    Call taken on 6/22/2018 at 4:39 PM by Katherine Pendleton

## 2018-06-26 NOTE — PROGRESS NOTES
SUBJECTIVE:   Dean Mccarty is a 89 year old male who presents to clinic today for the following health issues:    Dizziness  Onset: x 2 weeks    Description:   Do you feel faint:  no   Does it feel like the surroundings (bed, room) are moving: no   Unsteady/off balance: YES  Have you passed out or fallen: no     Intensity: moderate    Progression of Symptoms:  worsening and intermittent    Accompanying Signs & Symptoms:  Heart palpitations: no - Pressure  Nausea, vomiting: no   Weakness in arms or legs: no   Fatigue: YES  Vision or speech changes: no   Ringing in ears (Tinnitus): no   Hearing Loss: YES    History:   Head trauma/concussion hx: no   Previous similar symptoms: YES  Recent bleeding history: no     Precipitating factors:   Worse with activity or head movement: YES  Any new medications (BP?): no   Alcohol/drug abuse/withdrawal: no     Alleviating factors:   Does staying in a fixed position give relief:  YES    Therapies Tried and outcome: none                   Chief Complaint    The patient is a pleasant 89-year-old gentleman who appears substantially younger than his stated age.  He has recently been aggressively for this, as, and fatigue with exertion.  We have reviewed his medication is noted that he has 2 blockers.  His blood pressure and prostate.  We will discontinue the Hytrin as his blood pressure is 112/69.  He also notes that he is to have some chest discomfort.  He plays it down but his wife notes that it is becoming more more problems.  He describes it as a pressure of his chest.  He does have a history of coronary artery disease and has had a previous angioplasty several years ago.  He had exercise echo about a year ago and this did not demonstrate any significant pathology.  He states his symptoms are substantially worse.                       PAST, FAMILY,SOCIAL HISTORY:     Medical  History:   has a past medical history of Chronic ischemic heart disease,  unspecified; Depressive disorder, not elsewhere classified; Unspecified essential hypertension; Unspecified hyperplasia of prostate; and Weakness generalized (2/21/2017).     Surgical History:   has a past surgical history that includes REMOVE TONSILS/ADENOIDS,<13 Y/O; ANESTH,DX ARTHROSCOPIC PROC KNEE JOINT (12/12/05); and UGI ENDOSCOPY DIAG W BIOPSY (08/25/09).     Social History:   reports that he quit smoking about 48 years ago. His smoking use included Cigarettes. He has a 50.00 pack-year smoking history. He has never used smokeless tobacco. He reports that he does not drink alcohol or use illicit drugs.     Family History:  family history includes Diabetes in his maternal grandmother.            MEDICATIONS  Current Outpatient Prescriptions   Medication Sig Dispense Refill     aspirin 81 MG tablet Take 1 tablet by mouth daily.       atorvastatin (LIPITOR) 20 MG tablet TAKE ONE TABLET BY MOUTH ONCE DAILY 90 tablet 1     buPROPion (WELLBUTRIN SR) 150 MG 12 hr tablet TAKE ONE TABLET BY MOUTH TWICE A DAY 60 tablet 6     finasteride (PROSCAR) 5 MG tablet TAKE ONE TABLET BY MOUTH ONCE DAILY 30 tablet 5     ibuprofen (ADVIL,MOTRIN) 600 MG tablet Take 1 tablet (600 mg) by mouth every 6 hours as needed for other (mild pain) 40 tablet 0     loratadine (CLARITIN) 10 MG tablet Take 1 tablet by mouth daily  6     omeprazole (PRILOSEC) 20 MG CR capsule TAKE ONE CAPSULE BY MOUTH ONCE DAILY 30 capsule 5     oxybutynin (DITROPAN) 5 MG tablet TAKE ONE TABLET BY MOUTH TWO TIMES A DAY 60 tablet 5     ranitidine (ZANTAC) 300 MG tablet TAKE ONE TABLET BY MOUTH AT BEDTIME 90 tablet 1     tamsulosin (FLOMAX) 0.4 MG capsule Take 1 capsule (0.4 mg) by mouth daily 30 capsule 5     erythromycin (ROMYCIN) ophthalmic ointment Place 0.25 inches into the right eye 4 times daily O.25 inches into eye(s) 4 times daily (Patient not taking: Reported on 4/17/2018) 1 Tube 0     HYDROcodone-acetaminophen (NORCO) 5-325 MG per tablet Take 1 tablet by  mouth every 8 hours as needed for moderate to severe pain (Patient not taking: Reported on 6/26/2018) 42 tablet 0     nitroglycerin (NITROSTAT) 0.4 MG SL tablet Place 1 tablet (0.4 mg) under the tongue every 5 minutes as needed for chest pain If you are still having symptoms after 3 doses (15 minutes) call 911. (Patient not taking: Reported on 4/17/2018) 25 tablet 3     ORDER FOR DME 1 Device Respironics System One Auto CPAP @ 8 cm .        sertraline (ZOLOFT) 100 MG tablet Take 1 tablet (100 mg) by mouth daily 90 tablet 0     VITAMIN E 400 IU OR CAPS AS DIRECTED QS 0         --------------------------------------------------------------------------------------------------------------------                              Review of Systems     LUNGS: Pt denies: cough,excess sputum, hemoptysis, or shortness of breath at rest.  He does have some dyspnea with exertion..   HEART: Pt denies: Current chest pain, arrythmia, syncope, tachy or bradyarrhythmia.  Has had no chest discomfort with exertion with all pressure sensation in the middle of his chest which does resolve with rest.   GI: Pt denies: nausea, vomitting, diarrhea, constipation, melena, or hematochezia.   NEURO: Pt denies: seizures, strokes, diplopia,  paraesthesias, or paralysis.  Patient has been having some progressive weakness and lightheadedness.                                   Examination      /60  Pulse 86  Temp 96.9  F (36.1  C) (Temporal)  Resp 16  Wt 218 lb 6.4 oz (99.1 kg)  SpO2 94%  BMI 30.46 kg/m2   Constitutional: The patient appears to be in no acute distress. The patient appears to be adequately hydrated. No acute respiratory or hemodynamic distress is noted at this time.   LUNGS: clear bilaterally, airflow is brisk, no intercostal retraction or stridor is noted. No coughing is noted during visit.   HEART:  regular without rubs, clicks, gallops, or murmurs. PMI is nondisplaced. Upstrokes are brisk. S1,S2 are heard.   GI: Abdomen is  soft, without rebound, guarding or tenderness. Bowel sounds are appropriate. No renal bruits are heard.   NEURO: Pt is alert and appropriate. No neurologic lateralization is noted. Cranial nerves 2-12 are intact. Peripheral sensory and motor function are grossly normal.                                           Decision Making    1. Unstable angina pectoris (H)  Check nuclear stress test, continue current medications for now  - NM Exercise stress test; Future  - Troponin I  - EKG 12-lead complete w/read - Clinics    2. Weakness generalized  Discontinue Hytrin and Zantac  Patient is on oxybutynin with good results.  Despite the fact that this can cause fatigue, he does not wish to discontinue it.  3. Hypotension, unspecified hypotension type    - Basic metabolic panel  - CBC with platelets    4. Gastroesophageal reflux disease without esophagitis  Continue proton pump inhibitor                            FOLLOW UP   I have asked the patient to make an appointment for followup with me following the stress test        I have carefully explained the diagnosis and treatment options to the patient.  The patient has displayed an understanding of the above, and all subsequent questions were answered.

## 2018-06-26 NOTE — MR AVS SNAPSHOT
After Visit Summary   6/26/2018    Dean Mccarty    MRN: [id number]           Patient Information     Date Of Birth          7/6/1928        Visit Information        Provider Department      6/26/2018 9:20 AM Leobardo Yoo DO [facility]        Today's Diagnoses     Weakness generalized    -  1    Unstable angina pectoris (H)        Hypotension, unspecified hypotension type        Gastroesophageal reflux disease without esophagitis           Follow-ups after your visit        Your next 10 appointments already scheduled     Jun 28, 2018  7:30 AM CDT   NM MPI TREADMILL with PHNM1, NL STRESS TEST, PMC RM1   [facility] ([facility])    [address] [place] [id number] [phone]           For a ONE day exam: Allow 3-4 hours for test. For a TWO day exam: Allow  minutes PER day for test.  On the day of your resting scan: Please stop eating 3 hours before the test. You may drink water or juice.  On the day of your stress test: Stop all caffeine 12 hours before the test. This includes coffee, tea, soda pop, chocolate and certain medicines (such as Anacin, Excedrin and NoDoz). Also avoid decaf coffee and tea, as these contain small amounts of caffeine.  Stop eating 3 hours before the test. You may drink water.  You may need to stop some medicines before the test. Follow your doctor s orders. - If you take a beta blocker: Do not take your beta-blocker on the day before your test, unless specifically told to by your doctor. And do not take it on the day of your test. Bring it with you to take after the test. - If you take Aggrenox or dipyridamole (Persantine, Permole), stop taking it 48 hours before your test. - If you take Viagra, Cialis or Levitra, stop taking it 48 hours before your test. - If you take theophylline or aminophylline, stop taking it 12 hours before your test.  Do not take nitrates on the day of your  test. Do not wear your Nitro-Patch.  Please wear a loose two-piece outfit. If you will have an exercise test, bring rubber-soled walking shoes.  When you arrive, please tell us if you have a pacemaker or ICD (implantable defibrillator).  Please call your Imaging Department at your exam site with any questions.              Future tests that were ordered for you today     Open Future Orders        Priority Expected Expires Ordered    NM Exercise stress test Routine  6/26/2019 6/26/2018            Who to contact     If you have questions or need follow up information about today's clinic visit or your schedule please contact [facility] directly at [phone].  Normal or non-critical lab and imaging results will be communicated to you by MyChart, letter or phone within 4 business days after the clinic has received the results. If you do not hear from us within 7 days, please contact the clinic through MyChart or phone. If you have a critical or abnormal lab result, we will notify you by phone as soon as possible.  Submit refill requests through [employer] or call your pharmacy and they will forward the refill request to us. Please allow 3 business days for your refill to be completed.          Additional Information About Your Visit        Care EveryWhere ID     This is your Care EveryWhere ID. This could be used by other organizations to access your [place] medical records  [id number]        Your Vitals Were     Pulse Temperature Respirations Pulse Oximetry BMI (Body Mass Index)       86 96.9  F (36.1  C) (Temporal) 16 94% 30.46 kg/m2        Blood Pressure from Last 3 Encounters:   06/26/18 112/60   05/16/18 120/60   04/17/18 138/68    Weight from Last 3 Encounters:   06/26/18 218 lb 6.4 oz (99.1 kg)   05/16/18 218 lb (98.9 kg)   04/17/18 218 lb (98.9 kg)              We Performed the Following     Basic metabolic panel     CBC with platelets     EKG 12-lead complete w/read - Clinics     Troponin I           Today's Medication Changes          These changes are accurate as of 6/26/18 10:16 AM.  If you have any questions, ask your nurse or doctor.               Stop taking these medicines if you haven't already. Please contact your care team if you have questions.     terazosin 5 MG capsule   Commonly known as:  HYTRIN   Stopped by:  Leobardo Yoo DO                    Primary Care Provider Office Phone # Fax #    Leobardo Yoo [phone]       [address] [place] [address]        Equal Access to Services     [facility]: Hadii aad ku hadasho Soomaali, waaxda luqadaha, qaybta kaalmada adeegyada, waxay josein hayevitan leopoldo segura . So [facility] [phone].    ATENCIÓN: Si habla español, tiene a machado disposición servicios gratuitos de asistencia lingüística. [facility] [phone].    We comply with applicable federal civil rights laws and [place] laws. We do not discriminate on the basis of race, color, national origin, age, disability, sex, sexual orientation, or gender identity.            Thank you!     Thank you for choosing [facility]  for your care. Our goal is always to provide you with excellent care. Hearing back from our patients is one way we can continue to improve our services. Please take a few minutes to complete the written survey that you may receive in the mail after your visit with us. Thank you!             Your Updated Medication List - Protect others around you: Learn how to safely use, store and throw away your medicines at www.disposemymeds.org.          This list is accurate as of 6/26/18 10:16 AM.  Always use your most recent med list.                   Brand Name Dispense Instructions for use Diagnosis    aspirin 81 MG tablet      Take 1 tablet by mouth daily.        atorvastatin 20 MG tablet    LIPITOR    90 tablet    TAKE ONE TABLET BY MOUTH ONCE DAILY    Hyperlipidemia LDL goal <100, Chronic ischemic heart disease        buPROPion 150 MG 12 hr tablet    WELLBUTRIN SR    60 tablet    TAKE ONE TABLET BY MOUTH TWICE A DAY    Major depressive disorder, recurrent episode, mild (H)       erythromycin ophthalmic ointment    ROMYCIN    1 Tube    Place 0.25 inches into the right eye 4 times daily O.25 inches into eye(s) 4 times daily    Blepharitis of both upper and lower eyelid of right eye, unspecified type       finasteride 5 MG tablet    PROSCAR    30 tablet    TAKE ONE TABLET BY MOUTH ONCE DAILY    Hyperplasia of prostate       HYDROcodone-acetaminophen 5-325 MG per tablet    NORCO    42 tablet    Take 1 tablet by mouth every 8 hours as needed for moderate to severe pain    Compression fracture       ibuprofen 600 MG tablet    ADVIL/MOTRIN    40 tablet    Take 1 tablet (600 mg) by mouth every 6 hours as needed for other (mild pain)    Acute pharyngitis due to other specified organisms       loratadine 10 MG tablet    CLARITIN     Take 1 tablet by mouth daily        nitroGLYcerin 0.4 MG sublingual tablet    NITROSTAT    25 tablet    Place 1 tablet (0.4 mg) under the tongue every 5 minutes as needed for chest pain If you are still having symptoms after 3 doses (15 minutes) call 911.    Chronic ischemic heart disease, unspecified       omeprazole 20 MG CR capsule    priLOSEC    30 capsule    TAKE ONE CAPSULE BY MOUTH ONCE DAILY    Gastroesophageal reflux disease without esophagitis       order for DME      1 Device Respironics System One Auto CPAP @ 8 cm .        oxybutynin 5 MG tablet    DITROPAN    60 tablet    TAKE ONE TABLET BY MOUTH TWO TIMES A DAY    Hyperplasia of prostate       ranitidine 300 MG tablet    ZANTAC    90 tablet    TAKE ONE TABLET BY MOUTH AT BEDTIME    Gastroesophageal reflux disease without esophagitis       sertraline 100 MG tablet    ZOLOFT    90 tablet    Take 1 tablet (100 mg) by mouth daily    Major depressive disorder, recurrent episode, mild (H)       tamsulosin 0.4 MG capsule    FLOMAX    30 capsule     Take 1 capsule (0.4 mg) by mouth daily    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 tract symptoms       vitamin E 400 UNIT capsule     QS    AS DIRECTED

## 2018-06-26 NOTE — LETTER
June 27, 2018      Dean Mccarty  [address]  [facility] [address]        Dear ,    We are writing to inform you of your test results.    The chemistry panel is now normal.  Blood sugar and electrolytes are within normal limits.  The blood cell count is normal.  The mild anemia that the patient had has resolved.     Resulted Orders   Basic metabolic panel   Result Value Ref Range    Sodium 141 133 - 144 mmol/L    Potassium 4.1 3.4 - 5.3 mmol/L    Chloride 109 94 - 109 mmol/L    Carbon Dioxide 25 20 - 32 mmol/L    Anion Gap 7 3 - 14 mmol/L    Glucose 90 70 - 99 mg/dL    Urea Nitrogen 13 7 - 30 mg/dL    Creatinine 1.06 0.66 - 1.25 mg/dL    GFR Estimate 66 >60 mL/min/1.7m2      Comment:      Non  GFR Calc    GFR Estimate If Black 79 >60 mL/min/1.7m2      Comment:       GFR Calc    Calcium 8.7 8.5 - 10.1 mg/dL   CBC with platelets   Result Value Ref Range    WBC 7.6 4.0 - 11.0 10e9/L    RBC Count 4.95 4.4 - 5.9 10e12/L    Hemoglobin 14.2 13.3 - 17.7 g/dL    Hematocrit 42.7 40.0 - 53.0 %    MCV 86 78 - 100 fl    MCH 28.7 26.5 - 33.0 pg    MCHC 33.3 31.5 - 36.5 g/dL    RDW 13.4 10.0 - 15.0 %    Platelet Count 240 150 - 450 10e9/L   Troponin I   Result Value Ref Range    Troponin I ES <0.015 0.000 - 0.045 ug/L      Comment:      The 99th percentile for upper reference range is 0.045 ug/L.  Troponin values   in the range of 0.045 - 0.120 ug/L may be associated with risks of adverse   clinical events.         If you have any questions or concerns, please call the clinic at the number listed above.       Sincerely,        Leobardo Yoo, DO

## 2018-06-27 NOTE — PROGRESS NOTES
Please contact the patient and notify him of the following:  The chemistry panel is now normal.  Blood sugar and electrolytes are within normal limits.  The blood cell count is normal.  The mild anemia that the patient had has resolved.    Thank you.   DO MUKESH Resendiz

## 2018-07-18 NOTE — PROGRESS NOTES
Please contact the patient and notify him of the following:  Nuclear stress test demonstrates a small area of reversible ischemia.  The ejection fraction is 57%.  Compared to previous study from 8 years ago, this new area is new.    Given the patient's very young demeanor, I would recommend cardiology consultation.    Please help him set this up if he wishes to pursue it.    Thank you.   DO MUKESH Resendiz

## 2018-07-18 NOTE — TELEPHONE ENCOUNTER
----- Message from Leobardo Yoo DO sent at 7/17/2018  8:30 PM CDT -----  Please contact the patient and notify him of the following:  Nuclear stress test demonstrates a small area of reversible ischemia.  The ejection fraction is 57%.  Compared to previous study from 8 years ago, this new area is new.    Given the patient's very young demeanor, I would recommend cardiology consultation.    Please help him set this up if he wishes to pursue it.    Thank you.   Leobardo Yoo DO FACOI

## 2018-08-08 NOTE — LETTER
8/8/2018      Leobardo Yoo, DO  [address] [place] [address]      RE: Dean Guillenson       Dear Colleague,    I had the pleasure of seeing Dean Mccarty in the [facility].    Service Date: 08/08/2018      REASON FOR CLINIC VISIT:  Abnormal stress test.      HISTORY OF PRESENT ILLNESS:  Mr. Mccarty is a very pleasant 89-year-old gentleman with history of hypertension, dyslipidemia and depression who I am seeing in the Cardiology Clinic for the first time because of an abnormal stress test.  The patient had a Lexiscan stress test done about 1-1/2 months ago.  This showed a small reversible mid and distal inferior defect overall suggestive of small territory of inferior ischemia.  LV function was normal at 57%.        The patient tells me remarkably he does not feel any particular symptoms like chest discomfort or shortness of breath.  His main issue is that over the last several months he has noticed decline in his energy level and he needs to take a short nap in the afternoon and once he takes a nap, he is full of energy again.  He walks at least 30 minutes with a stretch.  He also climbs stairs.  No chest discomfort or shortness of breath at rest or with physical activity.  No chest tightness or jaundice discomfort or arm discomfort with exertion.        He is on baby aspirin 81 mg daily and Lipitor 20 mg daily.        The patient tells me that he has had a good life so far and he is happy with his health right now, has no complaints.  All of his friends have passed away.      PHYSICAL EXAMINATION:   VITAL SIGNS:  Blood pressure 110/72, heart rate 86 and regular, weight 217 pounds, BMI 30.37.   GENERAL:  The patient appears pleasant, comfortable.   NECK:  Normal JVP, no bruit.   CARDIOVASCULAR SYSTEM:  S1, S2 normal, no murmur, rub or gallop.   RESPIRATORY SYSTEM:  Clear to auscultation bilaterally.   GASTROINTESTINAL SYSTEM:  Abdomen soft, nontender.    EXTREMITIES:  No pitting pedal edema.   NEUROLOGICAL:  Alert, oriented x3.   PSYCHIATRIC:  Normal affect.   SKIN:  No obvious rash.   HEENT:  No pallor or icterus.      IMPRESSION AND PLAN:  A pleasant 89-year-old gentleman with newly diagnosed CAD on the basis of abnormal stress test showing mild basal and mid inferior ischemia with normal LV function.  Other comorbidities of hypertension, dyslipidemia on aspirin and statin.        I had a long discussion with the patient regarding the findings of the stress test.  We discussed *** and *** of the stress test.  Remarkably, he does not appear to have any anginal symptoms at this time.  I repeatedly asked the patient about any exertional-related symptoms, like chest discomfort, tightness or shortness of breath and he emphatically declined them.  We discussed option of continuing medical management versus coronary angiogram.  We discussed risks risk/benefit of each option.  At this time, given the asymptomatic status only mild level of ischemia and the patient preference, we would continue medical management with aspirin and statin.  Given his age, I think moderate intensity statin is reasonable.  Given the fact that he feels low in energy and fatigue, I am not adding beta blocker at this time.  Also, the fact that he has no symptoms, I am not adding any long-acting nitrates.        At this time, I recommend continuing medical management.  Follow up in 6 months, sooner if he notes any change in clinical symptoms, especially if any exertional-related symptoms.         DERECK DELGADO MD             D: 2018   T: 2018   MT: ALBERTA      Name:     EVA ADAMS   MRN:      [id number]        Account:      [id number]   :      1928           Service Date: 2018      Document: [id number]         Outpatient Encounter Prescriptions as of 2018   Medication Sig Dispense Refill     aspirin 81 MG tablet Take 1 tablet by mouth daily.       atorvastatin  (LIPITOR) 20 MG tablet TAKE ONE TABLET BY MOUTH ONCE DAILY 90 tablet 1     buPROPion (WELLBUTRIN SR) 150 MG 12 hr tablet TAKE ONE TABLET BY MOUTH TWICE A DAY 60 tablet 6     finasteride (PROSCAR) 5 MG tablet TAKE ONE TABLET BY MOUTH ONCE DAILY 30 tablet 5     ibuprofen (ADVIL,MOTRIN) 600 MG tablet Take 1 tablet (600 mg) by mouth every 6 hours as needed for other (mild pain) 40 tablet 0     loratadine (CLARITIN) 10 MG tablet Take 1 tablet by mouth daily  6     omeprazole (PRILOSEC) 20 MG CR capsule TAKE ONE CAPSULE BY MOUTH ONCE DAILY 30 capsule 5     oxybutynin (DITROPAN) 5 MG tablet TAKE ONE TABLET BY MOUTH TWO TIMES A DAY 60 tablet 5     ranitidine (ZANTAC) 300 MG tablet TAKE ONE TABLET BY MOUTH AT BEDTIME 90 tablet 1     sertraline (ZOLOFT) 100 MG tablet Take 1 tablet (100 mg) by mouth daily 90 tablet 0     tamsulosin (FLOMAX) 0.4 MG capsule Take 1 capsule (0.4 mg) by mouth daily 30 capsule 5     VITAMIN E 400 IU OR CAPS AS DIRECTED QS 0     erythromycin (ROMYCIN) ophthalmic ointment Place 0.25 inches into the right eye 4 times daily O.25 inches into eye(s) 4 times daily (Patient not taking: Reported on 4/17/2018) 1 Tube 0     HYDROcodone-acetaminophen (NORCO) 5-325 MG per tablet Take 1 tablet by mouth every 8 hours as needed for moderate to severe pain (Patient not taking: Reported on 6/26/2018) 42 tablet 0     nitroglycerin (NITROSTAT) 0.4 MG SL tablet Place 1 tablet (0.4 mg) under the tongue every 5 minutes as needed for chest pain If you are still having symptoms after 3 doses (15 minutes) call 911. (Patient not taking: Reported on 4/17/2018) 25 tablet 3     ORDER FOR DME 1 Device Respironics System One Auto CPAP @ 8 cm .        No facility-administered encounter medications on file as of 8/8/2018.          Again, thank you for allowing me to participate in the care of your patient.      Sincerely,    Brown Stevens MD     [facility]

## 2018-08-08 NOTE — LETTER
8/8/2018      Leobardo Yoo, DO  [address] [place] [address]      RE: Dean Guillenson       Dear Colleague,    I had the pleasure of seeing Dean Mccarty in the [facility].    Service Date: 08/08/2018      REASON FOR CLINIC VISIT:  Abnormal stress test.      HISTORY OF PRESENT ILLNESS:  Mr. Mccarty is a very pleasant 89-year-old gentleman with history of hypertension, dyslipidemia and depression who I am seeing in the Cardiology Clinic for the first time because of an abnormal stress test.  The patient had a Lexiscan stress test done about 1-1/2 months ago.  This showed a small reversible mid and distal inferior defect overall suggestive of small territory of inferior ischemia.  LV function was normal at 57%.        The patient tells me remarkably he does not feel any particular symptoms like chest discomfort or shortness of breath.  His main issue is that over the last several months he has noticed decline in his energy level and he needs to take a short nap in the afternoon and once he takes a nap, he is full of energy again.  He walks at least 30 minutes with a stretch.  He also climbs stairs.  No chest discomfort or shortness of breath at rest or with physical activity.  No chest tightness or jaundice discomfort or arm discomfort with exertion.        He is on baby aspirin 81 mg daily and Lipitor 20 mg daily.        The patient tells me that he has had a good life so far and he is happy with his health right now, has no complaints.  All of his friends have passed away.      PHYSICAL EXAMINATION:   VITAL SIGNS:  Blood pressure 110/72, heart rate 86 and regular, weight 217 pounds, BMI 30.37.   GENERAL:  The patient appears pleasant, comfortable.   NECK:  Normal JVP, no bruit.   CARDIOVASCULAR SYSTEM:  S1, S2 normal, no murmur, rub or gallop.   RESPIRATORY SYSTEM:  Clear to auscultation bilaterally.   GASTROINTESTINAL SYSTEM:  Abdomen soft, nontender.    EXTREMITIES:  No pitting pedal edema.   NEUROLOGICAL:  Alert, oriented x3.   PSYCHIATRIC:  Normal affect.   SKIN:  No obvious rash.   HEENT:  No pallor or icterus.      IMPRESSION AND PLAN:  A pleasant 89-year-old gentleman with newly diagnosed CAD on the basis of abnormal stress test showing mild basal and mid inferior ischemia with normal LV function.  Other comorbidities of hypertension, dyslipidemia on aspirin and statin.        I had a long discussion with the patient regarding the findings of the stress test.  We discussed *** and *** of the stress test.  Remarkably, he does not appear to have any anginal symptoms at this time.  I repeatedly asked the patient about any exertional-related symptoms, like chest discomfort, tightness or shortness of breath and he emphatically declined them.  We discussed option of continuing medical management versus coronary angiogram.  We discussed risks risk/benefit of each option.  At this time, given the asymptomatic status only mild level of ischemia and the patient preference, we would continue medical management with aspirin and statin.  Given his age, I think moderate intensity statin is reasonable.  Given the fact that he feels low in energy and fatigue, I am not adding beta blocker at this time.  Also, the fact that he has no symptoms, I am not adding any long-acting nitrates.        At this time, I recommend continuing medical management.  Follow up in 6 months, sooner if he notes any change in clinical symptoms, especially if any exertional-related symptoms.         DERECK DELGADO MD             D: 2018   T: 2018   MT: ALBERTA      Name:     EVA ADAMS   MRN:      [id number]        Account:      [id number]   :      1928           Service Date: 2018      Document: [id number]         Outpatient Encounter Prescriptions as of 2018   Medication Sig Dispense Refill     aspirin 81 MG tablet Take 1 tablet by mouth daily.       atorvastatin  (LIPITOR) 20 MG tablet TAKE ONE TABLET BY MOUTH ONCE DAILY 90 tablet 1     buPROPion (WELLBUTRIN SR) 150 MG 12 hr tablet TAKE ONE TABLET BY MOUTH TWICE A DAY 60 tablet 6     finasteride (PROSCAR) 5 MG tablet TAKE ONE TABLET BY MOUTH ONCE DAILY 30 tablet 5     ibuprofen (ADVIL,MOTRIN) 600 MG tablet Take 1 tablet (600 mg) by mouth every 6 hours as needed for other (mild pain) 40 tablet 0     loratadine (CLARITIN) 10 MG tablet Take 1 tablet by mouth daily  6     omeprazole (PRILOSEC) 20 MG CR capsule TAKE ONE CAPSULE BY MOUTH ONCE DAILY 30 capsule 5     oxybutynin (DITROPAN) 5 MG tablet TAKE ONE TABLET BY MOUTH TWO TIMES A DAY 60 tablet 5     ranitidine (ZANTAC) 300 MG tablet TAKE ONE TABLET BY MOUTH AT BEDTIME 90 tablet 1     sertraline (ZOLOFT) 100 MG tablet Take 1 tablet (100 mg) by mouth daily 90 tablet 0     tamsulosin (FLOMAX) 0.4 MG capsule Take 1 capsule (0.4 mg) by mouth daily 30 capsule 5     VITAMIN E 400 IU OR CAPS AS DIRECTED QS 0     erythromycin (ROMYCIN) ophthalmic ointment Place 0.25 inches into the right eye 4 times daily O.25 inches into eye(s) 4 times daily (Patient not taking: Reported on 4/17/2018) 1 Tube 0     HYDROcodone-acetaminophen (NORCO) 5-325 MG per tablet Take 1 tablet by mouth every 8 hours as needed for moderate to severe pain (Patient not taking: Reported on 6/26/2018) 42 tablet 0     nitroglycerin (NITROSTAT) 0.4 MG SL tablet Place 1 tablet (0.4 mg) under the tongue every 5 minutes as needed for chest pain If you are still having symptoms after 3 doses (15 minutes) call 911. (Patient not taking: Reported on 4/17/2018) 25 tablet 3     ORDER FOR DME 1 Device Respironics System One Auto CPAP @ 8 cm .        No facility-administered encounter medications on file as of 8/8/2018.          Again, thank you for allowing me to participate in the care of your patient.      Sincerely,    Brown Stevens MD     [facility]

## 2018-08-08 NOTE — PROGRESS NOTES
Service Date: 08/08/2018      REASON FOR CLINIC VISIT:  Abnormal stress test.      HISTORY OF PRESENT ILLNESS:  Mr. Mccarty is a very pleasant 89-year-old gentleman with history of hypertension, dyslipidemia and depression who I am seeing in the Cardiology Clinic for the first time because of an abnormal stress test.  The patient had a Lexiscan stress test done about 1-1/2 months ago.  This showed a small reversible mid and distal inferior defect overall suggestive of small territory of inferior ischemia.  LV function was normal at 57%.        The patient tells me remarkably he does not feel any particular symptoms like chest discomfort or shortness of breath.  His main issue is that over the last several months he has noticed decline in his energy level and he needs to take a short nap in the afternoon and once he takes a nap, he is full of energy again.  He walks at least 30 minutes with a stretch.  He also climbs stairs.  No chest discomfort or shortness of breath at rest or with physical activity.  No chest tightness or jaundice discomfort or arm discomfort with exertion.        He is on baby aspirin 81 mg daily and Lipitor 20 mg daily.        The patient tells me that he has had a good life so far and he is happy with his health right now, has no complaints.  All of his friends have passed away.      PHYSICAL EXAMINATION:   VITAL SIGNS:  Blood pressure 110/72, heart rate 86 and regular, weight 217 pounds, BMI 30.37.   GENERAL:  The patient appears pleasant, comfortable.   NECK:  Normal JVP, no bruit.   CARDIOVASCULAR SYSTEM:  S1, S2 normal, no murmur, rub or gallop.   RESPIRATORY SYSTEM:  Clear to auscultation bilaterally.   GASTROINTESTINAL SYSTEM:  Abdomen soft, nontender.   EXTREMITIES:  No pitting pedal edema.   NEUROLOGICAL:  Alert, oriented x3.   PSYCHIATRIC:  Normal affect.   SKIN:  No obvious rash.   HEENT:  No pallor or icterus.      IMPRESSION AND PLAN:  A pleasant 89-year-old gentleman with newly  diagnosed CAD on the basis of abnormal stress test showing mild basal and mid inferior ischemia with normal LV function.  Other comorbidities of hypertension, dyslipidemia on aspirin and statin.        I had a long discussion with the patient regarding the findings of the stress test.  We discussed sensitivity and specificity of the stress test.  Remarkably, he does not appear to have any anginal symptoms at this time.  I repeatedly asked the patient about any exertional-related symptoms, like chest discomfort, tightness or shortness of breath and he emphatically declined them.  We discussed option of continuing medical management versus coronary angiogram.  We discussed risks risk/benefit of each option.  At this time, given the asymptomatic status, only mild level of ischemia and the patient preference, we would continue medical management with aspirin and statin.  Given his age, I think moderate intensity statin is reasonable.  Given the fact that he feels low in energy and fatigue, I am not adding beta blocker at this time.  Also, the fact that he has no symptoms, I am not adding any long-acting nitrates.        At this time, I recommend continuing medical management.  Follow up in 6 months, sooner if he notes any change in clinical symptoms, especially if any exertional-related symptoms.         DERECK DELGADO MD             D: 2018   T: 2018   MT: ALBERTA      Name:     EVA ADAMS   MRN:      [id number]        Account:      [id number]   :      1928           Service Date: 2018      Document: [id number]

## 2018-08-08 NOTE — LETTER
8/8/2018    Leobardo Yoo, DO  [address] [place] [address]    RE: Dean Mccarty       Dear Colleague,    I had the pleasure of seeing Dean Mccarty in the [facility].    HPI and Plan:   See dictation(#[id number])    Orders Placed This Encounter   Procedures     Follow-Up with Cardiologist       No orders of the defined types were placed in this encounter.      There are no discontinued medications.      Encounter Diagnosis   Name Primary?     Coronary artery disease involving native coronary artery of native heart without angina pectoris Yes       CURRENT MEDICATIONS:  Current Outpatient Prescriptions   Medication Sig Dispense Refill     aspirin 81 MG tablet Take 1 tablet by mouth daily.       atorvastatin (LIPITOR) 20 MG tablet TAKE ONE TABLET BY MOUTH ONCE DAILY 90 tablet 1     buPROPion (WELLBUTRIN SR) 150 MG 12 hr tablet TAKE ONE TABLET BY MOUTH TWICE A DAY 60 tablet 6     finasteride (PROSCAR) 5 MG tablet TAKE ONE TABLET BY MOUTH ONCE DAILY 30 tablet 5     ibuprofen (ADVIL,MOTRIN) 600 MG tablet Take 1 tablet (600 mg) by mouth every 6 hours as needed for other (mild pain) 40 tablet 0     loratadine (CLARITIN) 10 MG tablet Take 1 tablet by mouth daily  6     omeprazole (PRILOSEC) 20 MG CR capsule TAKE ONE CAPSULE BY MOUTH ONCE DAILY 30 capsule 5     oxybutynin (DITROPAN) 5 MG tablet TAKE ONE TABLET BY MOUTH TWO TIMES A DAY 60 tablet 5     ranitidine (ZANTAC) 300 MG tablet TAKE ONE TABLET BY MOUTH AT BEDTIME 90 tablet 1     sertraline (ZOLOFT) 100 MG tablet Take 1 tablet (100 mg) by mouth daily 90 tablet 0     tamsulosin (FLOMAX) 0.4 MG capsule Take 1 capsule (0.4 mg) by mouth daily 30 capsule 5     VITAMIN E 400 IU OR CAPS AS DIRECTED QS 0     erythromycin (ROMYCIN) ophthalmic ointment Place 0.25 inches into the right eye 4 times daily O.25 inches into eye(s) 4 times daily (Patient not taking: Reported on 4/17/2018) 1 Tube 0     HYDROcodone-acetaminophen  (NORCO) 5-325 MG per tablet Take 1 tablet by mouth every 8 hours as needed for moderate to severe pain (Patient not taking: Reported on 6/26/2018) 42 tablet 0     nitroglycerin (NITROSTAT) 0.4 MG SL tablet Place 1 tablet (0.4 mg) under the tongue every 5 minutes as needed for chest pain If you are still having symptoms after 3 doses (15 minutes) call 911. (Patient not taking: Reported on 4/17/2018) 25 tablet 3     ORDER FOR DME 1 Device Respironics System One Auto CPAP @ 8 cm .          ALLERGIES     Allergies   Allergen Reactions     No Known Allergies        PAST MEDICAL HISTORY:  Past Medical History:   Diagnosis Date     Chronic ischemic heart disease, unspecified     Coronary artery dis     Depressive disorder, not elsewhere classified      Unspecified essential hypertension      Unspecified hyperplasia of prostate      Weakness generalized 2/21/2017       PAST SURGICAL HISTORY:  Past Surgical History:   Procedure Laterality Date     C ANESTH,DX ARTHROSCOPIC PROC KNEE JOINT  12/12/05    Left knee, with partial meniscectomy.     HC REMOVE TONSILS/ADENOIDS,<13 Y/O      T & A <12y.o.     HC UGI ENDOSCOPY DIAG W BIOPSY  08/25/09       FAMILY HISTORY:  Family History   Problem Relation Age of Onset     Diabetes Maternal Grandmother        SOCIAL HISTORY:  Social History     Social History     Marital status:      Spouse name: Estelita     Number of children: 2     Years of education: 17     Occupational History     Retired Retired     Social History Main Topics     Smoking status: Former Smoker     Packs/day: 2.00     Years: 25.00     Types: Cigarettes     Quit date: 1/1/1970     Smokeless tobacco: Never Used     Alcohol use No      Comment: rarely     Drug use: No     Sexual activity: Yes     Partners: Female     Other Topics Concern      Service Yes     [employer]     Blood Transfusions No     Caffeine Concern Yes     coffee: 3c/d     Occupational Exposure No     Hobby Hazards No     Sleep Concern No      "Stress Concern No     Weight Concern Yes     desire wt loss     Special Diet No     Back Care No     Exercise No     Seat Belt Yes     Parent/Sibling W/ Cabg, Mi Or Angioplasty Before 65f 55m? No     Social History Narrative       Review of Systems:  Skin:  Negative       Eyes:  Positive for glasses    ENT:  Negative      Respiratory:  Positive for dyspnea on exertion     Cardiovascular:  Negative for;palpitations;edema;lightheadedness;dizziness Positive for;chest pain;fatigue some chest discomfort but doesn't last, no pattern to it, describes as a light pain   Gastroenterology: Negative      Genitourinary:  Negative      Musculoskeletal:  Negative      Neurologic:  Negative      Psychiatric:  Negative      Heme/Lymph/Imm:  Negative      Endocrine:  Negative        Physical Exam:  Vitals: /72 (BP Location: Right arm, Patient Position: Fowlers, Cuff Size: Adult Regular)  Pulse 86  Ht 1.803 m (5' 11\")  Wt 98.6 kg (217 lb 4.8 oz)  SpO2 95%  BMI 30.31 kg/m2    Constitutional:           Skin:             Head:           Eyes:           Lymph:      ENT:           Neck:           Respiratory:            Cardiac:                                                           GI:           Extremities and Muscular Skeletal:                 Neurological:           Psych:             CC  Leobardo Yoo,   [address] [place] [id number]                    Thank you for allowing me to participate in the care of your patient.      Sincerely,     Brown Stevens MD     [facility]    cc:   Leobardo Yoo,   [address] [place] [phone]        "

## 2018-08-08 NOTE — LETTER
8/8/2018      Leobardo Yoo, DO  [address] [place] [address]      RE: Dean Guillenson       Dear Colleague,    I had the pleasure of seeing Dean Mccarty in the [facility].    Service Date: 08/08/2018      REASON FOR CLINIC VISIT:  Abnormal stress test.      HISTORY OF PRESENT ILLNESS:  Mr. Mccarty is a very pleasant 89-year-old gentleman with history of hypertension, dyslipidemia and depression who I am seeing in the Cardiology Clinic for the first time because of an abnormal stress test.  The patient had a Lexiscan stress test done about 1-1/2 months ago.  This showed a small reversible mid and distal inferior defect overall suggestive of small territory of inferior ischemia.  LV function was normal at 57%.        The patient tells me remarkably he does not feel any particular symptoms like chest discomfort or shortness of breath.  His main issue is that over the last several months he has noticed decline in his energy level and he needs to take a short nap in the afternoon and once he takes a nap, he is full of energy again.  He walks at least 30 minutes with a stretch.  He also climbs stairs.  No chest discomfort or shortness of breath at rest or with physical activity.  No chest tightness or jaundice discomfort or arm discomfort with exertion.        He is on baby aspirin 81 mg daily and Lipitor 20 mg daily.        The patient tells me that he has had a good life so far and he is happy with his health right now, has no complaints.  All of his friends have passed away.      PHYSICAL EXAMINATION:   VITAL SIGNS:  Blood pressure 110/72, heart rate 86 and regular, weight 217 pounds, BMI 30.37.   GENERAL:  The patient appears pleasant, comfortable.   NECK:  Normal JVP, no bruit.   CARDIOVASCULAR SYSTEM:  S1, S2 normal, no murmur, rub or gallop.   RESPIRATORY SYSTEM:  Clear to auscultation bilaterally.   GASTROINTESTINAL SYSTEM:  Abdomen soft, nontender.    EXTREMITIES:  No pitting pedal edema.   NEUROLOGICAL:  Alert, oriented x3.   PSYCHIATRIC:  Normal affect.   SKIN:  No obvious rash.   HEENT:  No pallor or icterus.      IMPRESSION AND PLAN:  A pleasant 89-year-old gentleman with newly diagnosed CAD on the basis of abnormal stress test showing mild basal and mid inferior ischemia with normal LV function.  Other comorbidities of hypertension, dyslipidemia on aspirin and statin.        I had a long discussion with the patient regarding the findings of the stress test.  We discussed sensitivity and specificity of the stress test.  Remarkably, he does not appear to have any anginal symptoms at this time.  I repeatedly asked the patient about any exertional-related symptoms, like chest discomfort, tightness or shortness of breath and he emphatically declined them.  We discussed option of continuing medical management versus coronary angiogram.  We discussed risks risk/benefit of each option.  At this time, given the asymptomatic status, only mild level of ischemia and the patient preference, we would continue medical management with aspirin and statin.  Given his age, I think moderate intensity statin is reasonable.  Given the fact that he feels low in energy and fatigue, I am not adding beta blocker at this time.  Also, the fact that he has no symptoms, I am not adding any long-acting nitrates.        At this time, I recommend continuing medical management.  Follow up in 6 months, sooner if he notes any change in clinical symptoms, especially if any exertional-related symptoms.         DERECK DELGADO MD             D: 2018   T: 2018   MT: ALBERTA      Name:     EVA ADAMS   MRN:      [id number]        Account:      [id number]   :      1928           Service Date: 2018      Document: [id number]           Outpatient Encounter Prescriptions as of 2018   Medication Sig Dispense Refill     aspirin 81 MG tablet Take 1 tablet by mouth daily.        atorvastatin (LIPITOR) 20 MG tablet TAKE ONE TABLET BY MOUTH ONCE DAILY 90 tablet 1     buPROPion (WELLBUTRIN SR) 150 MG 12 hr tablet TAKE ONE TABLET BY MOUTH TWICE A DAY 60 tablet 6     finasteride (PROSCAR) 5 MG tablet TAKE ONE TABLET BY MOUTH ONCE DAILY 30 tablet 5     ibuprofen (ADVIL,MOTRIN) 600 MG tablet Take 1 tablet (600 mg) by mouth every 6 hours as needed for other (mild pain) 40 tablet 0     loratadine (CLARITIN) 10 MG tablet Take 1 tablet by mouth daily  6     omeprazole (PRILOSEC) 20 MG CR capsule TAKE ONE CAPSULE BY MOUTH ONCE DAILY 30 capsule 5     oxybutynin (DITROPAN) 5 MG tablet TAKE ONE TABLET BY MOUTH TWO TIMES A DAY 60 tablet 5     ranitidine (ZANTAC) 300 MG tablet TAKE ONE TABLET BY MOUTH AT BEDTIME 90 tablet 1     sertraline (ZOLOFT) 100 MG tablet Take 1 tablet (100 mg) by mouth daily 90 tablet 0     tamsulosin (FLOMAX) 0.4 MG capsule Take 1 capsule (0.4 mg) by mouth daily 30 capsule 5     VITAMIN E 400 IU OR CAPS AS DIRECTED QS 0     erythromycin (ROMYCIN) ophthalmic ointment Place 0.25 inches into the right eye 4 times daily O.25 inches into eye(s) 4 times daily (Patient not taking: Reported on 4/17/2018) 1 Tube 0     HYDROcodone-acetaminophen (NORCO) 5-325 MG per tablet Take 1 tablet by mouth every 8 hours as needed for moderate to severe pain (Patient not taking: Reported on 6/26/2018) 42 tablet 0     nitroglycerin (NITROSTAT) 0.4 MG SL tablet Place 1 tablet (0.4 mg) under the tongue every 5 minutes as needed for chest pain If you are still having symptoms after 3 doses (15 minutes) call 911. (Patient not taking: Reported on 4/17/2018) 25 tablet 3     ORDER FOR DME 1 Device Respironics System One Auto CPAP @ 8 cm .        No facility-administered encounter medications on file as of 8/8/2018.        Again, thank you for allowing me to participate in the care of your patient.      Sincerely,    Brown Stevens MD     [facility]

## 2018-08-08 NOTE — LETTER
8/8/2018      Leobardo Yoo, DO  [address] [place] [address]      RE: Dean Guillenson       Dear Colleague,    I had the pleasure of seeing Dean Mccarty in the [facility].    Service Date: 08/08/2018      REASON FOR CLINIC VISIT:  Abnormal stress test.      HISTORY OF PRESENT ILLNESS:  Mr. Mccarty is a very pleasant 89-year-old gentleman with history of hypertension, dyslipidemia and depression who I am seeing in the Cardiology Clinic for the first time because of an abnormal stress test.  The patient had a Lexiscan stress test done about 1-1/2 months ago.  This showed a small reversible mid and distal inferior defect overall suggestive of small territory of inferior ischemia.  LV function was normal at 57%.        The patient tells me remarkably he does not feel any particular symptoms like chest discomfort or shortness of breath.  His main issue is that over the last several months he has noticed decline in his energy level and he needs to take a short nap in the afternoon and once he takes a nap, he is full of energy again.  He walks at least 30 minutes with a stretch.  He also climbs stairs.  No chest discomfort or shortness of breath at rest or with physical activity.  No chest tightness or jaundice discomfort or arm discomfort with exertion.        He is on baby aspirin 81 mg daily and Lipitor 20 mg daily.        The patient tells me that he has had a good life so far and he is happy with his health right now, has no complaints.  All of his friends have passed away.      PHYSICAL EXAMINATION:   VITAL SIGNS:  Blood pressure 110/72, heart rate 86 and regular, weight 217 pounds, BMI 30.37.   GENERAL:  The patient appears pleasant, comfortable.   NECK:  Normal JVP, no bruit.   CARDIOVASCULAR SYSTEM:  S1, S2 normal, no murmur, rub or gallop.   RESPIRATORY SYSTEM:  Clear to auscultation bilaterally.   GASTROINTESTINAL SYSTEM:  Abdomen soft, nontender.    EXTREMITIES:  No pitting pedal edema.   NEUROLOGICAL:  Alert, oriented x3.   PSYCHIATRIC:  Normal affect.   SKIN:  No obvious rash.   HEENT:  No pallor or icterus.      IMPRESSION AND PLAN:  A pleasant 89-year-old gentleman with newly diagnosed CAD on the basis of abnormal stress test showing mild basal and mid inferior ischemia with normal LV function.  Other comorbidities of hypertension, dyslipidemia on aspirin and statin.        I had a long discussion with the patient regarding the findings of the stress test.  We discussed *** and *** of the stress test.  Remarkably, he does not appear to have any anginal symptoms at this time.  I repeatedly asked the patient about any exertional-related symptoms, like chest discomfort, tightness or shortness of breath and he emphatically declined them.  We discussed option of continuing medical management versus coronary angiogram.  We discussed risks risk/benefit of each option.  At this time, given the asymptomatic status only mild level of ischemia and the patient preference, we would continue medical management with aspirin and statin.  Given his age, I think moderate intensity statin is reasonable.  Given the fact that he feels low in energy and fatigue, I am not adding beta blocker at this time.  Also, the fact that he has no symptoms, I am not adding any long-acting nitrates.        At this time, I recommend continuing medical management.  Follow up in 6 months, sooner if he notes any change in clinical symptoms, especially if any exertional-related symptoms.         DERECK DELGADO MD             D: 2018   T: 2018   MT: ALBERTA      Name:     EVA ADAMS   MRN:      [id number]        Account:      [id number]   :      1928           Service Date: 2018      Document: [id number]         Outpatient Encounter Prescriptions as of 2018   Medication Sig Dispense Refill     aspirin 81 MG tablet Take 1 tablet by mouth daily.       atorvastatin  (LIPITOR) 20 MG tablet TAKE ONE TABLET BY MOUTH ONCE DAILY 90 tablet 1     buPROPion (WELLBUTRIN SR) 150 MG 12 hr tablet TAKE ONE TABLET BY MOUTH TWICE A DAY 60 tablet 6     finasteride (PROSCAR) 5 MG tablet TAKE ONE TABLET BY MOUTH ONCE DAILY 30 tablet 5     ibuprofen (ADVIL,MOTRIN) 600 MG tablet Take 1 tablet (600 mg) by mouth every 6 hours as needed for other (mild pain) 40 tablet 0     loratadine (CLARITIN) 10 MG tablet Take 1 tablet by mouth daily  6     omeprazole (PRILOSEC) 20 MG CR capsule TAKE ONE CAPSULE BY MOUTH ONCE DAILY 30 capsule 5     oxybutynin (DITROPAN) 5 MG tablet TAKE ONE TABLET BY MOUTH TWO TIMES A DAY 60 tablet 5     ranitidine (ZANTAC) 300 MG tablet TAKE ONE TABLET BY MOUTH AT BEDTIME 90 tablet 1     sertraline (ZOLOFT) 100 MG tablet Take 1 tablet (100 mg) by mouth daily 90 tablet 0     tamsulosin (FLOMAX) 0.4 MG capsule Take 1 capsule (0.4 mg) by mouth daily 30 capsule 5     VITAMIN E 400 IU OR CAPS AS DIRECTED QS 0     erythromycin (ROMYCIN) ophthalmic ointment Place 0.25 inches into the right eye 4 times daily O.25 inches into eye(s) 4 times daily (Patient not taking: Reported on 4/17/2018) 1 Tube 0     HYDROcodone-acetaminophen (NORCO) 5-325 MG per tablet Take 1 tablet by mouth every 8 hours as needed for moderate to severe pain (Patient not taking: Reported on 6/26/2018) 42 tablet 0     nitroglycerin (NITROSTAT) 0.4 MG SL tablet Place 1 tablet (0.4 mg) under the tongue every 5 minutes as needed for chest pain If you are still having symptoms after 3 doses (15 minutes) call 911. (Patient not taking: Reported on 4/17/2018) 25 tablet 3     ORDER FOR DME 1 Device Respironics System One Auto CPAP @ 8 cm .        No facility-administered encounter medications on file as of 8/8/2018.            Again, thank you for allowing me to participate in the care of your patient.      Sincerely,    Brown Stevens MD     [facility]

## 2018-08-08 NOTE — PROGRESS NOTES
HPI and Plan:   See dictation(#[id number])    Orders Placed This Encounter   Procedures     Follow-Up with Cardiologist       No orders of the defined types were placed in this encounter.      There are no discontinued medications.      Encounter Diagnosis   Name Primary?     Coronary artery disease involving native coronary artery of native heart without angina pectoris Yes       CURRENT MEDICATIONS:  Current Outpatient Prescriptions   Medication Sig Dispense Refill     aspirin 81 MG tablet Take 1 tablet by mouth daily.       atorvastatin (LIPITOR) 20 MG tablet TAKE ONE TABLET BY MOUTH ONCE DAILY 90 tablet 1     buPROPion (WELLBUTRIN SR) 150 MG 12 hr tablet TAKE ONE TABLET BY MOUTH TWICE A DAY 60 tablet 6     finasteride (PROSCAR) 5 MG tablet TAKE ONE TABLET BY MOUTH ONCE DAILY 30 tablet 5     ibuprofen (ADVIL,MOTRIN) 600 MG tablet Take 1 tablet (600 mg) by mouth every 6 hours as needed for other (mild pain) 40 tablet 0     loratadine (CLARITIN) 10 MG tablet Take 1 tablet by mouth daily  6     omeprazole (PRILOSEC) 20 MG CR capsule TAKE ONE CAPSULE BY MOUTH ONCE DAILY 30 capsule 5     oxybutynin (DITROPAN) 5 MG tablet TAKE ONE TABLET BY MOUTH TWO TIMES A DAY 60 tablet 5     ranitidine (ZANTAC) 300 MG tablet TAKE ONE TABLET BY MOUTH AT BEDTIME 90 tablet 1     sertraline (ZOLOFT) 100 MG tablet Take 1 tablet (100 mg) by mouth daily 90 tablet 0     tamsulosin (FLOMAX) 0.4 MG capsule Take 1 capsule (0.4 mg) by mouth daily 30 capsule 5     VITAMIN E 400 IU OR CAPS AS DIRECTED QS 0     erythromycin (ROMYCIN) ophthalmic ointment Place 0.25 inches into the right eye 4 times daily O.25 inches into eye(s) 4 times daily (Patient not taking: Reported on 4/17/2018) 1 Tube 0     HYDROcodone-acetaminophen (NORCO) 5-325 MG per tablet Take 1 tablet by mouth every 8 hours as needed for moderate to severe pain (Patient not taking: Reported on 6/26/2018) 42 tablet 0     nitroglycerin (NITROSTAT) 0.4 MG SL tablet Place 1 tablet (0.4 mg)  under the tongue every 5 minutes as needed for chest pain If you are still having symptoms after 3 doses (15 minutes) call 911. (Patient not taking: Reported on 4/17/2018) 25 tablet 3     ORDER FOR DME 1 Device Respironics System One Auto CPAP @ 8 cm .          ALLERGIES     Allergies   Allergen Reactions     No Known Allergies        PAST MEDICAL HISTORY:  Past Medical History:   Diagnosis Date     Chronic ischemic heart disease, unspecified     Coronary artery dis     Depressive disorder, not elsewhere classified      Unspecified essential hypertension      Unspecified hyperplasia of prostate      Weakness generalized 2/21/2017       PAST SURGICAL HISTORY:  Past Surgical History:   Procedure Laterality Date     C ANESTH,DX ARTHROSCOPIC PROC KNEE JOINT  12/12/05    Left knee, with partial meniscectomy.     HC REMOVE TONSILS/ADENOIDS,<13 Y/O      T & A <12y.o.     HC UGI ENDOSCOPY DIAG W BIOPSY  08/25/09       FAMILY HISTORY:  Family History   Problem Relation Age of Onset     Diabetes Maternal Grandmother        SOCIAL HISTORY:  Social History     Social History     Marital status:      Spouse name: Estelita     Number of children: 2     Years of education: 17     Occupational History     Retired Retired     Social History Main Topics     Smoking status: Former Smoker     Packs/day: 2.00     Years: 25.00     Types: Cigarettes     Quit date: 1/1/1970     Smokeless tobacco: Never Used     Alcohol use No      Comment: rarely     Drug use: No     Sexual activity: Yes     Partners: Female     Other Topics Concern      Service Yes     [employer]     Blood Transfusions No     Caffeine Concern Yes     coffee: 3c/d     Occupational Exposure No     Hobby Hazards No     Sleep Concern No     Stress Concern No     Weight Concern Yes     desire wt loss     Special Diet No     Back Care No     Exercise No     Seat Belt Yes     Parent/Sibling W/ Cabg, Mi Or Angioplasty Before 65f 55m? No     Social History Narrative  "      Review of Systems:  Skin:  Negative       Eyes:  Positive for glasses    ENT:  Negative      Respiratory:  Positive for dyspnea on exertion     Cardiovascular:  Negative for;palpitations;edema;lightheadedness;dizziness Positive for;chest pain;fatigue some chest discomfort but doesn't last, no pattern to it, describes as a light pain   Gastroenterology: Negative      Genitourinary:  Negative      Musculoskeletal:  Negative      Neurologic:  Negative      Psychiatric:  Negative      Heme/Lymph/Imm:  Negative      Endocrine:  Negative        Physical Exam:  Vitals: /72 (BP Location: Right arm, Patient Position: Fowlers, Cuff Size: Adult Regular)  Pulse 86  Ht 1.803 m (5' 11\")  Wt 98.6 kg (217 lb 4.8 oz)  SpO2 95%  BMI 30.31 kg/m2    Constitutional:           Skin:             Head:           Eyes:           Lymph:      ENT:           Neck:           Respiratory:            Cardiac:                                                           GI:           Extremities and Muscular Skeletal:                 Neurological:           Psych:             CC  Leobardo Yoo, DO  [address] [place] [address]                  "

## 2018-08-08 NOTE — LETTER
8/8/2018      Leobardo Yoo, DO  [address] [place] [address]      RE: Dean Guillenson       Dear Colleague,    I had the pleasure of seeing Dean Mccarty in the [facility].    Service Date: 08/08/2018      REASON FOR CLINIC VISIT:  Abnormal stress test.      HISTORY OF PRESENT ILLNESS:  Mr. Mccarty is a very pleasant 89-year-old gentleman with history of hypertension, dyslipidemia and depression who I am seeing in the Cardiology Clinic for the first time because of an abnormal stress test.  The patient had a Lexiscan stress test done about 1-1/2 months ago.  This showed a small reversible mid and distal inferior defect overall suggestive of small territory of inferior ischemia.  LV function was normal at 57%.        The patient tells me remarkably he does not feel any particular symptoms like chest discomfort or shortness of breath.  His main issue is that over the last several months he has noticed decline in his energy level and he needs to take a short nap in the afternoon and once he takes a nap, he is full of energy again.  He walks at least 30 minutes with a stretch.  He also climbs stairs.  No chest discomfort or shortness of breath at rest or with physical activity.  No chest tightness or jaundice discomfort or arm discomfort with exertion.        He is on baby aspirin 81 mg daily and Lipitor 20 mg daily.        The patient tells me that he has had a good life so far and he is happy with his health right now, has no complaints.  All of his friends have passed away.      PHYSICAL EXAMINATION:   VITAL SIGNS:  Blood pressure 110/72, heart rate 86 and regular, weight 217 pounds, BMI 30.37.   GENERAL:  The patient appears pleasant, comfortable.   NECK:  Normal JVP, no bruit.   CARDIOVASCULAR SYSTEM:  S1, S2 normal, no murmur, rub or gallop.   RESPIRATORY SYSTEM:  Clear to auscultation bilaterally.   GASTROINTESTINAL SYSTEM:  Abdomen soft, nontender.    EXTREMITIES:  No pitting pedal edema.   NEUROLOGICAL:  Alert, oriented x3.   PSYCHIATRIC:  Normal affect.   SKIN:  No obvious rash.   HEENT:  No pallor or icterus.      IMPRESSION AND PLAN:  A pleasant 89-year-old gentleman with newly diagnosed CAD on the basis of abnormal stress test showing mild basal and mid inferior ischemia with normal LV function.  Other comorbidities of hypertension, dyslipidemia on aspirin and statin.        I had a long discussion with the patient regarding the findings of the stress test.  We discussed *** and *** of the stress test.  Remarkably, he does not appear to have any anginal symptoms at this time.  I repeatedly asked the patient about any exertional-related symptoms, like chest discomfort, tightness or shortness of breath and he emphatically declined them.  We discussed option of continuing medical management versus coronary angiogram.  We discussed risks risk/benefit of each option.  At this time, given the asymptomatic status only mild level of ischemia and the patient preference, we would continue medical management with aspirin and statin.  Given his age, I think moderate intensity statin is reasonable.  Given the fact that he feels low in energy and fatigue, I am not adding beta blocker at this time.  Also, the fact that he has no symptoms, I am not adding any long-acting nitrates.        At this time, I recommend continuing medical management.  Follow up in 6 months, sooner if he notes any change in clinical symptoms, especially if any exertional-related symptoms.         DERECK DELGADO MD             D: 2018   T: 2018   MT: ALBERTA      Name:     EVA ADAMS   MRN:      [id number]        Account:      [id number]   :      1928           Service Date: 2018      Document: [id number]         Outpatient Encounter Prescriptions as of 2018   Medication Sig Dispense Refill     aspirin 81 MG tablet Take 1 tablet by mouth daily.       atorvastatin  (LIPITOR) 20 MG tablet TAKE ONE TABLET BY MOUTH ONCE DAILY 90 tablet 1     buPROPion (WELLBUTRIN SR) 150 MG 12 hr tablet TAKE ONE TABLET BY MOUTH TWICE A DAY 60 tablet 6     finasteride (PROSCAR) 5 MG tablet TAKE ONE TABLET BY MOUTH ONCE DAILY 30 tablet 5     ibuprofen (ADVIL,MOTRIN) 600 MG tablet Take 1 tablet (600 mg) by mouth every 6 hours as needed for other (mild pain) 40 tablet 0     loratadine (CLARITIN) 10 MG tablet Take 1 tablet by mouth daily  6     omeprazole (PRILOSEC) 20 MG CR capsule TAKE ONE CAPSULE BY MOUTH ONCE DAILY 30 capsule 5     oxybutynin (DITROPAN) 5 MG tablet TAKE ONE TABLET BY MOUTH TWO TIMES A DAY 60 tablet 5     ranitidine (ZANTAC) 300 MG tablet TAKE ONE TABLET BY MOUTH AT BEDTIME 90 tablet 1     sertraline (ZOLOFT) 100 MG tablet Take 1 tablet (100 mg) by mouth daily 90 tablet 0     tamsulosin (FLOMAX) 0.4 MG capsule Take 1 capsule (0.4 mg) by mouth daily 30 capsule 5     VITAMIN E 400 IU OR CAPS AS DIRECTED QS 0     erythromycin (ROMYCIN) ophthalmic ointment Place 0.25 inches into the right eye 4 times daily O.25 inches into eye(s) 4 times daily (Patient not taking: Reported on 4/17/2018) 1 Tube 0     HYDROcodone-acetaminophen (NORCO) 5-325 MG per tablet Take 1 tablet by mouth every 8 hours as needed for moderate to severe pain (Patient not taking: Reported on 6/26/2018) 42 tablet 0     nitroglycerin (NITROSTAT) 0.4 MG SL tablet Place 1 tablet (0.4 mg) under the tongue every 5 minutes as needed for chest pain If you are still having symptoms after 3 doses (15 minutes) call 911. (Patient not taking: Reported on 4/17/2018) 25 tablet 3     ORDER FOR DME 1 Device Respironics System One Auto CPAP @ 8 cm .        No facility-administered encounter medications on file as of 8/8/2018.          Again, thank you for allowing me to participate in the care of your patient.      Sincerely,    Brown Stevens MD     [facility]

## 2018-08-08 NOTE — MR AVS SNAPSHOT
"              After Visit Summary   8/8/2018    Dean Mccarty    MRN: [id number]           Patient Information     Date Of Birth          7/6/1928        Visit Information        Provider Department      8/8/2018 10:00 AM Brown Stevens MD [facility]        Today's Diagnoses     Coronary artery disease involving native coronary artery of native heart without angina pectoris    -  1       Follow-ups after your visit        Additional Services     Follow-Up with Cardiologist                 Future tests that were ordered for you today     Open Future Orders        Priority Expected Expires Ordered    Follow-Up with Cardiologist Routine 2/4/2019 8/8/2019 8/8/2018            Who to contact     If you have questions or need follow up information about today's clinic visit or your schedule please contact [facility] directly at [phone].  Normal or non-critical lab and imaging results will be communicated to you by MyChart, letter or phone within 4 business days after the clinic has received the results. If you do not hear from us within 7 days, please contact the clinic through MyChart or phone. If you have a critical or abnormal lab result, we will notify you by phone as soon as possible.  Submit refill requests through [employer] or call your pharmacy and they will forward the refill request to us. Please allow 3 business days for your refill to be completed.          Additional Information About Your Visit        Care EveryWhere ID     This is your Care EveryWhere ID. This could be used by other organizations to access your [place] medical records  [id number]        Your Vitals Were     Pulse Height Pulse Oximetry BMI (Body Mass Index)          86 1.803 m (5' 11\") 95% 30.31 kg/m2         Blood Pressure from Last 3 Encounters:   08/08/18 110/72   06/26/18 112/60   05/16/18 120/60    Weight from Last 3 Encounters:   08/08/18 " 98.6 kg (217 lb 4.8 oz)   06/26/18 99.1 kg (218 lb 6.4 oz)   05/16/18 98.9 kg (218 lb)               Primary Care Provider Office Phone # Fax #    Leobardo Yoo [phone]        [address] [place] [address]        Equal Access to Services     [facility] WAQAS : Hadii aad ku hadasho Soomaali, waaxda luqadaha, qaybta kaalmada adeegyada, waxay idiin hayaan adeeg kharash la'aan ah. So [facility] [phone].    ATENCIÓN: Si habla español, tiene a machado disposición servicios gratuitos de asistencia lingüística. Llame al [phone].    We comply with applicable federal civil rights laws and [place] laws. We do not discriminate on the basis of race, color, national origin, age, disability, sex, sexual orientation, or gender identity.            Thank you!     Thank you for choosing [facility]  for your care. Our goal is always to provide you with excellent care. Hearing back from our patients is one way we can continue to improve our services. Please take a few minutes to complete the written survey that you may receive in the mail after your visit with us. Thank you!             Your Updated Medication List - Protect others around you: Learn how to safely use, store and throw away your medicines at www.disposemymeds.org.          This list is accurate as of 8/8/18 10:30 AM.  Always use your most recent med list.                   Brand Name Dispense Instructions for use Diagnosis    aspirin 81 MG tablet      Take 1 tablet by mouth daily.        atorvastatin 20 MG tablet    LIPITOR    90 tablet    TAKE ONE TABLET BY MOUTH ONCE DAILY    Hyperlipidemia LDL goal <100, Chronic ischemic heart disease       buPROPion 150 MG 12 hr tablet    WELLBUTRIN SR    60 tablet    TAKE ONE TABLET BY MOUTH TWICE A DAY    Major depressive disorder, recurrent episode, mild (H)       erythromycin ophthalmic ointment    ROMYCIN    1 Tube    Place 0.25 inches into the  right eye 4 times daily O.25 inches into eye(s) 4 times daily    Blepharitis of both upper and lower eyelid of right eye, unspecified type       finasteride 5 MG tablet    PROSCAR    30 tablet    TAKE ONE TABLET BY MOUTH ONCE DAILY    Hyperplasia of prostate       HYDROcodone-acetaminophen 5-325 MG per tablet    NORCO    42 tablet    Take 1 tablet by mouth every 8 hours as needed for moderate to severe pain    Compression fracture       ibuprofen 600 MG tablet    ADVIL/MOTRIN    40 tablet    Take 1 tablet (600 mg) by mouth every 6 hours as needed for other (mild pain)    Acute pharyngitis due to other specified organisms       loratadine 10 MG tablet    CLARITIN     Take 1 tablet by mouth daily        nitroGLYcerin 0.4 MG sublingual tablet    NITROSTAT    25 tablet    Place 1 tablet (0.4 mg) under the tongue every 5 minutes as needed for chest pain If you are still having symptoms after 3 doses (15 minutes) call 911.    Chronic ischemic heart disease, unspecified       omeprazole 20 MG CR capsule    priLOSEC    30 capsule    TAKE ONE CAPSULE BY MOUTH ONCE DAILY    Gastroesophageal reflux disease without esophagitis       order for DME      1 Device Respironics System One Auto CPAP @ 8 cm .        oxybutynin 5 MG tablet    DITROPAN    60 tablet    TAKE ONE TABLET BY MOUTH TWO TIMES A DAY    Hyperplasia of prostate       ranitidine 300 MG tablet    ZANTAC    90 tablet    TAKE ONE TABLET BY MOUTH AT BEDTIME    Gastroesophageal reflux disease without esophagitis       sertraline 100 MG tablet    ZOLOFT    90 tablet    Take 1 tablet (100 mg) by mouth daily    Major depressive disorder, recurrent episode, mild (H)       tamsulosin 0.4 MG capsule    FLOMAX    30 capsule    Take 1 capsule (0.4 mg) by mouth daily    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 tract symptoms       vitamin E 400 UNIT capsule     QS    AS DIRECTED

## 2018-10-11 NOTE — MR AVS SNAPSHOT
After Visit Summary   10/11/2018    Dean Mccarty    MRN: [id number]           Patient Information     Date Of Birth          7/6/1928        Visit Information        Provider Department      10/11/2018 10:15 AM MAULIK GALICIA MA [facility]        Today's Diagnoses     Need for prophylactic vaccination and inoculation against influenza    -  1       Follow-ups after your visit        Who to contact     If you have questions or need follow up information about today's clinic visit or your schedule please contact [facility] directly at [phone].  Normal or non-critical lab and imaging results will be communicated to you by MyChart, letter or phone within 4 business days after the clinic has received the results. If you do not hear from us within 7 days, please contact the clinic through MyChart or phone. If you have a critical or abnormal lab result, we will notify you by phone as soon as possible.  Submit refill requests through [email or web address] or call your pharmacy and they will forward the refill request to us. Please allow 3 business days for your refill to be completed.          Additional Information About Your Visit        Care EveryWhere ID     This is your Care EveryWhere ID. This could be used by other organizations to access your [place] medical records  [id number]         Blood Pressure from Last 3 Encounters:   08/08/18 110/72   06/26/18 112/60   05/16/18 120/60    Weight from Last 3 Encounters:   08/08/18 217 lb 4.8 oz (98.6 kg)   06/26/18 218 lb 6.4 oz (99.1 kg)   05/16/18 218 lb (98.9 kg)              We Performed the Following     FLU VACCINE, INCREASED ANTIGEN, PRESV FREE, AGE 65+ [60518]     Vaccine Administration, Initial [02679]        Primary Care Provider Office Phone # Fax #    Leobardo Mikey Yoo [phone]       [address] [place] [address]        Equal Access to Services     AUNM BARBOUR: Ankit powell  [place], sladeda luqadaha, qaybta kaaleyad apple, mary carmen mina stevankatie barajaschristiano mague. So [facility] [place] [phone].    ATENCIÓN: Si benita mast, tiene a machado disposición servicios gratuitos de asistencia lingüística. Davonte al [phone].    We comply with applicable federal civil rights laws and [place] laws. We do not discriminate on the basis of race, color, national origin, age, disability, sex, sexual orientation, or gender identity.            Thank you!     Thank you for choosing [facility]  for your care. Our goal is always to provide you with excellent care. Hearing back from our patients is one way we can continue to improve our services. Please take a few minutes to complete the written survey that you may receive in the mail after your visit with us. Thank you!             Your Updated Medication List - Protect others around you: Learn how to safely use, store and throw away your medicines at www.disposemymeds.org.          This list is accurate as of 10/11/18 10:26 AM.  Always use your most recent med list.                   Brand Name Dispense Instructions for use Diagnosis    aspirin 81 MG tablet      Take 1 tablet by mouth daily.        atorvastatin 20 MG tablet    LIPITOR    90 tablet    TAKE ONE TABLET BY MOUTH ONCE DAILY    Hyperlipidemia LDL goal <100, Chronic ischemic heart disease       buPROPion 150 MG 12 hr tablet    WELLBUTRIN SR    60 tablet    TAKE ONE TABLET BY MOUTH TWICE A DAY    Major depressive disorder, recurrent episode, mild (H)       erythromycin ophthalmic ointment    ROMYCIN    1 Tube    Place 0.25 inches into the right eye 4 times daily O.25 inches into eye(s) 4 times daily    Blepharitis of both upper and lower eyelid of right eye, unspecified type       finasteride 5 MG tablet    PROSCAR    30 tablet    TAKE ONE TABLET BY MOUTH ONCE DAILY    Hyperplasia of prostate       HYDROcodone-acetaminophen 5-325 MG per tablet    NORCO    42 tablet    Take 1 tablet  by mouth every 8 hours as needed for moderate to severe pain    Compression fracture       ibuprofen 600 MG tablet    ADVIL/MOTRIN    40 tablet    Take 1 tablet (600 mg) by mouth every 6 hours as needed for other (mild pain)    Acute pharyngitis due to other specified organisms       loratadine 10 MG tablet    CLARITIN     Take 1 tablet by mouth daily        nitroGLYcerin 0.4 MG sublingual tablet    NITROSTAT    25 tablet    Place 1 tablet (0.4 mg) under the tongue every 5 minutes as needed for chest pain If you are still having symptoms after 3 doses (15 minutes) call 911.    Chronic ischemic heart disease, unspecified       omeprazole 20 MG CR capsule    priLOSEC    30 capsule    TAKE ONE CAPSULE BY MOUTH ONCE DAILY    Gastroesophageal reflux disease without esophagitis       order for DME      1 Device Respironics System One Auto CPAP @ 8 cm .        oxybutynin 5 MG tablet    DITROPAN    60 tablet    TAKE ONE TABLET BY MOUTH TWO TIMES A DAY    Hyperplasia of prostate       ranitidine 300 MG tablet    ZANTAC    90 tablet    TAKE ONE TABLET BY MOUTH AT BEDTIME    Gastroesophageal reflux disease without esophagitis       sertraline 100 MG tablet    ZOLOFT    90 tablet    Take 1 tablet (100 mg) by mouth daily    Major depressive disorder, recurrent episode, mild (H)       tamsulosin 0.4 MG capsule    FLOMAX    30 capsule    Take 1 capsule (0.4 mg) by mouth daily    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 tract symptoms       vitamin E 400 UNIT capsule     QS    AS DIRECTED

## 2018-10-11 NOTE — PROGRESS NOTES
Injectable Influenza Immunization Documentation    1.  Is the person to be vaccinated sick today?   No    2. Does the person to be vaccinated have an allergy to a component   of the vaccine?   No  Egg Allergy Algorithm Link    3. Has the person to be vaccinated ever had a serious reaction   to influenza vaccine in the past?   No    4. Has the person to be vaccinated ever had Guillain-Barré syndrome?   No    Form completed by Carin Velez CMA  Prior to injection verified patient identity using patient's name and date of birth.  Due to injection administration, patient instructed to remain in clinic for 15 minutes  afterwards, and to report any adverse reaction to me immediately.

## 2018-10-19 NOTE — TELEPHONE ENCOUNTER
Routing refill request to provider for review/approval because:  Labs out of range:  LDL  Labs not current:  LDL    MARCO A WiseN, RN  [facility]

## 2018-10-19 NOTE — TELEPHONE ENCOUNTER
"Requested Prescriptions   Pending Prescriptions Disp Refills     atorvastatin (LIPITOR) 20 MG tablet [Pharmacy Med Name: ATORVASTATIN CALCIUM 20MG TABS] 90 tablet 1    Last Written Prescription Date:  5/1/18  Last Fill Quantity: 90,  # refills: 1   Last office visit: 3/6/2017 with prescribing provider:  6/26/18   Future Office Visit:     Sig: TAKE ONE TABLET BY MOUTH ONCE DAILY    Statins Protocol Failed    10/18/2018  1:02 AM       Failed - LDL on file in past 12 months    Recent Labs   Lab Test  06/27/16   1138   LDL  108*            Passed - No abnormal creatine kinase in past 12 months    Recent Labs   Lab Test  02/21/17   0925   CKT  484*               Passed - Recent (12 mo) or future (30 days) visit within the authorizing provider's specialty    Patient had office visit in the last 12 months or has a visit in the next 30 days with authorizing provider or within the authorizing provider's specialty.  See \"Patient Info\" tab in inbasket, or \"Choose Columns\" in Meds & Orders section of the refill encounter.             Passed - Patient is age 18 or older        "

## 2018-11-10 NOTE — MR AVS SNAPSHOT
After Visit Summary   11/10/2018    Dean Mccarty    MRN: [id number]           Patient Information     Date Of Birth          7/6/1928        Visit Information        Provider Department      11/10/2018 2:40 PM Emilia Saez MD [facility]        Today's Diagnoses     Impacted cerumen of right ear    -  1       Follow-ups after your visit        Who to contact     You can reach your care team any time of the day by calling [phone].  Notification of test results:  If you have an abnormal lab result, we will notify you by phone as soon as possible.         Additional Information About Your Visit        Care EveryWhere ID     This is your Care EveryWhere ID. This could be used by other organizations to access your [facility] medical records  [id number]        Your Vitals Were     Pulse Temperature Pulse Oximetry             75 97.5  F (36.4  C) (Oral) 99%          Blood Pressure from Last 3 Encounters:   11/10/18 (!) 173/91   08/08/18 110/72   06/26/18 112/60    Weight from Last 3 Encounters:   08/08/18 217 lb 4.8 oz (98.6 kg)   06/26/18 218 lb 6.4 oz (99.1 kg)   05/16/18 218 lb (98.9 kg)              Today, you had the following     No orders found for display       Primary Care Provider Office Phone # Fax #    Leobardo Mikey Yoo [phone]       [address] [place] [address]        Equal Access to Services     [facility]: Hadii aad ku hadasho Soomaali, waaxda luqadaha, qaybta kaalmada adeegyada, mary carmen ha hayevitan leopoldo segura . So [facility] [phone].    ATENCIÓN: Si habla español, tiene a machado disposición servicios gratuitos de asistencia lingüística. Llame al [phone].    We comply with applicable federal civil rights laws and [place] laws. We do not discriminate on the basis of race, color, national origin, age, disability, sex, sexual orientation, or gender identity.            Thank you!     Thank you for choosing  [facility]  for your care. Our goal is always to provide you with excellent care. Hearing back from our patients is one way we can continue to improve our services. Please take a few minutes to complete the written survey that you may receive in the mail after your visit with us. Thank you!             Your Updated Medication List - Protect others around you: Learn how to safely use, store and throw away your medicines at www.disposemymeds.org.          This list is accurate as of 11/10/18  3:04 PM.  Always use your most recent med list.                   Brand Name Dispense Instructions for use Diagnosis    aspirin 81 MG tablet      Take 1 tablet by mouth daily.        atorvastatin 20 MG tablet    LIPITOR    90 tablet    TAKE ONE TABLET BY MOUTH ONCE DAILY    Hyperlipidemia LDL goal <100, Chronic ischemic heart disease       buPROPion 150 MG 12 hr tablet    WELLBUTRIN SR    60 tablet    TAKE ONE TABLET BY MOUTH TWICE A DAY    Major depressive disorder, recurrent episode, mild (H)       erythromycin ophthalmic ointment    ROMYCIN    1 Tube    Place 0.25 inches into the right eye 4 times daily O.25 inches into eye(s) 4 times daily    Blepharitis of both upper and lower eyelid of right eye, unspecified type       finasteride 5 MG tablet    PROSCAR    30 tablet    TAKE ONE TABLET BY MOUTH ONCE DAILY    Hyperplasia of prostate       HYDROcodone-acetaminophen 5-325 MG per tablet    NORCO    42 tablet    Take 1 tablet by mouth every 8 hours as needed for moderate to severe pain    Compression fracture       ibuprofen 600 MG tablet    ADVIL/MOTRIN    40 tablet    Take 1 tablet (600 mg) by mouth every 6 hours as needed for other (mild pain)    Acute pharyngitis due to other specified organisms       loratadine 10 MG tablet    CLARITIN     Take 1 tablet by mouth daily        nitroGLYcerin 0.4 MG sublingual tablet    NITROSTAT    25 tablet    Place 1 tablet (0.4 mg) under the tongue every 5 minutes as  needed for chest pain If you are still having symptoms after 3 doses (15 minutes) call 911.    Chronic ischemic heart disease, unspecified       omeprazole 20 MG CR capsule    priLOSEC    30 capsule    TAKE ONE CAPSULE BY MOUTH ONCE DAILY    Gastroesophageal reflux disease without esophagitis       order for DME      1 Device Respironics System One Auto CPAP @ 8 cm .        oxybutynin 5 MG tablet    DITROPAN    60 tablet    TAKE ONE TABLET BY MOUTH TWO TIMES A DAY    Hyperplasia of prostate       ranitidine 300 MG tablet    ZANTAC    90 tablet    TAKE ONE TABLET BY MOUTH AT BEDTIME    Gastroesophageal reflux disease without esophagitis       sertraline 100 MG tablet    ZOLOFT    90 tablet    Take 1 tablet (100 mg) by mouth daily    Major depressive disorder, recurrent episode, mild (H)       tamsulosin 0.4 MG capsule    FLOMAX    30 capsule    Take 1 capsule (0.4 mg) by mouth daily    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 tract symptoms       vitamin E 400 UNIT capsule     QS    AS DIRECTED

## 2018-11-10 NOTE — PROGRESS NOTES
[facility] [place]        Emilia Saez MD, MPH  11/10/2018        History:      A pleasant 89 year old year old male is seen for right ear feeling plugged. No drainage and no pain is referred.No headache.         Assessment and Plan:        Right ear cerumen impaction.  After obtaining consent right ear was irrigated w tap water removing the impacted cerumen. Follow up inspection reveals a clear right EAC. Patient tolerated the procedure well and felt that he could hear better.    Follow-up with your PCP as needed.                   Physical Exam:      BP (!) 173/91 (Cuff Size: Adult Regular)  Pulse 75  Temp 97.5  F (36.4  C) (Oral)  SpO2 99%     Constitutional: Patient is in no distress The patient is pleasant and cooperative.   HEENT: Head:  Head is atraumatic, normocephalic.    Eyes: Pupils are equal, round and reactive to light and accomodation.  Sclera is non-icteric. No conjunctival injection, or exudate noted. Extraocular motion is intact. Visual acuity is intact bilaterally.  Ears:  Right External acoustic canal with cerumen impaction. Left EAC is patent and clear. There is no erythema or bulging of the Left  tympanic membrane.   Nose:  No Nasal congestion w/o drainage or mucosal ulceration is noted.  Throat:  Oral mucosa is moist.  No oral lesions are noted.  No posterior pharyngeal hyperemia or exudate noted.     Neck Supple.  There is no cervical lymphadenopathy.  No nuchal rigidity noted.  There is no meningismus.     Cardiovascular: Heart is regular to rate and rhythm.  No murmur is noted.     Chest. Chest Symmetrical, no soft tissues, swelling, or tenderness upon palpation   Lungs: Clear in the anterior and posterior pulmonary fields.   Abdomen: Soft and non-tender.    Back No flank tenderness is noted.   Extremeties No edema, no calf tenderness.   Neuro: No focal deficit.   Skin No petechiae or purpura is noted.  There is no rash.   Mood Normal               Medications:        PRN Meds:          Data:      All new lab and imaging data was reviewed.   Results for orders placed or performed during the hospital encounter of 06/28/18   NM Exercise stress test    Narrative    GATED MYOCARDIAL PERFUSION SCINTIGRAPHY WITH INTRAVENOUS PHARMACOLOGIC  VASODILATATION LEXISCAN -ONE DAY STUDY     6/28/2018 12:07 PM  EVA ADAMS  89 years  Male  7/6/1928.    Indication/Clinical History: Unstable angina    Impression  1.  Myocardial perfusion imaging using single isotope technique  demonstrated a small reversible mid and distal inferior wall defect  suggesting a small territory of ischemia.   2. Gated images demonstrated normal wall motion.  The left ventricular  systolic function is normal with a calculated ejection fraction of  57%.  3. Compared to the prior study from thousand and 10, this small  territory of ischemia may be a new finding .    Procedure  Pharmacologic stress testing was performed with Lexiscan at a rate of  0.08 mg/ml rapid bolus injection, for 15 seconds, 0.4 mg/5ml  intravenously. Low-level exercise was not performed along with the  vasodilator infusion.  The heart rate was 73 at baseline and doug to  104 beats per minute during the Lexiscan infusion. The rest blood  pressure was 128/70 mmHg and was 154/70 mm Hg during Lexiscan  infusion. The patient experienced no chest pain or shortness of breath   during the test.    Myocardial perfusion imaging was performed at rest, approximately 45  minutes after the injection intravenously of 10.6 mCi of Tc-99m  Myoview. At peak pharmacologic effect, 10-20 seconds after Lexiscan,   the patient was injected intravenously with 32.9 mCi of  Tc-99m  Myoview. The post-stress tomographic imaging was performed  approximately 60 minutes after stress.    EKG Findings  The resting EKG demonstrated sinus rhythm and left anterior hemiblock.  The stress EKG demonstrated no significant ST segment changes.    Tomographic  Findings  Overall, the study quality is adequate . On the stress images, there  is a small defect involving the mid and distal inferior wall with a  mild reduction in radiotracer uptake. On the rest images, myocardial  perfusion appeared normal . Gated images demonstrated normal wall  motion. The left ventricular ejection fraction was calculated to be  57%. TID was visually present though this may not be a reliable  finding in Lexiscan studies.    AARON ORELLANA MD

## 2018-11-12 NOTE — TELEPHONE ENCOUNTER
"tamsulosin  Last Written Prescription Date:  05/11/2018  Last Fill Quantity: 30,  # refills: 5   Last office visit: 10/11/2018 with prescribing provider:      Future Office Visit:      Requested Prescriptions   Pending Prescriptions Disp Refills     tamsulosin (FLOMAX) 0.4 MG capsule 30 capsule 5     Sig: Take 1 capsule (0.4 mg) by mouth daily    Alpha Blockers Passed    11/7/2018  4:01 PM       Passed - Blood pressure under 140/90 in past 12 months    BP Readings from Last 3 Encounters:   11/10/18 (!) 173/91   08/08/18 110/72   06/26/18 112/60          Passed - Recent (12 mo) or future (30 days) visit within the authorizing provider's specialty    Patient had office visit in the last 12 months or has a visit in the next 30 days with authorizing provider or within the authorizing provider's specialty.  See \"Patient Info\" tab in inbasket, or \"Choose Columns\" in Meds & Orders section of the refill encounter.             Passed - Patient does not have Tadalafil, Vardenafil, or Sildenafil on their medication list       Passed - Patient is 18 years of age or older          Routing refill request to provider for review/approval because:  /91  Brooke Maxwell RN on 11/12/2018 at 7:38 AM    "

## 2018-11-13 NOTE — TELEPHONE ENCOUNTER
"Prescription approved per RN refill protocol.  Bren Ray RN, BSN        Omeprazole  Last Written Prescription Date:  5/11/2018  Last Fill Quantity: 30,  # refills: 5   Last office visit: 3/6/2017 with prescribing provider:  6/26/2018   Future Office Visit:      Requested Prescriptions   Pending Prescriptions Disp Refills     omeprazole (PRILOSEC) 20 MG CR capsule [Pharmacy Med Name: OMEPRAZOLE 20MG CPDR] 30 capsule 5     Sig: TAKE ONE CAPSULE BY MOUTH ONCE DAILY    PPI Protocol Passed    11/12/2018  1:02 AM       Passed - Not on Clopidogrel (unless Pantoprazole ordered)       Passed - No diagnosis of osteoporosis on record       Passed - Recent (12 mo) or future (30 days) visit within the authorizing provider's specialty    Patient had office visit in the last 12 months or has a visit in the next 30 days with authorizing provider or within the authorizing provider's specialty.  See \"Patient Info\" tab in inbasket, or \"Choose Columns\" in Meds & Orders section of the refill encounter.             Passed - Patient is age 18 or older        Bren Ray RN on 11/13/2018 at 11:11 AM    "

## 2018-11-23 NOTE — TELEPHONE ENCOUNTER
"Requested Prescriptions   Pending Prescriptions Disp Refills     ranitidine (ZANTAC) 300 MG tablet [Pharmacy Med Name: RANITIDINE HCL 300MG TABS] 90 tablet 1    Last Written Prescription Date:  9/18/17  Last Fill Quantity: 90,  # refills: 1   Last office visit: 10/11/2018 with prescribing provider:  6/26/18   Future Office Visit:     Sig: TAKE ONE TABLET BY MOUTH AT BEDTIME    H2 Blockers Protocol Passed    11/22/2018  1:02 AM       Passed - Patient is age 12 or older       Passed - Recent (12 mo) or future (30 days) visit within the authorizing provider's specialty    Patient had office visit in the last 12 months or has a visit in the next 30 days with authorizing provider or within the authorizing provider's specialty.  See \"Patient Info\" tab in inbasket, or \"Choose Columns\" in Meds & Orders section of the refill encounter.              "

## 2018-11-23 NOTE — TELEPHONE ENCOUNTER
"Ranitidine  Last Written Prescription Date:  09/18/2017  Last Fill Quantity: 90,  # refills: 1   Last office visit: 06/26/2018 with prescribing provider:  rFedo   Future Office Visit:  None  Prescription [place] per [facility] [place].    Requested Prescriptions   Pending Prescriptions Disp Refills     ranitidine (ZANTAC) 300 MG tablet [Pharmacy Med Name: RANITIDINE HCL 300MG TABS] 90 tablet 1     Sig: TAKE ONE TABLET BY MOUTH AT BEDTIME    H2 Blockers Protocol Passed    11/22/2018  1:02 AM       Passed - Patient is age 12 or older       Passed - Recent (12 mo) or future (30 days) visit within the authorizing provider's specialty    Patient had office visit in the last 12 months or has a visit in the next 30 days with authorizing provider or within the authorizing provider's specialty.  See \"Patient Info\" tab in inbasket, or \"Choose Columns\" in Meds & Orders section of the refill encounter.        Galilea Casey RN   "

## 2018-11-23 NOTE — TELEPHONE ENCOUNTER
Routing refill request to provider for review/approval because:  A break in medication, patient should have been out of medication 6 months ago    JULISSA Wise, RN  [facility]

## 2018-12-06 NOTE — TELEPHONE ENCOUNTER
Routing refill request to provider for review/approval because:  Labs out of range:  BP  Medication is reported/historical (Claritin)  Patient over 64 years old    MARCO A WiseN, RN  [facility]

## 2018-12-06 NOTE — TELEPHONE ENCOUNTER
"Requested Prescriptions   Pending Prescriptions Disp Refills     oxybutynin (DITROPAN) 5 MG tablet [Pharmacy Med Name: OXYBUTYNIN CHLORIDE 5MG TABS] 60 tablet 5    Last Written Prescription Date:  5/21/18  Last Fill Quantity: 60,  # refills: 5   Last office visit: 10/11/2018 with prescribing provider:  6/26/18   Future Office Visit:     Sig: TAKE ONE TABLET BY MOUTH TWICE A DAY    Muscarinic Antagonists (Urinary Incontinence Agents) Passed    12/6/2018  1:02 AM       Passed - Recent (12 mo) or future (30 days) visit within the authorizing provider's specialty    Patient had office visit in the last 12 months or has a visit in the next 30 days with authorizing provider or within the authorizing provider's specialty.  See \"Patient Info\" tab in inbasket, or \"Choose Columns\" in Meds & Orders section of the refill encounter.             Passed - Medication is Oxybutynin and patient is 5 years of age or older       Passed - Patient does not have a diagnosis of glaucoma on the problem list    If glaucoma diagnosis is new, refer refill to physician.         Passed - Patient is 18 years of age or older        loratadine (CLARITIN) 10 MG tablet [Pharmacy Med Name: LORATADINE 10MG TABS] 30 tablet 6    Last Written Prescription Date:  NA  Last Fill Quantity: NA,  # refills: NA   Last office visit: 10/11/2018 with prescribing provider:  6/26/18   Future Office Visit:     Sig: TAKE ONE TABLET BY MOUTH ONCE DAILY    Antihistamines Protocol Failed    12/6/2018  1:02 AM       Failed - Patient is 3-64 years of age    Apply weight-based dosing for peds patients age 3 - 12 years of age.    Forward request to provider for patients under the age of 3 or over the age of 64.         Passed - Recent (12 mo) or future (30 days) visit within the authorizing provider's specialty    Patient had office visit in the last 12 months or has a visit in the next 30 days with authorizing provider or within the authorizing provider's specialty.  See " "\"Patient Info\" tab in inbasket, or \"Choose Columns\" in Meds & Orders section of the refill encounter.              finasteride (PROSCAR) 5 MG tablet [Pharmacy Med Name: FINASTERIDE 5MG TABS] 30 tablet 5    Last Written Prescription Date:  5/21/18  Last Fill Quantity: 30,  # refills: 5   Last office visit: 10/11/2018 with prescribing provider:  6/26/18   Future Office Visit:     Sig: TAKE ONE TABLET BY MOUTH ONCE DAILY    Alpha Blockers Failed    12/6/2018  1:02 AM       Failed - Blood pressure under 140/90 in past 12 months    BP Readings from Last 3 Encounters:   11/10/18 (!) 173/91   08/08/18 110/72   06/26/18 112/60                Passed - Recent (12 mo) or future (30 days) visit within the authorizing provider's specialty    Patient had office visit in the last 12 months or has a visit in the next 30 days with authorizing provider or within the authorizing provider's specialty.  See \"Patient Info\" tab in inbasket, or \"Choose Columns\" in Meds & Orders section of the refill encounter.             Passed - Patient does not have Tadalafil, Vardenafil, or Sildenafil on their medication list       Passed - Patient is 18 years of age or older        "

## 2018-12-11 NOTE — ED PROVIDER NOTES
History     Chief Complaint   Patient presents with     Wound Check     right hand 4th digit     HPI  Dean Mccarty is a 89 year old male who presents to the emergency department today with complaints of cracks in his fingers on his right hand.  Patient reports that his fourth finger has been getting progressively worse, he denies any pain, fever, drainage, reports that he does have discomfort when he bends his finger.  Patient has been using hydrogen peroxide daily on it.  Patient is not diabetic.  Patient denies any trauma.    Problem List:    Patient Active Problem List    Diagnosis Date Noted     Primary osteoarthritis of both knees 05/16/2018     Priority: Medium     Influenza A 02/21/2017     Priority: Medium     Hypotension, unspecified hypotension type 02/21/2017     Priority: Medium     Abnormal chest x-ray - infiltrate right base 02/21/2017     Priority: Medium     Weakness generalized 02/21/2017     Priority: Medium     Abnormal gait 02/21/2017     Priority: Medium     Possible acute kidney injury (H) - admit Cr 1.37, baseline 1.09 02/21/2017     Priority: Medium     Seasonal allergic rhinitis, unspecified allergic rhinitis trigger 11/16/2016     Priority: Medium     Acute epiglottitis without airway obstruction - possible 02/22/2016     Priority: Medium     Major depressive disorder, recurrent episode, mild (H) 10/27/2015     Priority: Medium     Esophageal reflux 04/21/2014     Priority: Medium     Hyperplasia of prostate 04/07/2014     Priority: Medium     Adjustment disorder with anxiety 08/24/2013     Priority: Medium     Advanced directives, counseling/discussion 05/17/2012     Priority: Medium     Discussed advance care planning with patient; information given to patient to review. 5/17/2012 MMJ         Hypertension goal BP (blood pressure) < 140/80 10/03/2011     Priority: Medium     HYPERLIPIDEMIA LDL GOAL <100 10/31/2010     Priority: Medium     Mild major depression (H) 08/22/2006      Priority: Medium     Chronic ischemic heart disease 2005     Priority: Medium     Problem list name updated by automated process. Provider to review          Past Medical History:    Past Medical History:   Diagnosis Date     Chronic ischemic heart disease, unspecified      Depressive disorder, not elsewhere classified      Unspecified essential hypertension      Unspecified hyperplasia of prostate      Weakness generalized 2017       Past Surgical History:    Past Surgical History:   Procedure Laterality Date     C ANESTH,DX ARTHROSCOPIC PROC KNEE JOINT  05    Left knee, with partial meniscectomy.     HC REMOVE TONSILS/ADENOIDS,<13 Y/O      T & A <12y.o.     HC UGI ENDOSCOPY DIAG W BIOPSY  09       Family History:    Family History   Problem Relation Age of Onset     Diabetes Maternal Grandmother        Social History:  Marital Status:   [2]  Social History     Tobacco Use     Smoking status: Former Smoker     Packs/day: 2.00     Years: 25.00     Pack years: 50.00     Types: Cigarettes     Last attempt to quit: 1970     Years since quittin.9     Smokeless tobacco: Never Used   Substance Use Topics     Alcohol use: No     Alcohol/week: 0.0 oz     Comment: rarely     Drug use: No        Medications:      cephALEXin (KEFLEX) 500 MG capsule   aspirin 81 MG tablet   atorvastatin (LIPITOR) 20 MG tablet   buPROPion (WELLBUTRIN SR) 150 MG 12 hr tablet   erythromycin (ROMYCIN) ophthalmic ointment   finasteride (PROSCAR) 5 MG tablet   HYDROcodone-acetaminophen (NORCO) 5-325 MG per tablet   ibuprofen (ADVIL,MOTRIN) 600 MG tablet   loratadine (CLARITIN) 10 MG tablet   loratadine (CLARITIN) 10 MG tablet   nitroglycerin (NITROSTAT) 0.4 MG SL tablet   omeprazole (PRILOSEC) 20 MG CR capsule   ORDER FOR DME   oxybutynin (DITROPAN) 5 MG tablet   ranitidine (ZANTAC) 300 MG tablet   sertraline (ZOLOFT) 100 MG tablet   tamsulosin (FLOMAX) 0.4 MG capsule   VITAMIN E 400 IU OR CAPS         Review of  Systems   Skin: Positive for wound.   All other systems reviewed and are negative.      Physical Exam   BP: 127/79  Heart Rate: 89  Temp: 97.7  F (36.5  C)  Resp: 16  SpO2: 96 %      Physical Exam   Constitutional: He is oriented to person, place, and time. He appears well-developed and well-nourished. No distress.   HENT:   Head: Normocephalic.   Cardiovascular: Normal rate.   Pulmonary/Chest: Effort normal.   Musculoskeletal: Normal range of motion.   Neurological: He is alert and oriented to person, place, and time.   Skin: Skin is warm and dry. He is not diaphoretic.   Patient has deep crack to fourth digit on right hand right at DIP joint as well as a very small one on his third digit.  There is callused skin surrounding the crack, mild erythema and warmth   Psychiatric: He has a normal mood and affect.       ED Course     Procedures    No results found for this or any previous visit (from the past 24 hour(s)).    Medications - No data to display    Assessments & Plan (with Medical Decision Making)  Cracked skin, fourth and third digit.  It is likely that this has resulted from overly dry skin, patient does not use any lotion or moisturizer.  Also, I did discuss with patient that the ongoing depth of the cracked skin is likely from the daily use of hydrogen peroxide which patient was educated that this oftentimes will eradicate new skin that is trying to grow.  Patient's exam is concerning for an early cellulitis, there is no purulent drainage or abscess formation.  I deroofed the callused skin from around crack in soaked/soften to finger and normal saline, all the skin edges appear healthy.  The crack was filled with Dermabond, bacitracin was placed around the wound but not over the Dermabond and a tube gauze dressing was placed.  I encourage patient to leave this on for the next 24 hours, after that I encouraged him to use lotion twice daily to his hands, specifically, [employer]'s Working Hands, which should  help heal and prevent any further cracking.  With regard to the questionable early cellulitis, I am going to start him on a 10-day course of Keflex to prevent any further infection.  Patient can follow-up in clinic as needed, reasons to return to the emergency department were discussed in detail.  Patient is agreeable to plan of care and discharged in stable condition.     I have reviewed the nursing notes.    I have reviewed the findings, diagnosis, plan and need for follow up with the patient.      Discharge Medication List as of 12/11/2018 10:39 AM      START taking these medications    Details   cephALEXin (KEFLEX) 500 MG capsule Take 1 capsule (500 mg) by mouth 3 times daily for 7 days, Disp-28 capsule, R-0, E-Prescribe             Final diagnoses:   Cracked skin - 4th digit, small area on 3rd of right hand       12/11/2018   [facility] EMERGENCY DEPARTMENT     Felisa Manuel, RENETTA CNP  12/11/18 0086

## 2018-12-11 NOTE — ED AVS SNAPSHOT
[facility]  [address] [place] [address]  Phone:  [phone]  Fax:  [phone]                                    Dean Mccarty   MRN: [id number]    Department:  [facility]   Date of Visit:  12/11/2018           After Visit Summary Signature Page    I have received my discharge instructions, and my questions have been answered. I have discussed any challenges I see with this plan with the nurse or doctor.    ..........................................................................................................................................  Patient/Patient Representative Signature      ..........................................................................................................................................  Patient Representative Print Name and Relationship to Patient    ..................................................               ................................................  Date                                   Time    ..........................................................................................................................................  Reviewed by Signature/Title    ...................................................              ..............................................  Date                                               Time          22EPIC Rev 08/18

## 2018-12-11 NOTE — DISCHARGE INSTRUCTIONS
David,   Leave the dressing on until tomorrow morning.    When you go to the [facility]'s pharmacy to pick your your Keflex (antibiotic), I want you to  a lotion (O'[employer]'s working hands) it is in a green circular container.  You can ask the pharmacist where to find it.    I want you to start using this twice daily to your hands and fingers started today.

## 2018-12-11 NOTE — ED TRIAGE NOTES
Here to have wound on right hand checked. States he developed a crack in knuckle on the 4th digit that has gotten worse over the last 3 weeks.  He now has the starting of another crack to 3rd digit

## 2019-01-01 ENCOUNTER — HOSPITAL ENCOUNTER (INPATIENT)
Facility: CLINIC | Age: 84
LOS: 6 days | DRG: 956 | End: 2019-01-19
Attending: EMERGENCY MEDICINE | Admitting: HOSPITALIST
Payer: MEDICARE

## 2019-01-01 ENCOUNTER — APPOINTMENT (OUTPATIENT)
Dept: GENERAL RADIOLOGY | Facility: CLINIC | Age: 84
DRG: 956 | End: 2019-01-01
Payer: MEDICARE

## 2019-01-01 ENCOUNTER — APPOINTMENT (OUTPATIENT)
Dept: OCCUPATIONAL THERAPY | Facility: CLINIC | Age: 84
DRG: 956 | End: 2019-01-01
Payer: MEDICARE

## 2019-01-01 ENCOUNTER — ANESTHESIA EVENT (OUTPATIENT)
Dept: SURGERY | Facility: CLINIC | Age: 84
DRG: 956 | End: 2019-01-01
Payer: MEDICARE

## 2019-01-01 ENCOUNTER — APPOINTMENT (OUTPATIENT)
Dept: PHYSICAL THERAPY | Facility: CLINIC | Age: 84
DRG: 956 | End: 2019-01-01
Payer: MEDICARE

## 2019-01-01 ENCOUNTER — ANESTHESIA (OUTPATIENT)
Dept: SURGERY | Facility: CLINIC | Age: 84
DRG: 956 | End: 2019-01-01
Payer: MEDICARE

## 2019-01-01 ENCOUNTER — APPOINTMENT (OUTPATIENT)
Dept: CT IMAGING | Facility: CLINIC | Age: 84
DRG: 956 | End: 2019-01-01
Payer: MEDICARE

## 2019-01-01 VITALS
DIASTOLIC BLOOD PRESSURE: 41 MMHG | RESPIRATION RATE: 25 BRPM | WEIGHT: 218.92 LBS | BODY MASS INDEX: 29.65 KG/M2 | OXYGEN SATURATION: 92 % | SYSTOLIC BLOOD PRESSURE: 59 MMHG | HEIGHT: 72 IN | HEART RATE: 93 BPM | TEMPERATURE: 102.4 F

## 2019-01-01 DIAGNOSIS — S72.141A CLOSED DISPLACED INTERTROCHANTERIC FRACTURE OF RIGHT FEMUR, INITIAL ENCOUNTER (H): ICD-10-CM

## 2019-01-01 DIAGNOSIS — S72.001A HIP FRACTURE, RIGHT, CLOSED, INITIAL ENCOUNTER (H): ICD-10-CM

## 2019-01-01 DIAGNOSIS — Z78.9 DEEP VEIN THROMBOSIS (DVT) PROPHYLAXIS PRESCRIBED AT DISCHARGE: Primary | ICD-10-CM
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
## 2019-01-01 LAB
ALBUMIN SERPL-MCNC: 3.6 G/DL (ref 3.4–5)
ALBUMIN UR-MCNC: NEGATIVE MG/DL
ALP SERPL-CCNC: 98 U/L (ref 40–150)
ALT SERPL W P-5'-P-CCNC: 18 U/L (ref 0–70)
ANION GAP SERPL CALCULATED.3IONS-SCNC: 10 MMOL/L (ref 3–14)
ANION GAP SERPL CALCULATED.3IONS-SCNC: 11 MMOL/L (ref 3–14)
ANION GAP SERPL CALCULATED.3IONS-SCNC: 6 MMOL/L (ref 3–14)
ANION GAP SERPL CALCULATED.3IONS-SCNC: 8 MMOL/L (ref 3–14)
APPEARANCE UR: CLEAR
AST SERPL W P-5'-P-CCNC: 18 U/L (ref 0–45)
BASE DEFICIT BLDA-SCNC: 11.5 MMOL/L
BASE DEFICIT BLDA-SCNC: 2.3 MMOL/L
BASOPHILS # BLD AUTO: 0 10E9/L (ref 0–0.2)
BASOPHILS NFR BLD AUTO: 0.1 %
BILIRUB SERPL-MCNC: 0.3 MG/DL (ref 0.2–1.3)
BILIRUB UR QL STRIP: NEGATIVE
BUN SERPL-MCNC: 10 MG/DL (ref 7–30)
BUN SERPL-MCNC: 12 MG/DL (ref 7–30)
BUN SERPL-MCNC: 15 MG/DL (ref 7–30)
BUN SERPL-MCNC: 18 MG/DL (ref 7–30)
BUN SERPL-MCNC: 18 MG/DL (ref 7–30)
BUN SERPL-MCNC: 23 MG/DL (ref 7–30)
CALCIUM SERPL-MCNC: 7.8 MG/DL (ref 8.5–10.1)
CALCIUM SERPL-MCNC: 7.9 MG/DL (ref 8.5–10.1)
CALCIUM SERPL-MCNC: 8.4 MG/DL (ref 8.5–10.1)
CALCIUM SERPL-MCNC: 8.4 MG/DL (ref 8.5–10.1)
CALCIUM SERPL-MCNC: 8.6 MG/DL (ref 8.5–10.1)
CALCIUM SERPL-MCNC: 8.9 MG/DL (ref 8.5–10.1)
CHLORIDE SERPL-SCNC: 105 MMOL/L (ref 94–109)
CHLORIDE SERPL-SCNC: 106 MMOL/L (ref 94–109)
CHLORIDE SERPL-SCNC: 107 MMOL/L (ref 94–109)
CHLORIDE SERPL-SCNC: 108 MMOL/L (ref 94–109)
CHLORIDE SERPL-SCNC: 110 MMOL/L (ref 94–109)
CHLORIDE SERPL-SCNC: 112 MMOL/L (ref 94–109)
CK SERPL-CCNC: 1240 U/L (ref 30–300)
CK SERPL-CCNC: 1251 U/L (ref 30–300)
CK SERPL-CCNC: 1305 U/L (ref 30–300)
CK SERPL-CCNC: 1489 U/L (ref 30–300)
CK SERPL-CCNC: 499 U/L (ref 30–300)
CK SERPL-CCNC: 937 U/L (ref 30–300)
CO2 SERPL-SCNC: 23 MMOL/L (ref 20–32)
CO2 SERPL-SCNC: 23 MMOL/L (ref 20–32)
CO2 SERPL-SCNC: 24 MMOL/L (ref 20–32)
CO2 SERPL-SCNC: 25 MMOL/L (ref 20–32)
CO2 SERPL-SCNC: 25 MMOL/L (ref 20–32)
CO2 SERPL-SCNC: 27 MMOL/L (ref 20–32)
COLOR UR AUTO: YELLOW
CREAT SERPL-MCNC: 0.81 MG/DL (ref 0.66–1.25)
CREAT SERPL-MCNC: 0.85 MG/DL (ref 0.66–1.25)
CREAT SERPL-MCNC: 0.97 MG/DL (ref 0.66–1.25)
CREAT SERPL-MCNC: 1.09 MG/DL (ref 0.66–1.25)
CREAT SERPL-MCNC: 1.1 MG/DL (ref 0.66–1.25)
CREAT SERPL-MCNC: 1.35 MG/DL (ref 0.66–1.25)
CRP SERPL-MCNC: 175 MG/L (ref 0–8)
DIFFERENTIAL METHOD BLD: ABNORMAL
EOSINOPHIL NFR BLD AUTO: 0.3 %
ERYTHROCYTE [DISTWIDTH] IN BLOOD BY AUTOMATED COUNT: 12.5 % (ref 10–15)
ERYTHROCYTE [DISTWIDTH] IN BLOOD BY AUTOMATED COUNT: 13 % (ref 10–15)
ERYTHROCYTE [DISTWIDTH] IN BLOOD BY AUTOMATED COUNT: 13.2 % (ref 10–15)
ERYTHROCYTE [DISTWIDTH] IN BLOOD BY AUTOMATED COUNT: 13.2 % (ref 10–15)
ERYTHROCYTE [DISTWIDTH] IN BLOOD BY AUTOMATED COUNT: 13.3 % (ref 10–15)
FLUAV+FLUBV AG SPEC QL: NEGATIVE
FLUAV+FLUBV AG SPEC QL: NEGATIVE
GASTROCULT GAST QL: POSITIVE
GFR SERPL CREATININE-BSD FRML MDRD: 46 ML/MIN/{1.73_M2}
GFR SERPL CREATININE-BSD FRML MDRD: 59 ML/MIN/{1.73_M2}
GFR SERPL CREATININE-BSD FRML MDRD: 59 ML/MIN/{1.73_M2}
GFR SERPL CREATININE-BSD FRML MDRD: 69 ML/MIN/{1.73_M2}
GFR SERPL CREATININE-BSD FRML MDRD: 76 ML/MIN/{1.73_M2}
GFR SERPL CREATININE-BSD FRML MDRD: 78 ML/MIN/{1.73_M2}
GLUCOSE BLDC GLUCOMTR-MCNC: 153 MG/DL (ref 70–99)
GLUCOSE SERPL-MCNC: 105 MG/DL (ref 70–99)
GLUCOSE SERPL-MCNC: 110 MG/DL (ref 70–99)
GLUCOSE SERPL-MCNC: 112 MG/DL (ref 70–99)
GLUCOSE SERPL-MCNC: 114 MG/DL (ref 70–99)
GLUCOSE SERPL-MCNC: 117 MG/DL (ref 70–99)
GLUCOSE SERPL-MCNC: 128 MG/DL (ref 70–99)
GLUCOSE SERPL-MCNC: 128 MG/DL (ref 70–99)
GLUCOSE SERPL-MCNC: 136 MG/DL (ref 70–99)
GLUCOSE UR STRIP-MCNC: NEGATIVE MG/DL
HCO3 BLD-SCNC: 15 MMOL/L (ref 21–28)
HCO3 BLD-SCNC: 22 MMOL/L (ref 21–28)
HCT VFR BLD AUTO: 25.2 % (ref 40–53)
HCT VFR BLD AUTO: 25.3 % (ref 40–53)
HCT VFR BLD AUTO: 25.5 % (ref 40–53)
HCT VFR BLD AUTO: 32.9 % (ref 40–53)
HCT VFR BLD AUTO: 40.4 % (ref 40–53)
HGB BLD-MCNC: 10.9 G/DL (ref 13.3–17.7)
HGB BLD-MCNC: 13.5 G/DL (ref 13.3–17.7)
HGB BLD-MCNC: 8 G/DL (ref 13.3–17.7)
HGB BLD-MCNC: 8.2 G/DL (ref 13.3–17.7)
HGB BLD-MCNC: 8.6 G/DL (ref 13.3–17.7)
HGB BLD-MCNC: 8.7 G/DL (ref 13.3–17.7)
HGB BLD-MCNC: 9.5 G/DL (ref 13.3–17.7)
HGB UR QL STRIP: ABNORMAL
IMM GRANULOCYTES # BLD: 0.1 10E9/L (ref 0–0.4)
IMM GRANULOCYTES NFR BLD: 0.6 %
KETONES UR STRIP-MCNC: NEGATIVE MG/DL
LACTATE BLD-SCNC: 1.1 MMOL/L (ref 0.7–2)
LACTATE BLD-SCNC: 1.2 MMOL/L (ref 0.7–2)
LACTATE BLD-SCNC: 1.9 MMOL/L (ref 0.7–2)
LACTATE BLD-SCNC: 20 MMOL/L (ref 0.7–2)
LEUKOCYTE ESTERASE UR QL STRIP: NEGATIVE
LYMPHOCYTES # BLD AUTO: 1.2 10E9/L (ref 0.8–5.3)
LYMPHOCYTES NFR BLD AUTO: 7.9 %
MCH RBC QN AUTO: 29.5 PG (ref 26.5–33)
MCH RBC QN AUTO: 29.7 PG (ref 26.5–33)
MCH RBC QN AUTO: 29.7 PG (ref 26.5–33)
MCH RBC QN AUTO: 29.9 PG (ref 26.5–33)
MCH RBC QN AUTO: 29.9 PG (ref 26.5–33)
MCHC RBC AUTO-ENTMCNC: 32.5 G/DL (ref 31.5–36.5)
MCHC RBC AUTO-ENTMCNC: 33.1 G/DL (ref 31.5–36.5)
MCHC RBC AUTO-ENTMCNC: 33.4 G/DL (ref 31.5–36.5)
MCHC RBC AUTO-ENTMCNC: 34 G/DL (ref 31.5–36.5)
MCHC RBC AUTO-ENTMCNC: 34.1 G/DL (ref 31.5–36.5)
MCV RBC AUTO: 87 FL (ref 78–100)
MCV RBC AUTO: 87 FL (ref 78–100)
MCV RBC AUTO: 90 FL (ref 78–100)
MCV RBC AUTO: 90 FL (ref 78–100)
MCV RBC AUTO: 92 FL (ref 78–100)
MONOCYTES # BLD AUTO: 1.2 10E9/L (ref 0–1.3)
MONOCYTES NFR BLD AUTO: 7.8 %
MUCOUS THREADS #/AREA URNS LPF: PRESENT /LPF
NEUTROPHILS # BLD AUTO: 13.1 10E9/L (ref 1.6–8.3)
NEUTROPHILS NFR BLD AUTO: 83.3 %
NITRATE UR QL: NEGATIVE
NRBC # BLD AUTO: 0 10*3/UL
NRBC BLD AUTO-RTO: 0 /100
O2/TOTAL GAS SETTING VFR VENT: 21 %
O2/TOTAL GAS SETTING VFR VENT: 50 %
PCO2 BLD: 34 MM HG (ref 35–45)
PCO2 BLD: 36 MM HG (ref 35–45)
PH BLD: 7.25 PH (ref 7.35–7.45)
PH BLD: 7.4 PH (ref 7.35–7.45)
PH UR STRIP: 5 PH (ref 5–7)
PLATELET # BLD AUTO: 132 10E9/L (ref 150–450)
PLATELET # BLD AUTO: 178 10E9/L (ref 150–450)
PLATELET # BLD AUTO: 204 10E9/L (ref 150–450)
PLATELET # BLD AUTO: 227 10E9/L (ref 150–450)
PLATELET # BLD AUTO: 320 10E9/L (ref 150–450)
PO2 BLD: 45 MM HG (ref 80–105)
PO2 BLD: 65 MM HG (ref 80–105)
POTASSIUM SERPL-SCNC: 3.3 MMOL/L (ref 3.4–5.3)
POTASSIUM SERPL-SCNC: 3.7 MMOL/L (ref 3.4–5.3)
POTASSIUM SERPL-SCNC: 3.7 MMOL/L (ref 3.4–5.3)
POTASSIUM SERPL-SCNC: 3.8 MMOL/L (ref 3.4–5.3)
POTASSIUM SERPL-SCNC: 3.8 MMOL/L (ref 3.4–5.3)
POTASSIUM SERPL-SCNC: 3.9 MMOL/L (ref 3.4–5.3)
POTASSIUM SERPL-SCNC: 4.3 MMOL/L (ref 3.4–5.3)
PROT SERPL-MCNC: 6.9 G/DL (ref 6.8–8.8)
RBC # BLD AUTO: 2.74 10E12/L (ref 4.4–5.9)
RBC # BLD AUTO: 2.92 10E12/L (ref 4.4–5.9)
RBC # BLD AUTO: 2.93 10E12/L (ref 4.4–5.9)
RBC # BLD AUTO: 3.67 10E12/L (ref 4.4–5.9)
RBC # BLD AUTO: 4.51 10E12/L (ref 4.4–5.9)
RBC #/AREA URNS AUTO: 12 /HPF (ref 0–2)
RSV AG SPEC QL: NEGATIVE
SODIUM SERPL-SCNC: 137 MMOL/L (ref 133–144)
SODIUM SERPL-SCNC: 138 MMOL/L (ref 133–144)
SODIUM SERPL-SCNC: 141 MMOL/L (ref 133–144)
SODIUM SERPL-SCNC: 141 MMOL/L (ref 133–144)
SODIUM SERPL-SCNC: 142 MMOL/L (ref 133–144)
SODIUM SERPL-SCNC: 143 MMOL/L (ref 133–144)
SOURCE: ABNORMAL
SP GR UR STRIP: 1.01 (ref 1–1.03)
SPECIMEN SOURCE: NORMAL
SPECIMEN SOURCE: NORMAL
SQUAMOUS #/AREA URNS AUTO: <1 /HPF (ref 0–1)
TROPONIN I SERPL-MCNC: 0.03 UG/L (ref 0–0.04)
UROBILINOGEN UR STRIP-MCNC: 0 MG/DL (ref 0–2)
WBC # BLD AUTO: 10.2 10E9/L (ref 4–11)
WBC # BLD AUTO: 15.7 10E9/L (ref 4–11)
WBC # BLD AUTO: 7.9 10E9/L (ref 4–11)
WBC # BLD AUTO: 9.5 10E9/L (ref 4–11)
WBC # BLD AUTO: 9.9 10E9/L (ref 4–11)
WBC #/AREA URNS AUTO: 1 /HPF (ref 0–5)

## 2019-01-01 PROCEDURE — 25000132 ZZH RX MED GY IP 250 OP 250 PS 637: Mod: GY | Performed by: HOSPITALIST

## 2019-01-01 PROCEDURE — 73502 X-RAY EXAM HIP UNI 2-3 VIEWS: CPT | Mod: TC

## 2019-01-01 PROCEDURE — 27245 TREAT THIGH FRACTURE: CPT | Mod: RT | Performed by: ORTHOPAEDIC SURGERY

## 2019-01-01 PROCEDURE — 25000128 H RX IP 250 OP 636: Performed by: FAMILY MEDICINE

## 2019-01-01 PROCEDURE — 97535 SELF CARE MNGMENT TRAINING: CPT | Mod: GO

## 2019-01-01 PROCEDURE — 85018 HEMOGLOBIN: CPT | Performed by: ORTHOPAEDIC SURGERY

## 2019-01-01 PROCEDURE — 97161 PT EVAL LOW COMPLEX 20 MIN: CPT | Mod: GP | Performed by: PHYSICAL THERAPIST

## 2019-01-01 PROCEDURE — 99233 SBSQ HOSP IP/OBS HIGH 50: CPT | Performed by: INTERNAL MEDICINE

## 2019-01-01 PROCEDURE — A9270 NON-COVERED ITEM OR SERVICE: HCPCS | Mod: GY | Performed by: ORTHOPAEDIC SURGERY

## 2019-01-01 PROCEDURE — 12000000 ZZH R&B MED SURG/OB

## 2019-01-01 PROCEDURE — 40000305 ZZH STATISTIC PRE PROC ASSESS I: Performed by: ORTHOPAEDIC SURGERY

## 2019-01-01 PROCEDURE — 99239 HOSP IP/OBS DSCHRG MGMT >30: CPT | Performed by: PEDIATRICS

## 2019-01-01 PROCEDURE — 97530 THERAPEUTIC ACTIVITIES: CPT | Mod: GP | Performed by: PHYSICAL THERAPIST

## 2019-01-01 PROCEDURE — 36415 COLL VENOUS BLD VENIPUNCTURE: CPT | Performed by: ORTHOPAEDIC SURGERY

## 2019-01-01 PROCEDURE — 71045 X-RAY EXAM CHEST 1 VIEW: CPT | Mod: TC

## 2019-01-01 PROCEDURE — 25000132 ZZH RX MED GY IP 250 OP 250 PS 637: Mod: GY | Performed by: ORTHOPAEDIC SURGERY

## 2019-01-01 PROCEDURE — 87804 INFLUENZA ASSAY W/OPTIC: CPT | Performed by: PHYSICIAN ASSISTANT

## 2019-01-01 PROCEDURE — A9270 NON-COVERED ITEM OR SERVICE: HCPCS | Mod: GY | Performed by: HOSPITALIST

## 2019-01-01 PROCEDURE — 82550 ASSAY OF CK (CPK): CPT | Performed by: FAMILY MEDICINE

## 2019-01-01 PROCEDURE — 27245 TREAT THIGH FRACTURE: CPT | Mod: AS | Performed by: PHYSICIAN ASSISTANT

## 2019-01-01 PROCEDURE — 25000125 ZZHC RX 250: Performed by: FAMILY MEDICINE

## 2019-01-01 PROCEDURE — 99233 SBSQ HOSP IP/OBS HIGH 50: CPT | Performed by: NURSE PRACTITIONER

## 2019-01-01 PROCEDURE — 40000193 ZZH STATISTIC PT WARD VISIT: Performed by: PHYSICAL THERAPIST

## 2019-01-01 PROCEDURE — 27211024 ZZHC OR SUPPLY OTHER OPNP: Performed by: ORTHOPAEDIC SURGERY

## 2019-01-01 PROCEDURE — 25000128 H RX IP 250 OP 636: Performed by: ORTHOPAEDIC SURGERY

## 2019-01-01 PROCEDURE — 40000133 ZZH STATISTIC OT WARD VISIT

## 2019-01-01 PROCEDURE — 99207 ZZC MOONLIGHTING INDICATOR: CPT | Performed by: INTERNAL MEDICINE

## 2019-01-01 PROCEDURE — 97166 OT EVAL MOD COMPLEX 45 MIN: CPT | Mod: GO

## 2019-01-01 PROCEDURE — 25000132 ZZH RX MED GY IP 250 OP 250 PS 637: Mod: GY | Performed by: NURSE PRACTITIONER

## 2019-01-01 PROCEDURE — 40000277 XR SURGERY CARM FLUORO LESS THAN 5 MIN W STILLS

## 2019-01-01 PROCEDURE — A9270 NON-COVERED ITEM OR SERVICE: HCPCS | Mod: GY | Performed by: FAMILY MEDICINE

## 2019-01-01 PROCEDURE — 25000128 H RX IP 250 OP 636: Performed by: NURSE PRACTITIONER

## 2019-01-01 PROCEDURE — 25000132 ZZH RX MED GY IP 250 OP 250 PS 637: Mod: GY | Performed by: FAMILY MEDICINE

## 2019-01-01 PROCEDURE — 99207 ZZC CDG-MDM COMPONENT: MEETS LOW - DOWN CODED: CPT | Performed by: NURSE PRACTITIONER

## 2019-01-01 PROCEDURE — 82947 ASSAY GLUCOSE BLOOD QUANT: CPT | Performed by: ORTHOPAEDIC SURGERY

## 2019-01-01 PROCEDURE — 93010 ELECTROCARDIOGRAM REPORT: CPT | Mod: Z6 | Performed by: EMERGENCY MEDICINE

## 2019-01-01 PROCEDURE — 40000275 ZZH STATISTIC RCP TIME EA 10 MIN

## 2019-01-01 PROCEDURE — 97110 THERAPEUTIC EXERCISES: CPT | Mod: GP | Performed by: PHYSICAL THERAPIST

## 2019-01-01 PROCEDURE — 86140 C-REACTIVE PROTEIN: CPT | Performed by: NURSE PRACTITIONER

## 2019-01-01 PROCEDURE — 25000128 H RX IP 250 OP 636: Performed by: HOSPITALIST

## 2019-01-01 PROCEDURE — 00000146 ZZHCL STATISTIC GLUCOSE BY METER IP

## 2019-01-01 PROCEDURE — 25000128 H RX IP 250 OP 636: Performed by: EMERGENCY MEDICINE

## 2019-01-01 PROCEDURE — 85027 COMPLETE CBC AUTOMATED: CPT | Performed by: NURSE PRACTITIONER

## 2019-01-01 PROCEDURE — 93005 ELECTROCARDIOGRAM TRACING: CPT

## 2019-01-01 PROCEDURE — 25000128 H RX IP 250 OP 636: Performed by: INTERNAL MEDICINE

## 2019-01-01 PROCEDURE — 25000128 H RX IP 250 OP 636: Performed by: NURSE ANESTHETIST, CERTIFIED REGISTERED

## 2019-01-01 PROCEDURE — A9270 NON-COVERED ITEM OR SERVICE: HCPCS | Mod: GY | Performed by: NURSE PRACTITIONER

## 2019-01-01 PROCEDURE — 94660 CPAP INITIATION&MGMT: CPT

## 2019-01-01 PROCEDURE — 99285 EMERGENCY DEPT VISIT HI MDM: CPT | Mod: 25 | Performed by: EMERGENCY MEDICINE

## 2019-01-01 PROCEDURE — 94640 AIRWAY INHALATION TREATMENT: CPT

## 2019-01-01 PROCEDURE — 80048 BASIC METABOLIC PNL TOTAL CA: CPT | Performed by: NURSE PRACTITIONER

## 2019-01-01 PROCEDURE — 81001 URINALYSIS AUTO W/SCOPE: CPT | Performed by: INTERNAL MEDICINE

## 2019-01-01 PROCEDURE — 83605 ASSAY OF LACTIC ACID: CPT | Performed by: NURSE PRACTITIONER

## 2019-01-01 PROCEDURE — 25000128 H RX IP 250 OP 636: Performed by: PEDIATRICS

## 2019-01-01 PROCEDURE — 36000065 ZZH SURGERY LEVEL 4 W FLUORO 1ST 30 MIN: Performed by: ORTHOPAEDIC SURGERY

## 2019-01-01 PROCEDURE — 97530 THERAPEUTIC ACTIVITIES: CPT | Mod: GO

## 2019-01-01 PROCEDURE — 36415 COLL VENOUS BLD VENIPUNCTURE: CPT | Performed by: HOSPITALIST

## 2019-01-01 PROCEDURE — 87807 RSV ASSAY W/OPTIC: CPT | Performed by: PHYSICIAN ASSISTANT

## 2019-01-01 PROCEDURE — 25000125 ZZHC RX 250: Performed by: NURSE ANESTHETIST, CERTIFIED REGISTERED

## 2019-01-01 PROCEDURE — 82550 ASSAY OF CK (CPK): CPT | Performed by: NURSE PRACTITIONER

## 2019-01-01 PROCEDURE — 83605 ASSAY OF LACTIC ACID: CPT | Performed by: PEDIATRICS

## 2019-01-01 PROCEDURE — 96374 THER/PROPH/DIAG INJ IV PUSH: CPT | Performed by: EMERGENCY MEDICINE

## 2019-01-01 PROCEDURE — 82271 OCCULT BLOOD OTHER SOURCES: CPT | Performed by: PEDIATRICS

## 2019-01-01 PROCEDURE — 80048 BASIC METABOLIC PNL TOTAL CA: CPT | Performed by: HOSPITALIST

## 2019-01-01 PROCEDURE — 87040 BLOOD CULTURE FOR BACTERIA: CPT | Performed by: PEDIATRICS

## 2019-01-01 PROCEDURE — 36000063 ZZH SURGERY LEVEL 4 EA 15 ADDTL MIN: Performed by: ORTHOPAEDIC SURGERY

## 2019-01-01 PROCEDURE — 36415 COLL VENOUS BLD VENIPUNCTURE: CPT | Performed by: NURSE PRACTITIONER

## 2019-01-01 PROCEDURE — 36415 COLL VENOUS BLD VENIPUNCTURE: CPT | Performed by: PEDIATRICS

## 2019-01-01 PROCEDURE — 83605 ASSAY OF LACTIC ACID: CPT | Performed by: FAMILY MEDICINE

## 2019-01-01 PROCEDURE — 36415 COLL VENOUS BLD VENIPUNCTURE: CPT | Performed by: FAMILY MEDICINE

## 2019-01-01 PROCEDURE — 71000014 ZZH RECOVERY PHASE 1 LEVEL 2 FIRST HR: Performed by: ORTHOPAEDIC SURGERY

## 2019-01-01 PROCEDURE — 80048 BASIC METABOLIC PNL TOTAL CA: CPT | Performed by: FAMILY MEDICINE

## 2019-01-01 PROCEDURE — C1713 ANCHOR/SCREW BN/BN,TIS/BN: HCPCS | Performed by: ORTHOPAEDIC SURGERY

## 2019-01-01 PROCEDURE — 70450 CT HEAD/BRAIN W/O DYE: CPT

## 2019-01-01 PROCEDURE — 37000009 ZZH ANESTHESIA TECHNICAL FEE, EACH ADDTL 15 MIN: Performed by: ORTHOPAEDIC SURGERY

## 2019-01-01 PROCEDURE — 99232 SBSQ HOSP IP/OBS MODERATE 35: CPT | Performed by: NURSE PRACTITIONER

## 2019-01-01 PROCEDURE — 82565 ASSAY OF CREATININE: CPT | Performed by: ORTHOPAEDIC SURGERY

## 2019-01-01 PROCEDURE — 99233 SBSQ HOSP IP/OBS HIGH 50: CPT | Performed by: FAMILY MEDICINE

## 2019-01-01 PROCEDURE — 82550 ASSAY OF CK (CPK): CPT | Performed by: ORTHOPAEDIC SURGERY

## 2019-01-01 PROCEDURE — 96361 HYDRATE IV INFUSION ADD-ON: CPT | Performed by: EMERGENCY MEDICINE

## 2019-01-01 PROCEDURE — 83605 ASSAY OF LACTIC ACID: CPT | Performed by: HOSPITALIST

## 2019-01-01 PROCEDURE — 82803 BLOOD GASES ANY COMBINATION: CPT | Performed by: PEDIATRICS

## 2019-01-01 PROCEDURE — 40000893 ZZH STATISTIC PT IP EVAL DEFER: Performed by: PHYSICAL THERAPIST

## 2019-01-01 PROCEDURE — 93005 ELECTROCARDIOGRAM TRACING: CPT | Performed by: EMERGENCY MEDICINE

## 2019-01-01 PROCEDURE — 40000894 ZZH STATISTIC OT IP EVAL DEFER: Performed by: OCCUPATIONAL THERAPIST

## 2019-01-01 PROCEDURE — 84484 ASSAY OF TROPONIN QUANT: CPT | Performed by: INTERNAL MEDICINE

## 2019-01-01 PROCEDURE — 25000566 ZZH SEVOFLURANE, EA 15 MIN: Performed by: ORTHOPAEDIC SURGERY

## 2019-01-01 PROCEDURE — 84132 ASSAY OF SERUM POTASSIUM: CPT | Performed by: FAMILY MEDICINE

## 2019-01-01 PROCEDURE — 99221 1ST HOSP IP/OBS SF/LOW 40: CPT | Mod: 57 | Performed by: ORTHOPAEDIC SURGERY

## 2019-01-01 PROCEDURE — 85027 COMPLETE CBC AUTOMATED: CPT | Performed by: HOSPITALIST

## 2019-01-01 PROCEDURE — 85018 HEMOGLOBIN: CPT | Performed by: PEDIATRICS

## 2019-01-01 PROCEDURE — 99222 1ST HOSP IP/OBS MODERATE 55: CPT | Mod: AI | Performed by: HOSPITALIST

## 2019-01-01 PROCEDURE — 37000008 ZZH ANESTHESIA TECHNICAL FEE, 1ST 30 MIN: Performed by: ORTHOPAEDIC SURGERY

## 2019-01-01 PROCEDURE — 85025 COMPLETE CBC W/AUTO DIFF WBC: CPT | Performed by: EMERGENCY MEDICINE

## 2019-01-01 PROCEDURE — 36415 COLL VENOUS BLD VENIPUNCTURE: CPT | Performed by: INTERNAL MEDICINE

## 2019-01-01 PROCEDURE — 80053 COMPREHEN METABOLIC PANEL: CPT | Performed by: EMERGENCY MEDICINE

## 2019-01-01 PROCEDURE — 71045 X-RAY EXAM CHEST 1 VIEW: CPT | Mod: 77,TC

## 2019-01-01 PROCEDURE — 96376 TX/PRO/DX INJ SAME DRUG ADON: CPT | Performed by: EMERGENCY MEDICINE

## 2019-01-01 PROCEDURE — 99291 CRITICAL CARE FIRST HOUR: CPT | Performed by: PEDIATRICS

## 2019-01-01 PROCEDURE — 0QS606Z REPOSITION RIGHT UPPER FEMUR WITH INTRAMEDULLARY INTERNAL FIXATION DEVICE, OPEN APPROACH: ICD-10-PCS | Performed by: ORTHOPAEDIC SURGERY

## 2019-01-01 PROCEDURE — 27210794 ZZH OR GENERAL SUPPLY STERILE: Performed by: ORTHOPAEDIC SURGERY

## 2019-01-01 PROCEDURE — 97162 PT EVAL MOD COMPLEX 30 MIN: CPT | Mod: GP | Performed by: PHYSICAL THERAPIST

## 2019-01-01 DEVICE — IMPLANTABLE DEVICE: Type: IMPLANTABLE DEVICE | Site: HIP | Status: FUNCTIONAL

## 2019-01-01 RX ORDER — ONDANSETRON 2 MG/ML
INJECTION INTRAMUSCULAR; INTRAVENOUS PRN
Status: DISCONTINUED | OUTPATIENT
Start: 2019-01-01 | End: 2019-01-01

## 2019-01-01 RX ORDER — OXYBUTYNIN CHLORIDE 5 MG/1
5 TABLET ORAL 2 TIMES DAILY
Status: DISCONTINUED | OUTPATIENT
Start: 2019-01-01 | End: 2019-01-20 | Stop reason: HOSPADM

## 2019-01-01 RX ORDER — METOCLOPRAMIDE HYDROCHLORIDE 5 MG/ML
5 INJECTION INTRAMUSCULAR; INTRAVENOUS EVERY 6 HOURS PRN
Status: DISCONTINUED | OUTPATIENT
Start: 2019-01-01 | End: 2019-01-01

## 2019-01-01 RX ORDER — ALBUTEROL SULFATE 0.83 MG/ML
2.5 SOLUTION RESPIRATORY (INHALATION) EVERY 4 HOURS PRN
Status: DISCONTINUED | OUTPATIENT
Start: 2019-01-01 | End: 2019-01-01

## 2019-01-01 RX ORDER — OXYCODONE HYDROCHLORIDE 5 MG/1
5 TABLET ORAL EVERY 4 HOURS PRN
Status: DISCONTINUED | OUTPATIENT
Start: 2019-01-01 | End: 2019-01-01

## 2019-01-01 RX ORDER — CEFAZOLIN SODIUM 1 G/50ML
1 SOLUTION INTRAVENOUS SEE ADMIN INSTRUCTIONS
Status: DISCONTINUED | OUTPATIENT
Start: 2019-01-01 | End: 2019-01-01 | Stop reason: HOSPADM

## 2019-01-01 RX ORDER — FINASTERIDE 5 MG/1
5 TABLET, FILM COATED ORAL DAILY
Status: DISCONTINUED | OUTPATIENT
Start: 2019-01-01 | End: 2019-01-20 | Stop reason: HOSPADM

## 2019-01-01 RX ORDER — METOCLOPRAMIDE 5 MG/1
5 TABLET ORAL EVERY 6 HOURS PRN
Status: DISCONTINUED | OUTPATIENT
Start: 2019-01-01 | End: 2019-01-01

## 2019-01-01 RX ORDER — ONDANSETRON 4 MG/1
4 TABLET, ORALLY DISINTEGRATING ORAL EVERY 30 MIN PRN
Status: DISCONTINUED | OUTPATIENT
Start: 2019-01-01 | End: 2019-01-01

## 2019-01-01 RX ORDER — ALBUTEROL SULFATE 0.83 MG/ML
2.5 SOLUTION RESPIRATORY (INHALATION)
Status: DISCONTINUED | OUTPATIENT
Start: 2019-01-01 | End: 2019-01-20 | Stop reason: HOSPADM

## 2019-01-01 RX ORDER — SODIUM CHLORIDE, SODIUM LACTATE, POTASSIUM CHLORIDE, CALCIUM CHLORIDE 600; 310; 30; 20 MG/100ML; MG/100ML; MG/100ML; MG/100ML
INJECTION, SOLUTION INTRAVENOUS CONTINUOUS
Status: DISCONTINUED | OUTPATIENT
Start: 2019-01-01 | End: 2019-01-01

## 2019-01-01 RX ORDER — POTASSIUM CL/LIDO/0.9 % NACL 10MEQ/0.1L
10 INTRAVENOUS SOLUTION, PIGGYBACK (ML) INTRAVENOUS
Status: DISCONTINUED | OUTPATIENT
Start: 2019-01-01 | End: 2019-01-20 | Stop reason: HOSPADM

## 2019-01-01 RX ORDER — LIDOCAINE HYDROCHLORIDE 20 MG/ML
INJECTION, SOLUTION INFILTRATION; PERINEURAL PRN
Status: DISCONTINUED | OUTPATIENT
Start: 2019-01-01 | End: 2019-01-01

## 2019-01-01 RX ORDER — ACETAMINOPHEN 325 MG/1
975 TABLET ORAL EVERY 8 HOURS
Status: DISCONTINUED | OUTPATIENT
Start: 2019-01-01 | End: 2019-01-01

## 2019-01-01 RX ORDER — LIDOCAINE 40 MG/G
CREAM TOPICAL
Status: DISCONTINUED | OUTPATIENT
Start: 2019-01-01 | End: 2019-01-20 | Stop reason: HOSPADM

## 2019-01-01 RX ORDER — ONDANSETRON 4 MG/1
4 TABLET, ORALLY DISINTEGRATING ORAL EVERY 6 HOURS PRN
Status: DISCONTINUED | OUTPATIENT
Start: 2019-01-01 | End: 2019-01-01

## 2019-01-01 RX ORDER — OXYCODONE HYDROCHLORIDE 5 MG/1
5 TABLET ORAL EVERY 4 HOURS PRN
Qty: 40 TABLET | Refills: 0 | Status: SHIPPED | OUTPATIENT
Start: 2019-01-01 | End: 2019-01-01

## 2019-01-01 RX ORDER — ONDANSETRON 4 MG/1
4 TABLET, ORALLY DISINTEGRATING ORAL EVERY 6 HOURS PRN
Status: DISCONTINUED | OUTPATIENT
Start: 2019-01-01 | End: 2019-01-20 | Stop reason: HOSPADM

## 2019-01-01 RX ORDER — SODIUM CHLORIDE 9 MG/ML
INJECTION, SOLUTION INTRAVENOUS CONTINUOUS
Status: DISCONTINUED | OUTPATIENT
Start: 2019-01-01 | End: 2019-01-01

## 2019-01-01 RX ORDER — ALBUTEROL SULFATE 0.83 MG/ML
2.5 SOLUTION RESPIRATORY (INHALATION)
Status: DISCONTINUED | OUTPATIENT
Start: 2019-01-01 | End: 2019-01-01

## 2019-01-01 RX ORDER — HYDROCODONE BITARTRATE AND ACETAMINOPHEN 5; 325 MG/1; MG/1
1-2 TABLET ORAL EVERY 4 HOURS PRN
Status: DISCONTINUED | OUTPATIENT
Start: 2019-01-01 | End: 2019-01-01

## 2019-01-01 RX ORDER — LORAZEPAM 2 MG/ML
1 INJECTION INTRAMUSCULAR ONCE
Status: COMPLETED | OUTPATIENT
Start: 2019-01-01 | End: 2019-01-01

## 2019-01-01 RX ORDER — FENTANYL CITRATE 50 UG/ML
INJECTION, SOLUTION INTRAMUSCULAR; INTRAVENOUS PRN
Status: DISCONTINUED | OUTPATIENT
Start: 2019-01-01 | End: 2019-01-01

## 2019-01-01 RX ORDER — PROPOFOL 10 MG/ML
INJECTION, EMULSION INTRAVENOUS PRN
Status: DISCONTINUED | OUTPATIENT
Start: 2019-01-01 | End: 2019-01-01

## 2019-01-01 RX ORDER — SODIUM CHLORIDE 9 MG/ML
1000 INJECTION, SOLUTION INTRAVENOUS CONTINUOUS
Status: DISCONTINUED | OUTPATIENT
Start: 2019-01-01 | End: 2019-01-01

## 2019-01-01 RX ORDER — HYDROMORPHONE HYDROCHLORIDE 1 MG/ML
.3-.5 INJECTION, SOLUTION INTRAMUSCULAR; INTRAVENOUS; SUBCUTANEOUS EVERY 5 MIN PRN
Status: DISCONTINUED | OUTPATIENT
Start: 2019-01-01 | End: 2019-01-01

## 2019-01-01 RX ORDER — SODIUM CHLORIDE, SODIUM LACTATE, POTASSIUM CHLORIDE, CALCIUM CHLORIDE 600; 310; 30; 20 MG/100ML; MG/100ML; MG/100ML; MG/100ML
INJECTION, SOLUTION INTRAVENOUS CONTINUOUS
Status: DISCONTINUED | OUTPATIENT
Start: 2019-01-01 | End: 2019-01-20 | Stop reason: HOSPADM

## 2019-01-01 RX ORDER — POTASSIUM CHLORIDE 29.8 MG/ML
20 INJECTION INTRAVENOUS
Status: DISCONTINUED | OUTPATIENT
Start: 2019-01-01 | End: 2019-01-20 | Stop reason: HOSPADM

## 2019-01-01 RX ORDER — HYDROMORPHONE HYDROCHLORIDE 1 MG/ML
0.2 INJECTION, SOLUTION INTRAMUSCULAR; INTRAVENOUS; SUBCUTANEOUS
Status: DISCONTINUED | OUTPATIENT
Start: 2019-01-01 | End: 2019-01-01

## 2019-01-01 RX ORDER — ATORVASTATIN CALCIUM 10 MG/1
20 TABLET, FILM COATED ORAL DAILY
Status: DISCONTINUED | OUTPATIENT
Start: 2019-01-01 | End: 2019-01-01

## 2019-01-01 RX ORDER — LIDOCAINE 40 MG/G
CREAM TOPICAL
Status: DISCONTINUED | OUTPATIENT
Start: 2019-01-01 | End: 2019-01-01 | Stop reason: HOSPADM

## 2019-01-01 RX ORDER — ACETAMINOPHEN 325 MG/1
975 TABLET ORAL EVERY 8 HOURS PRN
COMMUNITY
Start: 2019-01-01 | End: 2019-01-01

## 2019-01-01 RX ORDER — FENTANYL CITRATE 50 UG/ML
25-50 INJECTION, SOLUTION INTRAMUSCULAR; INTRAVENOUS
Status: DISCONTINUED | OUTPATIENT
Start: 2019-01-01 | End: 2019-01-01

## 2019-01-01 RX ORDER — NALOXONE HYDROCHLORIDE 0.4 MG/ML
.1-.4 INJECTION, SOLUTION INTRAMUSCULAR; INTRAVENOUS; SUBCUTANEOUS
Status: DISCONTINUED | OUTPATIENT
Start: 2019-01-01 | End: 2019-01-01

## 2019-01-01 RX ORDER — CEFAZOLIN SODIUM 2 G/100ML
2 INJECTION, SOLUTION INTRAVENOUS
Status: COMPLETED | OUTPATIENT
Start: 2019-01-01 | End: 2019-01-01

## 2019-01-01 RX ORDER — POTASSIUM CHLORIDE 1.5 G/1.58G
20-40 POWDER, FOR SOLUTION ORAL
Status: DISCONTINUED | OUTPATIENT
Start: 2019-01-01 | End: 2019-01-20 | Stop reason: HOSPADM

## 2019-01-01 RX ORDER — LORATADINE 10 MG/1
10 TABLET ORAL DAILY
Status: ON HOLD | COMMUNITY
End: 2019-01-01

## 2019-01-01 RX ORDER — SODIUM CHLORIDE, SODIUM LACTATE, POTASSIUM CHLORIDE, CALCIUM CHLORIDE 600; 310; 30; 20 MG/100ML; MG/100ML; MG/100ML; MG/100ML
500 INJECTION, SOLUTION INTRAVENOUS CONTINUOUS
Status: DISCONTINUED | OUTPATIENT
Start: 2019-01-01 | End: 2019-01-01 | Stop reason: HOSPADM

## 2019-01-01 RX ORDER — POTASSIUM CHLORIDE 7.45 MG/ML
10 INJECTION INTRAVENOUS
Status: DISCONTINUED | OUTPATIENT
Start: 2019-01-01 | End: 2019-01-20 | Stop reason: HOSPADM

## 2019-01-01 RX ORDER — HALOPERIDOL 5 MG/ML
2 INJECTION INTRAMUSCULAR EVERY 6 HOURS PRN
Status: DISCONTINUED | OUTPATIENT
Start: 2019-01-01 | End: 2019-01-01

## 2019-01-01 RX ORDER — PROCHLORPERAZINE MALEATE 5 MG
5 TABLET ORAL EVERY 6 HOURS PRN
Status: DISCONTINUED | OUTPATIENT
Start: 2019-01-01 | End: 2019-01-01

## 2019-01-01 RX ORDER — BUPROPION HYDROCHLORIDE 150 MG/1
150 TABLET, FILM COATED, EXTENDED RELEASE ORAL 2 TIMES DAILY
Status: DISCONTINUED | OUTPATIENT
Start: 2019-01-01 | End: 2019-01-01

## 2019-01-01 RX ORDER — ONDANSETRON 2 MG/ML
4 INJECTION INTRAMUSCULAR; INTRAVENOUS EVERY 6 HOURS PRN
Status: DISCONTINUED | OUTPATIENT
Start: 2019-01-01 | End: 2019-01-01

## 2019-01-01 RX ORDER — LIDOCAINE 40 MG/G
CREAM TOPICAL
Status: DISCONTINUED | OUTPATIENT
Start: 2019-01-01 | End: 2019-01-01

## 2019-01-01 RX ORDER — ACETAMINOPHEN 325 MG/1
975 TABLET ORAL EVERY 8 HOURS PRN
Status: DISCONTINUED | OUTPATIENT
Start: 2019-01-01 | End: 2019-01-01

## 2019-01-01 RX ORDER — BENZTROPINE MESYLATE 0.5 MG/1
1-2 TABLET ORAL 3 TIMES DAILY PRN
Status: DISCONTINUED | OUTPATIENT
Start: 2019-01-01 | End: 2019-01-01

## 2019-01-01 RX ORDER — PROCHLORPERAZINE 25 MG
12.5 SUPPOSITORY, RECTAL RECTAL EVERY 12 HOURS PRN
Status: DISCONTINUED | OUTPATIENT
Start: 2019-01-01 | End: 2019-01-01

## 2019-01-01 RX ORDER — KETOROLAC TROMETHAMINE 15 MG/ML
15 INJECTION, SOLUTION INTRAMUSCULAR; INTRAVENOUS EVERY 6 HOURS
Status: DISCONTINUED | OUTPATIENT
Start: 2019-01-01 | End: 2019-01-01

## 2019-01-01 RX ORDER — OLANZAPINE 10 MG/2ML
2.5 INJECTION, POWDER, FOR SOLUTION INTRAMUSCULAR DAILY PRN
Status: DISCONTINUED | OUTPATIENT
Start: 2019-01-01 | End: 2019-01-20 | Stop reason: HOSPADM

## 2019-01-01 RX ORDER — ONDANSETRON 2 MG/ML
4 INJECTION INTRAMUSCULAR; INTRAVENOUS EVERY 6 HOURS PRN
Status: DISCONTINUED | OUTPATIENT
Start: 2019-01-01 | End: 2019-01-20 | Stop reason: HOSPADM

## 2019-01-01 RX ORDER — POTASSIUM CHLORIDE 1500 MG/1
20-40 TABLET, EXTENDED RELEASE ORAL
Status: DISCONTINUED | OUTPATIENT
Start: 2019-01-01 | End: 2019-01-20 | Stop reason: HOSPADM

## 2019-01-01 RX ORDER — ONDANSETRON 2 MG/ML
4 INJECTION INTRAMUSCULAR; INTRAVENOUS EVERY 30 MIN PRN
Status: DISCONTINUED | OUTPATIENT
Start: 2019-01-01 | End: 2019-01-01

## 2019-01-01 RX ORDER — TAMSULOSIN HYDROCHLORIDE 0.4 MG/1
0.4 CAPSULE ORAL DAILY
Status: DISCONTINUED | OUTPATIENT
Start: 2019-01-01 | End: 2019-01-20 | Stop reason: HOSPADM

## 2019-01-01 RX ORDER — MEPERIDINE HYDROCHLORIDE 25 MG/ML
12.5 INJECTION INTRAMUSCULAR; INTRAVENOUS; SUBCUTANEOUS EVERY 5 MIN PRN
Status: DISCONTINUED | OUTPATIENT
Start: 2019-01-01 | End: 2019-01-01

## 2019-01-01 RX ORDER — CEFAZOLIN SODIUM 2 G/100ML
2 INJECTION, SOLUTION INTRAVENOUS EVERY 8 HOURS
Status: COMPLETED | OUTPATIENT
Start: 2019-01-01 | End: 2019-01-01

## 2019-01-01 RX ADMIN — OXYBUTYNIN CHLORIDE 5 MG: 5 TABLET ORAL at 09:05

## 2019-01-01 RX ADMIN — OXYCODONE HYDROCHLORIDE 5 MG: 5 TABLET ORAL at 06:35

## 2019-01-01 RX ADMIN — OMEPRAZOLE 20 MG: 20 CAPSULE, DELAYED RELEASE ORAL at 06:35

## 2019-01-01 RX ADMIN — Medication 0.2 MG: at 01:44

## 2019-01-01 RX ADMIN — PHENYLEPHRINE HYDROCHLORIDE 100 MCG: 10 INJECTION, SOLUTION INTRAMUSCULAR; INTRAVENOUS; SUBCUTANEOUS at 10:35

## 2019-01-01 RX ADMIN — Medication 12.5 MG: at 01:52

## 2019-01-01 RX ADMIN — Medication 1 MG: at 22:15

## 2019-01-01 RX ADMIN — ONDANSETRON HYDROCHLORIDE 4 MG: 2 INJECTION, SOLUTION INTRAMUSCULAR; INTRAVENOUS at 06:05

## 2019-01-01 RX ADMIN — ACETAMINOPHEN 975 MG: 325 TABLET ORAL at 13:55

## 2019-01-01 RX ADMIN — FINASTERIDE 5 MG: 5 TABLET, FILM COATED ORAL at 10:00

## 2019-01-01 RX ADMIN — SODIUM CHLORIDE, POTASSIUM CHLORIDE, SODIUM LACTATE AND CALCIUM CHLORIDE: 600; 310; 30; 20 INJECTION, SOLUTION INTRAVENOUS at 15:10

## 2019-01-01 RX ADMIN — KETOROLAC TROMETHAMINE 15 MG: 15 INJECTION, SOLUTION INTRAMUSCULAR; INTRAVENOUS at 20:33

## 2019-01-01 RX ADMIN — Medication 0.2 MG: at 04:04

## 2019-01-01 RX ADMIN — Medication 12.5 MG: at 10:30

## 2019-01-01 RX ADMIN — OXYCODONE HYDROCHLORIDE 5 MG: 5 TABLET ORAL at 13:33

## 2019-01-01 RX ADMIN — FINASTERIDE 5 MG: 5 TABLET, FILM COATED ORAL at 09:05

## 2019-01-01 RX ADMIN — ACETAMINOPHEN 975 MG: 325 TABLET ORAL at 23:08

## 2019-01-01 RX ADMIN — PHENYLEPHRINE HYDROCHLORIDE 100 MCG: 10 INJECTION, SOLUTION INTRAMUSCULAR; INTRAVENOUS; SUBCUTANEOUS at 10:31

## 2019-01-01 RX ADMIN — OXYCODONE HYDROCHLORIDE 5 MG: 5 TABLET ORAL at 15:35

## 2019-01-01 RX ADMIN — PHENYLEPHRINE HYDROCHLORIDE 100 MCG: 10 INJECTION, SOLUTION INTRAMUSCULAR; INTRAVENOUS; SUBCUTANEOUS at 10:39

## 2019-01-01 RX ADMIN — KETOROLAC TROMETHAMINE 15 MG: 15 INJECTION, SOLUTION INTRAMUSCULAR; INTRAVENOUS at 03:08

## 2019-01-01 RX ADMIN — ENOXAPARIN SODIUM 40 MG: 40 INJECTION SUBCUTANEOUS at 09:05

## 2019-01-01 RX ADMIN — ACETAMINOPHEN 975 MG: 325 TABLET ORAL at 00:29

## 2019-01-01 RX ADMIN — OXYCODONE HYDROCHLORIDE 5 MG: 5 TABLET ORAL at 17:42

## 2019-01-01 RX ADMIN — SODIUM CHLORIDE, SODIUM LACTATE, POTASSIUM CHLORIDE, CALCIUM CHLORIDE 1000 ML: 600; 310; 30; 20 INJECTION, SOLUTION INTRAVENOUS at 09:31

## 2019-01-01 RX ADMIN — SODIUM CHLORIDE, POTASSIUM CHLORIDE, SODIUM LACTATE AND CALCIUM CHLORIDE: 600; 310; 30; 20 INJECTION, SOLUTION INTRAVENOUS at 10:49

## 2019-01-01 RX ADMIN — OXYCODONE HYDROCHLORIDE 5 MG: 5 TABLET ORAL at 13:55

## 2019-01-01 RX ADMIN — FINASTERIDE 5 MG: 5 TABLET, FILM COATED ORAL at 09:48

## 2019-01-01 RX ADMIN — ACETAMINOPHEN 975 MG: 325 TABLET ORAL at 09:55

## 2019-01-01 RX ADMIN — KETOROLAC TROMETHAMINE 15 MG: 15 INJECTION, SOLUTION INTRAMUSCULAR; INTRAVENOUS at 09:48

## 2019-01-01 RX ADMIN — OXYBUTYNIN CHLORIDE 5 MG: 5 TABLET ORAL at 20:13

## 2019-01-01 RX ADMIN — PHENYLEPHRINE HYDROCHLORIDE 200 MCG: 10 INJECTION, SOLUTION INTRAMUSCULAR; INTRAVENOUS; SUBCUTANEOUS at 10:53

## 2019-01-01 RX ADMIN — Medication 12.5 MG: at 13:56

## 2019-01-01 RX ADMIN — SODIUM CHLORIDE, POTASSIUM CHLORIDE, SODIUM LACTATE AND CALCIUM CHLORIDE 500 ML: 600; 310; 30; 20 INJECTION, SOLUTION INTRAVENOUS at 14:53

## 2019-01-01 RX ADMIN — OXYBUTYNIN CHLORIDE 5 MG: 5 TABLET ORAL at 09:48

## 2019-01-01 RX ADMIN — OXYBUTYNIN CHLORIDE 5 MG: 5 TABLET ORAL at 22:08

## 2019-01-01 RX ADMIN — SODIUM CHLORIDE, POTASSIUM CHLORIDE, SODIUM LACTATE AND CALCIUM CHLORIDE: 600; 310; 30; 20 INJECTION, SOLUTION INTRAVENOUS at 02:08

## 2019-01-01 RX ADMIN — SODIUM CHLORIDE, POTASSIUM CHLORIDE, SODIUM LACTATE AND CALCIUM CHLORIDE: 600; 310; 30; 20 INJECTION, SOLUTION INTRAVENOUS at 20:24

## 2019-01-01 RX ADMIN — PROPOFOL 120 MG: 10 INJECTION, EMULSION INTRAVENOUS at 10:20

## 2019-01-01 RX ADMIN — FINASTERIDE 5 MG: 5 TABLET, FILM COATED ORAL at 09:29

## 2019-01-01 RX ADMIN — RANITIDINE 300 MG: 150 TABLET ORAL at 22:08

## 2019-01-01 RX ADMIN — Medication 12.5 MG: at 20:33

## 2019-01-01 RX ADMIN — Medication 0.2 MG: at 22:15

## 2019-01-01 RX ADMIN — OXYBUTYNIN CHLORIDE 5 MG: 5 TABLET ORAL at 10:00

## 2019-01-01 RX ADMIN — BUPROPION HYDROCHLORIDE 150 MG: 150 TABLET, FILM COATED, EXTENDED RELEASE ORAL at 09:48

## 2019-01-01 RX ADMIN — Medication 0.2 MG: at 09:10

## 2019-01-01 RX ADMIN — Medication 12.5 MG: at 01:41

## 2019-01-01 RX ADMIN — OLANZAPINE 2.5 MG: 10 INJECTION, POWDER, FOR SOLUTION INTRAMUSCULAR at 16:32

## 2019-01-01 RX ADMIN — BUPROPION HYDROCHLORIDE 150 MG: 150 TABLET, FILM COATED, EXTENDED RELEASE ORAL at 09:56

## 2019-01-01 RX ADMIN — ENOXAPARIN SODIUM 40 MG: 40 INJECTION SUBCUTANEOUS at 09:48

## 2019-01-01 RX ADMIN — OXYBUTYNIN CHLORIDE 5 MG: 5 TABLET ORAL at 20:24

## 2019-01-01 RX ADMIN — Medication 12.5 MG: at 20:22

## 2019-01-01 RX ADMIN — SODIUM CHLORIDE, POTASSIUM CHLORIDE, SODIUM LACTATE AND CALCIUM CHLORIDE: 600; 310; 30; 20 INJECTION, SOLUTION INTRAVENOUS at 18:46

## 2019-01-01 RX ADMIN — OXYCODONE HYDROCHLORIDE 5 MG: 5 TABLET ORAL at 06:50

## 2019-01-01 RX ADMIN — Medication 0.2 MG: at 06:18

## 2019-01-01 RX ADMIN — OXYCODONE HYDROCHLORIDE 5 MG: 5 TABLET ORAL at 03:07

## 2019-01-01 RX ADMIN — BUPROPION HYDROCHLORIDE 150 MG: 150 TABLET, FILM COATED, EXTENDED RELEASE ORAL at 20:33

## 2019-01-01 RX ADMIN — ENOXAPARIN SODIUM 40 MG: 40 INJECTION SUBCUTANEOUS at 09:55

## 2019-01-01 RX ADMIN — BUPROPION HYDROCHLORIDE 150 MG: 150 TABLET, FILM COATED, EXTENDED RELEASE ORAL at 09:05

## 2019-01-01 RX ADMIN — OMEPRAZOLE 20 MG: 20 CAPSULE, DELAYED RELEASE ORAL at 06:51

## 2019-01-01 RX ADMIN — ATORVASTATIN CALCIUM 20 MG: 10 TABLET, FILM COATED ORAL at 08:46

## 2019-01-01 RX ADMIN — BUPROPION HYDROCHLORIDE 150 MG: 150 TABLET, FILM COATED, EXTENDED RELEASE ORAL at 20:25

## 2019-01-01 RX ADMIN — FINASTERIDE 5 MG: 5 TABLET, FILM COATED ORAL at 08:47

## 2019-01-01 RX ADMIN — SODIUM CHLORIDE, POTASSIUM CHLORIDE, SODIUM LACTATE AND CALCIUM CHLORIDE: 600; 310; 30; 20 INJECTION, SOLUTION INTRAVENOUS at 08:55

## 2019-01-01 RX ADMIN — OMEPRAZOLE 20 MG: 20 CAPSULE, DELAYED RELEASE ORAL at 09:05

## 2019-01-01 RX ADMIN — ENOXAPARIN SODIUM 40 MG: 40 INJECTION SUBCUTANEOUS at 08:47

## 2019-01-01 RX ADMIN — HYDROMORPHONE HYDROCHLORIDE 0.2 MG: 1 INJECTION, SOLUTION INTRAMUSCULAR; INTRAVENOUS; SUBCUTANEOUS at 20:14

## 2019-01-01 RX ADMIN — OXYCODONE HYDROCHLORIDE 5 MG: 5 TABLET ORAL at 11:04

## 2019-01-01 RX ADMIN — SODIUM CHLORIDE, POTASSIUM CHLORIDE, SODIUM LACTATE AND CALCIUM CHLORIDE: 600; 310; 30; 20 INJECTION, SOLUTION INTRAVENOUS at 16:29

## 2019-01-01 RX ADMIN — CEFAZOLIN SODIUM 2 G: 2 INJECTION, SOLUTION INTRAVENOUS at 10:18

## 2019-01-01 RX ADMIN — BUPROPION HYDROCHLORIDE 150 MG: 150 TABLET, FILM COATED, EXTENDED RELEASE ORAL at 08:46

## 2019-01-01 RX ADMIN — SODIUM CHLORIDE, SODIUM LACTATE, POTASSIUM CHLORIDE, CALCIUM CHLORIDE: 600; 310; 30; 20 INJECTION, SOLUTION INTRAVENOUS at 11:45

## 2019-01-01 RX ADMIN — CEFAZOLIN SODIUM 2 G: 2 INJECTION, SOLUTION INTRAVENOUS at 02:57

## 2019-01-01 RX ADMIN — HYDROMORPHONE HYDROCHLORIDE 0.2 MG: 1 INJECTION, SOLUTION INTRAMUSCULAR; INTRAVENOUS; SUBCUTANEOUS at 18:19

## 2019-01-01 RX ADMIN — SODIUM CHLORIDE, POTASSIUM CHLORIDE, SODIUM LACTATE AND CALCIUM CHLORIDE: 600; 310; 30; 20 INJECTION, SOLUTION INTRAVENOUS at 02:56

## 2019-01-01 RX ADMIN — CEFAZOLIN SODIUM 2 G: 2 INJECTION, SOLUTION INTRAVENOUS at 17:02

## 2019-01-01 RX ADMIN — OXYBUTYNIN CHLORIDE 5 MG: 5 TABLET ORAL at 21:26

## 2019-01-01 RX ADMIN — SODIUM CHLORIDE, POTASSIUM CHLORIDE, SODIUM LACTATE AND CALCIUM CHLORIDE: 600; 310; 30; 20 INJECTION, SOLUTION INTRAVENOUS at 11:48

## 2019-01-01 RX ADMIN — RANITIDINE 300 MG: 150 TABLET ORAL at 20:22

## 2019-01-01 RX ADMIN — ALBUTEROL SULFATE 2.5 MG: 2.5 SOLUTION RESPIRATORY (INHALATION) at 10:48

## 2019-01-01 RX ADMIN — TAMSULOSIN HYDROCHLORIDE 0.4 MG: 0.4 CAPSULE ORAL at 09:05

## 2019-01-01 RX ADMIN — TAMSULOSIN HYDROCHLORIDE 0.4 MG: 0.4 CAPSULE ORAL at 09:29

## 2019-01-01 RX ADMIN — RANITIDINE 300 MG: 150 TABLET ORAL at 20:34

## 2019-01-01 RX ADMIN — FENTANYL CITRATE 25 MCG: 50 INJECTION, SOLUTION INTRAMUSCULAR; INTRAVENOUS at 11:46

## 2019-01-01 RX ADMIN — BUPROPION HYDROCHLORIDE 150 MG: 150 TABLET, FILM COATED, EXTENDED RELEASE ORAL at 20:13

## 2019-01-01 RX ADMIN — OXYBUTYNIN CHLORIDE 5 MG: 5 TABLET ORAL at 08:46

## 2019-01-01 RX ADMIN — OXYBUTYNIN CHLORIDE 5 MG: 5 TABLET ORAL at 20:22

## 2019-01-01 RX ADMIN — LORAZEPAM 1 MG: 2 INJECTION INTRAMUSCULAR; INTRAVENOUS at 12:03

## 2019-01-01 RX ADMIN — BUPROPION HYDROCHLORIDE 150 MG: 150 TABLET, FILM COATED, EXTENDED RELEASE ORAL at 09:29

## 2019-01-01 RX ADMIN — ACETAMINOPHEN 975 MG: 325 TABLET ORAL at 04:11

## 2019-01-01 RX ADMIN — PHENYLEPHRINE HYDROCHLORIDE 100 MCG: 10 INJECTION, SOLUTION INTRAMUSCULAR; INTRAVENOUS; SUBCUTANEOUS at 10:25

## 2019-01-01 RX ADMIN — OXYCODONE HYDROCHLORIDE 5 MG: 5 TABLET ORAL at 00:47

## 2019-01-01 RX ADMIN — HALOPERIDOL LACTATE 2 MG: 5 INJECTION, SOLUTION INTRAMUSCULAR at 01:38

## 2019-01-01 RX ADMIN — OMEPRAZOLE 20 MG: 20 CAPSULE, DELAYED RELEASE ORAL at 07:54

## 2019-01-01 RX ADMIN — FENTANYL CITRATE 50 MCG: 50 INJECTION, SOLUTION INTRAMUSCULAR; INTRAVENOUS at 10:16

## 2019-01-01 RX ADMIN — ONDANSETRON 4 MG: 2 INJECTION INTRAMUSCULAR; INTRAVENOUS at 11:00

## 2019-01-01 RX ADMIN — OXYCODONE HYDROCHLORIDE 5 MG: 5 TABLET ORAL at 01:41

## 2019-01-01 RX ADMIN — BUPROPION HYDROCHLORIDE 150 MG: 150 TABLET, FILM COATED, EXTENDED RELEASE ORAL at 21:26

## 2019-01-01 RX ADMIN — PHENYLEPHRINE HYDROCHLORIDE 100 MCG: 10 INJECTION, SOLUTION INTRAMUSCULAR; INTRAVENOUS; SUBCUTANEOUS at 10:45

## 2019-01-01 RX ADMIN — POTASSIUM CHLORIDE 20 MEQ: 1500 TABLET, EXTENDED RELEASE ORAL at 10:51

## 2019-01-01 RX ADMIN — HALOPERIDOL LACTATE 2 MG: 5 INJECTION, SOLUTION INTRAMUSCULAR at 14:56

## 2019-01-01 RX ADMIN — TAMSULOSIN HYDROCHLORIDE 0.4 MG: 0.4 CAPSULE ORAL at 09:56

## 2019-01-01 RX ADMIN — POTASSIUM CHLORIDE 40 MEQ: 1500 TABLET, EXTENDED RELEASE ORAL at 09:05

## 2019-01-01 RX ADMIN — OXYCODONE HYDROCHLORIDE 5 MG: 5 TABLET ORAL at 22:22

## 2019-01-01 RX ADMIN — SODIUM CHLORIDE, POTASSIUM CHLORIDE, SODIUM LACTATE AND CALCIUM CHLORIDE: 600; 310; 30; 20 INJECTION, SOLUTION INTRAVENOUS at 18:50

## 2019-01-01 RX ADMIN — SODIUM CHLORIDE, POTASSIUM CHLORIDE, SODIUM LACTATE AND CALCIUM CHLORIDE: 600; 310; 30; 20 INJECTION, SOLUTION INTRAVENOUS at 19:42

## 2019-01-01 RX ADMIN — ENOXAPARIN SODIUM 40 MG: 40 INJECTION SUBCUTANEOUS at 07:54

## 2019-01-01 RX ADMIN — OXYBUTYNIN CHLORIDE 5 MG: 5 TABLET ORAL at 20:33

## 2019-01-01 RX ADMIN — BUPROPION HYDROCHLORIDE 150 MG: 150 TABLET, FILM COATED, EXTENDED RELEASE ORAL at 20:22

## 2019-01-01 RX ADMIN — OXYBUTYNIN CHLORIDE 5 MG: 5 TABLET ORAL at 09:29

## 2019-01-01 RX ADMIN — ACETAMINOPHEN 975 MG: 325 TABLET ORAL at 17:42

## 2019-01-01 RX ADMIN — TAMSULOSIN HYDROCHLORIDE 0.4 MG: 0.4 CAPSULE ORAL at 09:48

## 2019-01-01 RX ADMIN — BUPROPION HYDROCHLORIDE 150 MG: 150 TABLET, FILM COATED, EXTENDED RELEASE ORAL at 22:08

## 2019-01-01 RX ADMIN — RANITIDINE 300 MG: 150 TABLET ORAL at 21:25

## 2019-01-01 RX ADMIN — Medication 12.5 MG: at 09:49

## 2019-01-01 RX ADMIN — ACETAMINOPHEN 975 MG: 325 TABLET ORAL at 14:45

## 2019-01-01 RX ADMIN — RANITIDINE 300 MG: 150 TABLET ORAL at 20:25

## 2019-01-01 RX ADMIN — ATORVASTATIN CALCIUM 20 MG: 10 TABLET, FILM COATED ORAL at 09:56

## 2019-01-01 RX ADMIN — Medication 12.5 MG: at 09:30

## 2019-01-01 RX ADMIN — SODIUM CHLORIDE: 9 INJECTION, SOLUTION INTRAVENOUS at 22:08

## 2019-01-01 RX ADMIN — ACETAMINOPHEN 975 MG: 325 TABLET ORAL at 09:05

## 2019-01-01 RX ADMIN — ONDANSETRON HYDROCHLORIDE 4 MG: 2 INJECTION, SOLUTION INTRAMUSCULAR; INTRAVENOUS at 13:14

## 2019-01-01 RX ADMIN — SODIUM CHLORIDE 1000 ML: 9 INJECTION, SOLUTION INTRAVENOUS at 19:48

## 2019-01-01 RX ADMIN — ACETAMINOPHEN 975 MG: 325 TABLET ORAL at 20:22

## 2019-01-01 RX ADMIN — TAMSULOSIN HYDROCHLORIDE 0.4 MG: 0.4 CAPSULE ORAL at 08:47

## 2019-01-01 RX ADMIN — HYDROMORPHONE HYDROCHLORIDE 0.2 MG: 1 INJECTION, SOLUTION INTRAMUSCULAR; INTRAVENOUS; SUBCUTANEOUS at 04:29

## 2019-01-01 RX ADMIN — PHENYLEPHRINE HYDROCHLORIDE 100 MCG: 10 INJECTION, SOLUTION INTRAMUSCULAR; INTRAVENOUS; SUBCUTANEOUS at 10:23

## 2019-01-01 RX ADMIN — ACETAMINOPHEN 975 MG: 325 TABLET ORAL at 22:30

## 2019-01-01 RX ADMIN — SODIUM CHLORIDE: 9 INJECTION, SOLUTION INTRAVENOUS at 06:19

## 2019-01-01 RX ADMIN — KETOROLAC TROMETHAMINE 15 MG: 15 INJECTION, SOLUTION INTRAMUSCULAR; INTRAVENOUS at 14:45

## 2019-01-01 RX ADMIN — RANITIDINE 300 MG: 150 TABLET ORAL at 20:13

## 2019-01-01 RX ADMIN — LIDOCAINE HYDROCHLORIDE 60 MG: 20 INJECTION, SOLUTION INFILTRATION; PERINEURAL at 10:20

## 2019-01-01 RX ADMIN — HYDROMORPHONE HYDROCHLORIDE 0.2 MG: 1 INJECTION, SOLUTION INTRAMUSCULAR; INTRAVENOUS; SUBCUTANEOUS at 04:12

## 2019-01-01 RX ADMIN — FENTANYL CITRATE 25 MCG: 50 INJECTION, SOLUTION INTRAMUSCULAR; INTRAVENOUS at 10:56

## 2019-01-01 RX ADMIN — SODIUM CHLORIDE, POTASSIUM CHLORIDE, SODIUM LACTATE AND CALCIUM CHLORIDE 1000 ML: 600; 310; 30; 20 INJECTION, SOLUTION INTRAVENOUS at 17:34

## 2019-01-01 ASSESSMENT — ACTIVITIES OF DAILY LIVING (ADL)
ADLS_ACUITY_SCORE: 23
ADLS_ACUITY_SCORE: 21
ADLS_ACUITY_SCORE: 21
ADLS_ACUITY_SCORE: 23
ADLS_ACUITY_SCORE: 15
ADLS_ACUITY_SCORE: 12
ADLS_ACUITY_SCORE: 19
ADLS_ACUITY_SCORE: 15
ADLS_ACUITY_SCORE: 21
ADLS_ACUITY_SCORE: 21
ADLS_ACUITY_SCORE: 23
ADLS_ACUITY_SCORE: 15
ADLS_ACUITY_SCORE: 15
ADLS_ACUITY_SCORE: 12
ADLS_ACUITY_SCORE: 21
ADLS_ACUITY_SCORE: 19
ADLS_ACUITY_SCORE: 23
ADLS_ACUITY_SCORE: 14
ADLS_ACUITY_SCORE: 21
ADLS_ACUITY_SCORE: 23
PREVIOUS_RESPONSIBILITIES: MEAL PREP;HOUSEKEEPING;YARDWORK;DRIVING
ADLS_ACUITY_SCORE: 19
ADLS_ACUITY_SCORE: 14
ADLS_ACUITY_SCORE: 15
ADLS_ACUITY_SCORE: 21
ADLS_ACUITY_SCORE: 15
ADLS_ACUITY_SCORE: 19
ADLS_ACUITY_SCORE: 15
ADLS_ACUITY_SCORE: 12
ADLS_ACUITY_SCORE: 19
ADLS_ACUITY_SCORE: 14
ADLS_ACUITY_SCORE: 19
ADLS_ACUITY_SCORE: 23
ADLS_ACUITY_SCORE: 19
ADLS_ACUITY_SCORE: 19
ADLS_ACUITY_SCORE: 15

## 2019-01-01 ASSESSMENT — MIFFLIN-ST. JEOR: SCORE: 1691

## 2019-01-01 ASSESSMENT — LIFESTYLE VARIABLES: TOBACCO_USE: 1

## 2019-01-13 PROBLEM — J10.1 INFLUENZA A: Status: RESOLVED | Noted: 2017-02-21 | Resolved: 2019-01-01

## 2019-01-13 PROBLEM — S72.091A: Status: ACTIVE | Noted: 2019-01-01

## 2019-01-13 PROBLEM — R53.1 WEAKNESS GENERALIZED: Status: RESOLVED | Noted: 2017-02-21 | Resolved: 2019-01-01

## 2019-01-13 PROBLEM — I95.9 HYPOTENSION, UNSPECIFIED HYPOTENSION TYPE: Status: RESOLVED | Noted: 2017-02-21 | Resolved: 2019-01-01

## 2019-01-13 PROBLEM — S72.001A CLOSED RIGHT HIP FRACTURE (H): Status: ACTIVE | Noted: 2019-01-01

## 2019-01-13 PROBLEM — N17.9 ACUTE KIDNEY INJURY (H): Status: RESOLVED | Noted: 2017-02-21 | Resolved: 2019-01-01

## 2019-01-13 NOTE — ED NOTES
Pt put on cont cardiac monitor, oximeter, BP cuff and Keyana hugger applied on arrival. Has hypothermia hat on from ambulance

## 2019-01-13 NOTE — ED TRIAGE NOTES
Was outside by car and fell.  Crawled to house and was outside for approximately 90 minutes. Right hip pain.

## 2019-01-13 NOTE — ED PROVIDER NOTES
History     Chief Complaint   Patient presents with     Fall     The history is provided by the patient and the spouse.     Dean Mccarty is a 89 year old male who presents to the ED complaining of a fall. Patient states he was outside next to his car when he slipped and fell onto ice. Patient injured his right hip and was outside for approximately 1 1/2 hours as he was unable to get up on his own. EMS arrived to the scene and gave him 50 mg fentanyl en route to the ED. Patient is unable to move the right leg with significant amounts of right hip pain. He does not have pain when he keeps the leg still. Patient denies previous injury to his hips. Patient denies head or neck trauma. Patient is not on blood thinners. His heart and lungs are healthy.     Problem List:    Patient Active Problem List    Diagnosis Date Noted     Closed right hip fracture (H) 01/13/2019     Priority: Medium     Closed comminuted fracture of right hip (H) 01/13/2019     Priority: Medium     Primary osteoarthritis of both knees 05/16/2018     Priority: Medium     Abnormal chest x-ray - infiltrate right base 02/21/2017     Priority: Medium     Abnormal gait 02/21/2017     Priority: Medium     Seasonal allergic rhinitis, unspecified allergic rhinitis trigger 11/16/2016     Priority: Medium     Major depressive disorder, recurrent episode, mild (H) 10/27/2015     Priority: Medium     Esophageal reflux 04/21/2014     Priority: Medium     Hyperplasia of prostate 04/07/2014     Priority: Medium     Adjustment disorder with anxiety 08/24/2013     Priority: Medium     Advanced directives, counseling/discussion 05/17/2012     Priority: Medium     Discussed advance care planning with patient; information given to patient to review. 5/17/2012 MMJ         Hypertension goal BP (blood pressure) < 140/80 10/03/2011     Priority: Medium     Hyperlipidemia LDL goal <100 10/31/2010     Priority: Medium     Mild major depression (H) 08/22/2006      Priority: Medium     Chronic ischemic heart disease 2005     Priority: Medium     Problem list name updated by automated process. Provider to review          Past Medical History:    Past Medical History:   Diagnosis Date     Chronic ischemic heart disease, unspecified      Depressive disorder, not elsewhere classified      Primary osteoarthritis of both knees      Unspecified essential hypertension      Unspecified hyperplasia of prostate      Weakness generalized 2017       Past Surgical History:    Past Surgical History:   Procedure Laterality Date     C ANESTH,DX ARTHROSCOPIC PROC KNEE JOINT  05    Left knee, with partial meniscectomy.     HC REMOVE TONSILS/ADENOIDS,<11 Y/O      T & A <12y.o.     HC UGI ENDOSCOPY DIAG W BIOPSY  09       Family History:    Family History   Problem Relation Age of Onset     Diabetes Maternal Grandmother        Social History:  Marital Status:   [2]  Social History     Tobacco Use     Smoking status: Former Smoker     Packs/day: 2.00     Years: 25.00     Pack years: 50.00     Types: Cigarettes     Last attempt to quit: 1970     Years since quittin.0     Smokeless tobacco: Never Used   Substance Use Topics     Alcohol use: No     Alcohol/week: 0.0 oz     Comment: rarely     Drug use: No        Medications:      No current outpatient medications on file.      Review of Systems   All other systems reviewed and are negative.      Physical Exam   BP: 146/85  Pulse: 108  Heart Rate: 111  Temp: 97.6  F (36.4  C)  Resp: 20  Height: 182.9 cm (6')  Weight: 99.8 kg (220 lb)  SpO2: 98 %      Physical Exam   Constitutional: He is oriented to person, place, and time. No distress.   HENT:   Head: Normocephalic and atraumatic.   Mouth/Throat: Oropharynx is clear and moist.   Eyes: EOM are normal. Pupils are equal, round, and reactive to light. No scleral icterus.   Neck: Normal range of motion. Neck supple.   Cardiovascular: Regular rhythm, normal heart  sounds, intact distal pulses and normal pulses. Tachycardia present.   Pulmonary/Chest: Breath sounds normal. No respiratory distress. He exhibits no tenderness.   Abdominal: Soft. Bowel sounds are normal. There is no tenderness.   Musculoskeletal: Normal range of motion. He exhibits no edema or tenderness.        Cervical back: He exhibits no tenderness.        Thoracic back: He exhibits no tenderness.        Lumbar back: He exhibits no tenderness.   Decreased ROM to the right hip. Lateral swelling over the greater trochanteric.    Neurological: He is alert and oriented to person, place, and time.   Skin: Skin is warm. No abrasion, no laceration and no rash noted. He is not diaphoretic.   Psychiatric: He has a normal mood and affect. His behavior is normal.   Nursing note and vitals reviewed.      ED Course        Procedures               Critical Care time:  none   EKG: sinus rhythm, normal axis.  Rate of 113 beats per minute.  No acute ST or T wave changes.  Interpreted by myself.              Results for orders placed or performed during the hospital encounter of 01/13/19 (from the past 24 hour(s))   CBC with platelets differential   Result Value Ref Range    WBC 15.7 (H) 4.0 - 11.0 10e9/L    RBC Count 4.51 4.4 - 5.9 10e12/L    Hemoglobin 13.5 13.3 - 17.7 g/dL    Hematocrit 40.4 40.0 - 53.0 %    MCV 90 78 - 100 fl    MCH 29.9 26.5 - 33.0 pg    MCHC 33.4 31.5 - 36.5 g/dL    RDW 13.2 10.0 - 15.0 %    Platelet Count 227 150 - 450 10e9/L    Diff Method Automated Method     % Neutrophils 83.3 %    % Lymphocytes 7.9 %    % Monocytes 7.8 %    % Eosinophils 0.3 %    % Basophils 0.1 %    % Immature Granulocytes 0.6 %    Nucleated RBCs 0 0 /100    Absolute Neutrophil 13.1 (H) 1.6 - 8.3 10e9/L    Absolute Lymphocytes 1.2 0.8 - 5.3 10e9/L    Absolute Monocytes 1.2 0.0 - 1.3 10e9/L    Absolute Basophils 0.0 0.0 - 0.2 10e9/L    Abs Immature Granulocytes 0.1 0 - 0.4 10e9/L    Absolute Nucleated RBC 0.0    Comprehensive  metabolic panel   Result Value Ref Range    Sodium 143 133 - 144 mmol/L    Potassium 3.9 3.4 - 5.3 mmol/L    Chloride 110 (H) 94 - 109 mmol/L    Carbon Dioxide 23 20 - 32 mmol/L    Anion Gap 10 3 - 14 mmol/L    Glucose 128 (H) 70 - 99 mg/dL    Urea Nitrogen 18 7 - 30 mg/dL    Creatinine 1.35 (H) 0.66 - 1.25 mg/dL    GFR Estimate 46 (L) >60 mL/min/[1.73_m2]    GFR Estimate If Black 53 (L) >60 mL/min/[1.73_m2]    Calcium 8.9 8.5 - 10.1 mg/dL    Bilirubin Total 0.3 0.2 - 1.3 mg/dL    Albumin 3.6 3.4 - 5.0 g/dL    Protein Total 6.9 6.8 - 8.8 g/dL    Alkaline Phosphatase 98 40 - 150 U/L    ALT 18 0 - 70 U/L    AST 18 0 - 45 U/L   XR Chest 1 View    Narrative    CHEST ONE VIEW 1/13/2019 6:47 PM     HISTORY: Trauma, pre-op.     COMPARISON: 2/21/2017    FINDINGS: Small right upper lobe nodule, stable dating back to  1/19/2013. Aortic calcification. There appears to be a hiatal hernia.      Impression    IMPRESSION:  No acute consolidation.    GIGI SIMONS MD   XR Pelvis and Hip Right 2 Views    Narrative    PELVIS AND HIP RIGHT TWO VIEWS   1/13/2019 6:48 PM     HISTORY:  Trauma.    COMPARISON: Pelvis 8/1/2016    FINDINGS: Degenerative change of the lower lumbar spine.      Impression    IMPRESSION: Comminuted intertrochanteric right hip fracture with  displacement. There is also displacement of the lesser trochanter.    GIGI SIMONS MD   Lactic acid level STAT for sepsis protocol   Result Value Ref Range    Lactate for Sepsis Protocol 1.9 0.7 - 2.0 mmol/L       Medications   0.9% sodium chloride BOLUS (0 mLs Intravenous ED Infusing on Admission/transfer 1/13/19 2046)     Followed by   sodium chloride 0.9% infusion (not administered)   lidocaine 1 % 1 mL (not administered)   lidocaine (LMX4) kit (not administered)   sodium chloride (PF) 0.9% PF flush 3 mL (not administered)   sodium chloride (PF) 0.9% PF flush 3 mL (not administered)   HYDROcodone-acetaminophen (NORCO) 5-325 MG per tablet 1-2 tablet (not administered)    atorvastatin (LIPITOR) tablet 20 mg (not administered)   buPROPion (ZYBAN) 12 hr tablet 150 mg (not administered)   finasteride (PROSCAR) tablet 5 mg (not administered)   omeprazole (priLOSEC) CR capsule 20 mg (not administered)   oxybutynin (DITROPAN) tablet 5 mg (not administered)   ranitidine (ZANTAC) tablet 300 mg (not administered)   tamsulosin (FLOMAX) capsule 0.4 mg (not administered)   naloxone (NARCAN) injection 0.1-0.4 mg (not administered)   melatonin tablet 1 mg (not administered)   sodium chloride 0.9% infusion (not administered)   HYDROmorphone (PF) (DILAUDID) injection 0.2 mg (not administered)   ondansetron (ZOFRAN-ODT) ODT tab 4 mg (not administered)     Or   ondansetron (ZOFRAN) injection 4 mg (not administered)   prochlorperazine (COMPAZINE) injection 5 mg (not administered)     Or   prochlorperazine (COMPAZINE) tablet 5 mg (not administered)     Or   prochlorperazine (COMPAZINE) Suppository 12.5 mg (not administered)   metoclopramide (REGLAN) tablet 5 mg (not administered)     Or   metoclopramide (REGLAN) injection 5 mg (not administered)       Assessments & Plan (with Medical Decision Making)  Dean Mccarty is a 89 year old male who presents to the ED via EMS after falling on the ice and injuring the right hip.  Right intertrochanteric hip fracture noted.  X-rays reviewed with orthopedist, Dr. Villareal.  Plan will be for admission to the hospitalist service and anticipate likely surgery tomorrow.  Sinus tachycardia noted.  He denies pain anywhere else but the hip.  Examination of the head, neck, chest and abdomen on repeated exams was benign.  Tachycardia may be related to pain or hydration status.  Given IV Dilaudid.  IV fluids ordered.  No significant hypotension noted to indicate significant blood loss yet.  However, did sign out to hospitalist that this will need to be monitored tonight.     I have reviewed the nursing notes.    I have reviewed the findings, diagnosis, plan and need for  follow up with the patient.           Medication List      There are no discharge medications for this visit.         Final diagnoses:   Hip fracture, right, closed, initial encounter (H)     This document serves as a record of services personally performed by Bob Manuel MD. It was created on their behalf by Db Lang, a trained medical scribe. The creation of this record is based on the provider's personal observations and the statements of the patient. This document has been checked and approved by the attending provider.    Note: Chart documentation done in part with Dragon Voice Recognition software. Although reviewed after completion, some word and grammatical errors may remain.    1/13/2019   [facility] EMERGENCY DEPARTMENT     Bob Manuel MD  01/13/19 [id number]

## 2019-01-14 NOTE — BRIEF OP NOTE
[facility] Brief Operative Note    Pre-operative diagnosis: right comminuted displaced IT hip fracture   Post-operative diagnosis: right comminuted displaced IT hip fracture   Procedure: Procedure(s):  closed REDUCTION INTERNAL FIXATION HIP NAILING   Surgeon:  Anesthesia: Hernandez Villareal DO  General   Assistant(s): João Jean PA-C (A Physician Assisstant was necessary for his expertise, exposure and surgical assistance throughout the case.)   Estimated blood loss:  Fluids:  UO:  Implant: Less than 100 ml  1L Crystalloids  350 ml  Rahel gamma nail 11 mm x 400 mm x 125 degrees   Specimens: None   Findings: See dictated operative report for full details [id number]     Jose Alejandro Villareal D.O.

## 2019-01-14 NOTE — PLAN OF CARE
S-(situation): Transfer note/shift note    B-(background): s/p ORIF right hip    A-(assessment): Admitted to 249 from PACU at 1300.  Denied pain upon transfer, though would wince with movement.  Patient tolerating applesauce and water; oxycodone and acetaminophen given for pain.  Good CMS.  Was confused upon transfer to med/surg-- has cleared within past hour.  Wife at bedside with patient.    R-(recommendations): Monitor pain, CMS, vitals; routine post-op care.

## 2019-01-14 NOTE — PLAN OF CARE
Discharge Planner PT   Patient plan for discharge: TCU  Current status: Patient is a 89 year old male day 1 status post fall resulting in right closed comminuted displaced IT hip fracture, day 0 status post right CRIF hip nailing. Patient is toe touch for transfers only. Patient has a previous medical history of chronic ischemic heart disease, OA bilateral knees, depression, anxiety, HTN and history of left TKA. Prior to admission patient living in a single family home with his wife, 6 stairs to enter with single hand rail and stairs to lower level. Patient not using assistive device and wife reports history of impaired balance within the home aside from this fall on the ice. Currently, patient with no pain at rest, and increased with PROM and AAROM of the hip. Demonstrates good UE strength while cognitive impaired as well as hard of hearing. Completed post operative right hip HEP with fair tolerance, fear and increased pain. Attempted to sit EOB, however patient unable to sequence scooting in bed and actively maneuver RLE in bed. Opted to defer until cognitively cleared tomorrow.  Barriers to return to prior living situation: Medical status, WB status, pain, stairs, reduced functional mobility  Recommendations for discharge: TCU  Rationale for recommendations: Patient unable to mobilize this date. Due to weight bearing status patient will require placement to ensure compliance and safely progress him towards return home. Patient would benefit from continued skilled therapeutic intervention in order to progress them towards a higher level of function in accordance with their surgeon's protocol.       Entered by: Britni Pelayo 01/14/2019 4:52 PM

## 2019-01-14 NOTE — ANESTHESIA PREPROCEDURE EVALUATION
Anesthesia Pre-Procedure Evaluation    Patient: Dean Mccarty   MRN: [id number] : 1928          Preoperative Diagnosis: Dx: Right hip fracture    Procedure(s):  OPEN REDUCTION INTERNAL FIXATION HIP NAILING    Past Medical History:   Diagnosis Date     Chronic ischemic heart disease, unspecified     Coronary artery dis     Depressive disorder, not elsewhere classified      Primary osteoarthritis of both knees      Unspecified essential hypertension      Unspecified hyperplasia of prostate      Weakness generalized 2017     Past Surgical History:   Procedure Laterality Date     C ANESTH,DX ARTHROSCOPIC PROC KNEE JOINT  05    Left knee, with partial meniscectomy.     HC REMOVE TONSILS/ADENOIDS,<13 Y/O      T & A <12y.o.     HC UGI ENDOSCOPY DIAG W BIOPSY  09       Anesthesia Evaluation     . Pt has had prior anesthetic. Type: General           ROS/MED HX    ENT/Pulmonary:  - neg pulmonary ROS   (+)tobacco use, Past use , . .    Neurologic: Comment: Some memory loss and [place]      Cardiovascular: Comment: Chronic ischemic heart disease unspecified  Had an angioplasty around  per his family.  No Stents and no chest pain issues since then    (+) Dyslipidemia, hypertension----. : . . . :. . Previous cardiac testing date:results:date: results:ECG reviewed date: results:Sinus Tachycardia   -Left axis -anterior fascicular block.    -Poor R-wave progression -nonspecific -consider old anterior infarct.    date: results:          METS/Exercise Tolerance:  >4 METS   Hematologic:  - neg hematologic  ROS       Musculoskeletal:   (+) , , other musculoskeletal- Right hip fx      GI/Hepatic:     (+) GERD Asymptomatic on medication,       Renal/Genitourinary: Comment: Hyperplasia of the prostate        Endo:  - neg endo ROS       Psychiatric:     (+) psychiatric history depression      Infectious Disease:  - neg infectious disease ROS       Malignancy:      - no malignancy   Other:    - neg other ROS                       Physical Exam  Normal systems: cardiovascular and pulmonary    Airway   Mallampati: II  TM distance: >3 FB  Neck ROM: full    Dental   Comment: Teeth are in poor condition with some that are missing and some chipped    Cardiovascular   Rhythm and rate: regular and normal      Pulmonary    breath sounds clear to auscultation            Lab Results   Component Value Date    WBC 9.9 01/14/2019    HGB 10.9 (L) 01/14/2019    HCT 32.9 (L) 01/14/2019     01/14/2019    CRP 49.3 (H) 02/22/2016    SED 10 08/23/2002     01/14/2019    POTASSIUM 4.3 01/14/2019    CHLORIDE 112 (H) 01/14/2019    CO2 24 01/14/2019    BUN 18 01/14/2019    CR 1.09 01/14/2019     (H) 01/14/2019    ALEJO 7.9 (L) 01/14/2019    MAG 2.1 03/23/2008    ALBUMIN 3.6 01/13/2019    PROTTOTAL 6.9 01/13/2019    ALT 18 01/13/2019    AST 18 01/13/2019    ALKPHOS 98 01/13/2019    BILITOTAL 0.3 01/13/2019    PTT 36 08/01/2016    INR 1.01 08/01/2016    TSH 3.46 06/27/2016       Preop Vitals  BP Readings from Last 3 Encounters:   01/14/19 137/72   12/11/18 127/79   11/10/18 (!) 173/91    Pulse Readings from Last 3 Encounters:   01/14/19 98   11/10/18 75   08/08/18 86      Resp Readings from Last 3 Encounters:   01/14/19 16   12/11/18 16   06/26/18 16    SpO2 Readings from Last 3 Encounters:   01/14/19 97%   12/11/18 96%   11/10/18 99%      Temp Readings from Last 1 Encounters:   01/14/19 99.8  F (37.7  C) (Oral)    Ht Readings from Last 1 Encounters:   01/13/19 1.829 m (6')      Wt Readings from Last 1 Encounters:   01/13/19 99.3 kg (218 lb 14.7 oz)    Estimated body mass index is 29.69 kg/m  as calculated from the following:    Height as of this encounter: 1.829 m (6').    Weight as of this encounter: 99.3 kg (218 lb 14.7 oz).       Anesthesia Plan      History & Physical Review  History and physical reviewed and following examination; no interval change.    ASA Status:  3 emergent.    NPO Status:  > 8 hours    Plan for General  and LMA with Intravenous and Propofol induction. Maintenance will be Inhalation and Balanced.    PONV prophylaxis:  Ondansetron (or other 5HT-3)       Postoperative Care  Postoperative pain management:  IV analgesics and Oral pain medications.      Consents  Anesthetic plan, risks, benefits and alternatives discussed with:  Patient, Spouse and Daughter/Son.  Use of blood products discussed: No .   .                 RENETTA Palumbo CRNA

## 2019-01-14 NOTE — PROGRESS NOTES
S: Patient arrives to room 249 via cart from ED    B: Right Hip Fracture     A: Right hip pain controlled with Dilaudid. Alert and oriented, cooperative. Skin cool and dry. CMS intact.     R: Orders reviewed with patient. Will monitor patient per MD orders.     Inpatient nursing criteria listed below were met:    Health care directives status obtained and documented: Yes  SCD's Documented: Yes  Skin issues/needs documented:Yes  Isolation needs addressed, if appropriate: NA  Fall Prevention: Care plan updated, Education given and documented Yes  MRSA swab completed for patient 55 years and older (exclude SAMUEL and TKA): Yes  Care Plan initiated: Yes  Education Assessment documented:Yes  Education Documented (Pre-existing chronic infection such as, MRSA/VRE need education on admission): Yes  Admission Medication Reconciliation completed: Yes  New medication patient education completed and documented (Possible Side Effects of Common Medications handout): Yes  Home medications if not able to send immediately home with family stored here: NA  Reminder note placed in discharge instructions: NA  Discharge planning review completed (admission navigator) Yes

## 2019-01-14 NOTE — PROGRESS NOTES
SPIRITUAL HEALTH SERVICES  SPIRITUAL ASSESSMENT Progress Note  [facility]      During Rounding,  introduced himself to David Mccarty and informed him of his availability.    John Servin M.Div., [facility]  Staff   Office tel: [phone]

## 2019-01-14 NOTE — ANESTHESIA CARE TRANSFER NOTE
Patient: Dean Mccarty    Procedure(s):  OPEN REDUCTION INTERNAL FIXATION HIP NAILING    Diagnosis: Dx: Right hip fracture  Diagnosis Additional Information: No value filed.    Anesthesia Type:   General, ETT     Note:  Airway :Face Mask  Patient transferred to:PACU  Handoff Report: Identifed the Patient, Identified the Reponsible Provider, Reviewed the pertinent medical history, Discussed the surgical course, Reviewed Intra-OP anesthesia mangement and issues during anesthesia, Set expectations for post-procedure period and Allowed opportunity for questions and acknowledgement of understanding      Vitals: (Last set prior to Anesthesia Care Transfer)    CRNA VITALS  1/14/2019 1115 - 1/14/2019 1215      1/14/2019             Pulse:  79    SpO2:  95 %                Electronically Signed By: RENETTA Francis CRNA  January 14, 2019  12:55 PM

## 2019-01-14 NOTE — OR NURSING
Verbal report received from Tamiko CAPONE RN and Pradeep MORSE. Phase 1 recovery assumed by Priscilla MORALES. Pt post-op general anesthesia for right hip ORIF per .

## 2019-01-14 NOTE — H&P
[facility]    History and Physical  Hospitalist       Date of Admission:  1/13/2019    Assessment & Plan   Dean Mccarty is a 89 year old male who presents with closed right hip fracture    Principal Problem:    Closed comminuted fracture of right hip (H)    Assessment: he is in pain but well managed on current pain regimen    Plan: ordered pain management with dilaudid and Norco  Ordered Ortho consult  Patient is at low risk for complications from surgery  Active Problems:    Chronic ischemic heart disease    Assessment: stable    Plan: EKG- sinus tachycardia    Hyperlipidemia LDL goal <100    Assessment: stable    Plan: continue home dose statin    Hypertension goal BP (blood pressure) < 140/80    Assessment: stable    Plan: continue home meds    Adjustment disorder with anxiety    Assessment: stable    Plan: continue home meds    Hyperplasia of prostate    Assessment: he doesn't have any issues with urination right now    Plan: continue home meds    # Pain Assessment:  Current Pain Score 1/13/2019   Patient currently in pain? yes   Pain score (0-10) -   Pain location -   Pain descriptors -   - Dean is experiencing pain due to right hip fracture. Pain management was discussed and the plan was created in a collaborative fashion.  Dean's response to the current recommendations: engaged  compliant  - Please see the plan for pain management as documented above        DVT Prophylaxis: he is going surgery tomorrow so he will mechanical DVT prophylaxis  Code Status: Full Code    Disposition: Expected discharge in 3-5 days once he has surgery and stabilizes.    Bisi Villalobos    Primary Care Physician   Leobardo Yoo    Chief Complaint   Fall and has rigth hip fracture    History is obtained from the patient    History of Present Illness   Dean Mccarty is a 89 year old male who presents with fall.  Patient fell down by slipping on ice around 3:15 PM and laid  on the ground for almost 1-1/2-hour.  Patient mentioned he was about to go to his son's place and he landed on the ground by slipping on ice in his driveway and he could not get up because of the pain in the right hip region.  After  1 1/2 hr he tried to make couple steps and called his wife and they brought him to the ER.  Patient denies any symptoms other than right hip pain.  He denies any weakness, numbness, tingling.    In the ER his x-ray showed comminuted fracture of the intertrochanteric region in the right hip with displacement.  He was slightly tachy in the ER but he denies any chest pain, shortness of breath.  His EKG showed sinus tachycardia.  Orthopedic surgeon was consulted and they will see him tomorrow and possibly surgery tomorrow.    Past Medical History    I have reviewed this patient's medical history and updated it with pertinent information if needed.   Past Medical History:   Diagnosis Date     Chronic ischemic heart disease, unspecified     Coronary artery dis     Depressive disorder, not elsewhere classified      Primary osteoarthritis of both knees      Unspecified essential hypertension      Unspecified hyperplasia of prostate      Weakness generalized 2017       Past Surgical History   I have reviewed this patient's surgical history and updated it with pertinent information if needed.  Past Surgical History:   Procedure Laterality Date     C ANESTH,DX ARTHROSCOPIC PROC KNEE JOINT  05    Left knee, with partial meniscectomy.     HC REMOVE TONSILS/ADENOIDS,<11 Y/O      T & A <12y.o.     HC UGI ENDOSCOPY DIAG W BIOPSY  09       Prior to Admission Medications   Prior to Admission Medications   Prescriptions Last Dose Informant Patient Reported? Taking?   ORDER FOR DME   Yes No   Si Device Respironics System One Auto CPAP @ 8 cm .    aspirin 81 MG tablet 2019 at 0800  Yes Yes   Sig: Take 1 tablet by mouth daily.   atorvastatin (LIPITOR) 20 MG tablet 2019 at 0800   No Yes   Sig: TAKE ONE TABLET BY MOUTH ONCE DAILY   buPROPion (WELLBUTRIN SR) 150 MG 12 hr tablet 1/12/2019 at 2100  No Yes   Sig: TAKE ONE TABLET BY MOUTH TWICE A DAY   finasteride (PROSCAR) 5 MG tablet 1/12/2019 at 0800  No Yes   Sig: TAKE ONE TABLET BY MOUTH ONCE DAILY   ibuprofen (ADVIL,MOTRIN) 600 MG tablet More than a month at Unknown time  No No   Sig: Take 1 tablet (600 mg) by mouth every 6 hours as needed for other (mild pain)   omeprazole (PRILOSEC) 20 MG CR capsule 1/12/2019 at 0800  No Yes   Sig: TAKE ONE CAPSULE BY MOUTH ONCE DAILY   oxybutynin (DITROPAN) 5 MG tablet 1/12/2019 at 2100  No Yes   Sig: TAKE ONE TABLET BY MOUTH TWICE A DAY   ranitidine (ZANTAC) 300 MG tablet 1/12/2019 at 2100  No Yes   Sig: TAKE ONE TABLET BY MOUTH AT BEDTIME   tamsulosin (FLOMAX) 0.4 MG capsule 1/12/2019 at 0800  No Yes   Sig: Take 1 capsule (0.4 mg) by mouth daily      Facility-Administered Medications: None     Allergies   Allergies   Allergen Reactions     No Known Allergies        Social History   I have reviewed this patient's social history and updated it with pertinent information if needed. Dean Mccarty  reports that he quit smoking about 49 years ago. His smoking use included cigarettes. He has a 50.00 pack-year smoking history. he has never used smokeless tobacco. He reports that he does not drink alcohol or use drugs.    Family History   I have reviewed this patient's family history and updated it with pertinent information if needed.   Family History   Problem Relation Age of Onset     Diabetes Maternal Grandmother        Review of Systems   The 10 point Review of Systems is negative other than noted in the HPI or here.     Physical Exam   Temp: 99.5  F (37.5  C) Temp src: Oral BP: 149/87 Pulse: 110 Heart Rate: 110 Resp: 16 SpO2: 98 % O2 Device: None (Room air)    Vital Signs with Ranges  Temp:  [97.4  F (36.3  C)-99.5  F (37.5  C)] 99.5  F (37.5  C)  Pulse:  [108-113] 110  Heart Rate:  [104-114]  110  Resp:  [8-20] 16  BP: ()/() 149/87  SpO2:  [95 %-98 %] 98 %  218 lbs 14.67 oz     Exam:  Constitutional: healthy, alert and no distress  Head: Normocephalic. No masses, lesions, tenderness or abnormalities  Neck: Neck supple. No adenopathy. Thyroid symmetric, normal size,, Carotids without bruits.  ENT: ENT exam normal, no neck nodes or sinus tenderness  Cardiovascular: negative, PMI normal. No lifts, heaves, or thrills. RRR. No murmurs, clicks gallops or rub  Respiratory: negative, Percussion normal. Good diaphragmatic excursion. Lungs clear  Gastrointestinal: Abdomen soft, non-tender. BS normal. No masses, organomegaly  : Deferred  Musculoskeletal: right hip - tenderness in greater trochanteric region, extremity is externally rotated and is short compared to left.  Skin: no suspicious lesions or rashes  Neurologic: non focal  Psychiatric: mentation appears normal and affect normal/bright  Hematologic/Lymphatic/Immunologic: Normal cervical lymph nodes  Data   Data reviewed today:  I personally reviewed the EKG tracing showing sinus tachycardia and the X-ray right hip with pelvis image(s) showing comminuted fracture.  Recent Labs   Lab 01/13/19  1813   WBC 15.7*   HGB 13.5   MCV 90         POTASSIUM 3.9   CHLORIDE 110*   CO2 23   BUN 18   CR 1.35*   ANIONGAP 10   ALEJO 8.9   *   ALBUMIN 3.6   PROTTOTAL 6.9   BILITOTAL 0.3   ALKPHOS 98   ALT 18   AST 18       Recent Results (from the past 24 hour(s))   XR Chest 1 View    Narrative    CHEST ONE VIEW 1/13/2019 6:47 PM     HISTORY: Trauma, pre-op.     COMPARISON: 2/21/2017    FINDINGS: Small right upper lobe nodule, stable dating back to  1/19/2013. Aortic calcification. There appears to be a hiatal hernia.      Impression    IMPRESSION:  No acute consolidation.   XR Pelvis and Hip Right 2 Views    Narrative    PELVIS AND HIP RIGHT TWO VIEWS   1/13/2019 6:48 PM     HISTORY:  Trauma.    COMPARISON: Pelvis 8/1/2016    FINDINGS:  Degenerative change of the lower lumbar spine.      Impression    IMPRESSION: Comminuted intertrochanteric right hip fracture with  displacement. There is also displacement of the lesser trochanter.

## 2019-01-14 NOTE — CONSULTS
Consult Date:  01/14/2019      REQUESTING PHYSICIAN:  Dr. Sadie Manuel      REASON FOR CONSULTATION:  Right hip fracture.      DESCRIPTION OF PROCEDURE:  This is a 89-year-old male admitted through the ER last evening after a ground level fall, slip on ice injury.  He was subsequently brought to the ER and diagnosed with a displaced right hip fracture.  He denies any loss of consciousness.  Denies any other injury.  He had no preexisting hip issues.  Currently, evaluated with the family at the bedside.  Resting comfortably.        PAST MEDICAL HISTORY:  History of chronic ischemic heart disease, depression, osteoarthritis of the knees, hypertension, prostate hyperplasia.      PAST SURGICAL HISTORY:  Includes a left knee arthroscopy, adenoids and EGD.        HIVCE-HZ-VAYBFUTPL MEDICATIONS:  Include aspirin, Lipitor, Wellbutrin, Proscar, ibuprofen, Prilosec, Ditropan, Zantac, Flomax.      ALLERGIES:  No known drug allergies.      SOCIAL HISTORY:   He resides with his wife.  He quit smoking 49 years ago.  He does not drink alcohol or use illicit drugs and ambulates independently.        FAMILY HISTORY:  Includes a maternal grandmother with diabetes.       REVIEW OF SYSTEMS:  A 10-point review of systems was performed, otherwise unremarkable as per HPI.      PHYSICAL EXAMINATION:  He is awake, alert, in no acute distress.  He is nontoxic in appearance, pleasant, conversant.     VITAL SIGNS:  Preop temperature is 98.9.  Blood pressure is 92/60, 16 respiratory rate, 95% room air saturation.   HEENT:  Atraumatic, normocephalic.  He has no cervical spine pain with palpation or range of motion.     EXTREMITIES:  Bilateral upper extremities, he has near full motion with no pain with palpation or motion.  The overlying skin is intact with no acute excoriation or breakdown.   CHEST:  Equal chest rise and fall, no audible wheezing or retraction.   ABDOMEN:  Soft, nontender, nondistended.     MUSCULOSKELETAL:  Left lower  extremity, he is able perform a straight leg raise with no pain.  He has no peripheral edema.  He has a faintly palpable dorsalis pedis pulse.  The foot is warm and dry with good cap refill.  The right side is in a shortened and externally rotated posture.  He has no pain from the ankle up to the mid-thigh region.  The overlying skin is intact.  There is no peripheral edema.  Again, he has a faintly palpable dorsalis pedis pulse.  Sensation is intact to light touch.  He has no knee effusion or evidence of instability.  The hip has some tenderness to general palpation.  Motion not tested.        IMAGING:  X-rays reviewed, show a comminuted intertrochanteric hip fracture.  There was some significant displacement to the lesser trochanter.      IMPRESSION:  This is a closed comminuted displaced intertrochanteric hip fracture in a 89-year-old male.      PLAN:  I discussed the option with the patient and the patient's family who were at the bedside.  Given his normal independent ambulation status and location of the fracture, I have recommended operative fixation in order return him back to pre-injury function.  The risks, benefits and complications were reviewed, risks including bleeding, infection, neurovascular injury, fracture, blood clots, heart attacks, and strokes discussed.  We also discussed blood clots at length.  We also reviewed the postoperative timeframe for recovery.  Once this was thoroughly reviewed, he opted to proceed.  I would anticipate the need for a rehab/nursing home placement postoperatively.  History and physical reviewed.  He has been medically optimized.  Plan to proceed in the near future.         VARSHA THAKUR DO             D: 2019   T: 2019   MT: LUZ ELENA      Name:     EVA ADAMS   MRN:      [id number]        Account:       [id number]   :      1928           Consult Date:  2019      Document: [id number]

## 2019-01-14 NOTE — ED NOTES
ED Nursing criteria listed below was addressed during verbal handoff:     Abnormal vitals: Yes  Abnormal results: Yes  Med Reconciliation completed: No ( pt could not verify all by name and dosage. Wife to bring meds in am)  Meds given in ED: Yes  Any Overdue Meds: N/A  Core Measures: N/A  Isolation: N/A  Special needs: Yes ( [place], fall risk)  Skin assessment: No ( have not assessed coccyx area. Will have med/surg check on transfer to bed upstairs)    Observation Patient  Education provided: N/A

## 2019-01-14 NOTE — PLAN OF CARE
S-(situation): end of shift note    B-(background): right hip fracture    A-(assessment): VSS, slight temp of 99.8.  Rating pain 5/10, dilaudid given x3.  Stating pain comes and goes, muscles twitching.      R-(recommendations): Continue to monitor and follow POC.

## 2019-01-14 NOTE — OR NURSING
1255: Transfer from  PACU to Room 249  Transferred via bed     S: 89 y/o male  S/P right hip ORIF       Anesthesia Type:  General anesthesia       Surgeon:  Dr. Villareal       Allergies:  See Medication Reconciliation Record       DNR: NO       B:  Pertinent Medical History:   Past Medical History:   Diagnosis Date     Chronic ischemic heart disease, unspecified     Coronary artery dis     Depressive disorder, not elsewhere classified      Primary osteoarthritis of both knees      Unspecified essential hypertension      Unspecified hyperplasia of prostate      Weakness generalized 2/21/2017                Surgical History:    Past Surgical History:   Procedure Laterality Date     C ANESTH,DX ARTHROSCOPIC PROC KNEE JOINT  12/12/05    Left knee, with partial meniscectomy.     HC REMOVE TONSILS/ADENOIDS,<11 Y/O      T & A <12y.o.     HC UGI ENDOSCOPY DIAG W BIOPSY  08/25/09     ORTHOPEDIC SURGERY      right ankle surgery       A:  EBL: 100 ml        IVF:  1500 ml LR        UOP:  Per etienne        NPO:  ___Yes _x__No; tolerating ice chips         Vomiting:  ___Yes __x_No         Drainage: none noted        Skin Integrity: scab top of right head; right hip incision        RFO: __x_Yes___No; etienne cath               Brace/sling/equipment:  _x__Yes___No; myrna stockings and pneumoboots       See PACU record for ongoing assessment, vital signs and pain assessment.    R: Post-Op vitals and assessments as ordered/indicated per patient's condition.       Follow Post-Op orders and notify Physician prn.       Continue to involve patient/family in plan of care and discharge planning.       Reinforce Pre-Operative education.       Implement skin safety interventions as appropriate.  Report called to Aleyda HOWARD RN, med/surg    Name: Priscilla Garcia RN, PACU

## 2019-01-14 NOTE — PROGRESS NOTES
01/14/19 1600   Quick Adds   Type of Visit Initial PT Evaluation       Present no   Living Environment   Lives With spouse   Living Arrangements house   Home Accessibility stairs to enter home;stairs within home  (6 stairs to enter with right HR, stairs downstairs)   Living Environment Comment Stairs within the home to the downstairs area however does not need to access. Wife Estelita is out of the house during the week for volunteering. Walk in shower. Flat bed.    Self-Care   Usual Activity Tolerance good   Current Activity Tolerance poor   Activity/Exercise/Self-Care Comment Patient walks frequently, manages home tasks such as shoveling. Has a SPC in the home, 4WW and standard walker in the home previously used following TKA and are his inlaws equipment.    Functional Level Prior   Ambulation 0-->independent   Transferring 0-->independent   Toileting 0-->independent   Bathing 0-->independent   Communication 0-->understands/communicates without difficulty   Swallowing 0-->swallows foods/liquids without difficulty   Cognition 0 - no cognition issues reported   Fall history within last six months yes   Number of times patient has fallen within last six months 1   Which of the above functional risks had a recent onset or change? ambulation;transferring;fall history   Prior Functional Level Comment Patient slipped on the ice on the driveway resulting in hip fracute. History of impaired balance within the home according to the wife.    General Information   Onset of Illness/Injury or Date of Surgery - Date 01/13/19   Referring Physician Dr. Villareal   Patient/Family Goals Statement TCU placement   Pertinent History of Current Problem (include personal factors and/or comorbidities that impact the POC) Patient is a 89 year old male day 1 status post fall resulting in right closed comminuted displaced IT hip fracture, day 0 status post right CRIF hip nailing. Patient has a previous medical history of  chronic ischemic heart disease, OA bilateral knees, depression, anxiety, HTN and history of left TKA.    Precautions/Limitations fall precautions   Weight-Bearing Status - LUE full weight-bearing   Weight-Bearing Status - RUE full weight-bearing   Weight-Bearing Status - LLE full weight-bearing   Weight-Bearing Status - RLE (foot flat transfers only)   General Observations Patient and wife asleep upon PT arrival, agreeable to PT evaluation. Patient on 1L O2 at rest with 98% sats.   General Info Comments PT orders: evaluate and treat post operative hip. Activity orders: foot flat weight bearing for transfers only, ice, PCDs, elevate and incentive spirometer.    Cognitive Status Examination   Orientation person;place   Level of Consciousness confused;alert   Follows Commands and Answers Questions 50% of the time;able to follow single-step instructions   Personal Safety and Judgment impaired   Memory impaired   Pain Assessment   Patient Currently in Pain No   Integumentary/Edema   Integumentary/Edema Comments Edema RLE, non-pitting   Posture    Posture Forward head position;Protracted shoulders;Kyphosis   Range of Motion (ROM)   ROM Comment Bilat UE and LLE WFL. Unable to complete right hip AROM due to pain   Strength   Strength Comments Bilat UE 4+/5, LLE 4/5 throughout   Bed Mobility   Bed Mobility Comments Attempted to sit EOB, however patient unable to sequence scooting in bed and actively maneuver RLE in bed. Opted to defer until cognitively cleared tomorrow.   Transfer Skills   Transfer Comments Did not assess   Gait   Gait Comments Unable   Balance   Balance Comments Unable to assess   Sensory Examination   Sensory Perception no deficits were identified   Muscle Tone   Muscle Tone no deficits were identified   Modality Interventions   Planned Modality Interventions Cryotherapy   Planned Modality Interventions Comments Right hip PRN   General Therapy Interventions   Planned Therapy Interventions bed mobility  "training;gait training;balance training;ROM;strengthening;home program guidelines   Clinical Impression   Criteria for Skilled Therapeutic Intervention yes, treatment indicated   PT Diagnosis pain, right hip stiffness, muscle weakness, fear of RLE use   Influenced by the following impairments Day 0 status post right hip nailing and day 1 status post fall   Functional limitations due to impairments impaired functional mobility, impaired cognition and understanding of instructions, decreased activity tolerance   Clinical Presentation Evolving/Changing   Clinical Presentation Rationale Patient with increased pain upon PROM to right hip.    Clinical Decision Making (Complexity) Moderate complexity   Therapy Frequency` daily   Predicted Duration of Therapy Intervention (days/wks) 2-3 days   Anticipated Equipment Needs at Discharge (defer to TCU)   Anticipated Discharge Disposition Transitional Care Facility   Risk & Benefits of therapy have been explained Yes   Patient, Family & other staff in agreement with plan of care Yes   Clinical Impression Comments Patient unable to mobilize this date. Due to weight bearing status patient will require placement to ensure compliance and safely progress him towards return home. Patient would benefit from continued skilled therapeutic intervention in order to progress them towards a higher level of function in accordance with their surgeon's protocol.   [facility] [employer] TM \"6 Clicks\"   2016, Trustees of [facility], under license to [employer].  All rights reserved.   6 Clicks Short Forms Basic Mobility Inpatient Short Form   [facility]  \"6 Clicks\" V.2 Basic Mobility Inpatient Short Form   1. Turning from your back to your side while in a flat bed without using bedrails? 2 - A Lot   2. Moving from lying on your back to sitting on the side of a flat bed without using bedrails? 1 - Total   3. Moving to and from a bed to a chair (including a wheelchair)? 1 " - Total   4. Standing up from a chair using your arms (e.g., wheelchair, or bedside chair)? 1 - Total   5. To walk in hospital room? 1 - Total   6. Climbing 3-5 steps with a railing? 1 - Total   Basic Mobility Raw Score (Score out of 24.Lower scores equate to lower levels of function) 7   Total Evaluation Time   Total Evaluation Time (Minutes) 10     Thank you for your referral.    Britni Pelayo, PT, DPT, ATC    [facility]    O: [phone]  E: [email or web address]

## 2019-01-15 PROBLEM — M79.652 PAIN OF LEFT THIGH: Status: ACTIVE | Noted: 2019-01-01

## 2019-01-15 NOTE — PLAN OF CARE
VSS with slightly elevated temperature this morning of 100.5. Tylenol given for fever and pain. MD notified during rounds. Patient is pleasantly confused, slow to respond at times and not oriented to time all the time. Patient is very aware that he is confused but has appeared to improve since last evening. Negative on delirium scale. Up with PT to chair using walker, gait belt and assist of 2 with directions given frequently to not put weight on right foot- proved to be difficult so recommendations for liza steady. Adequate pain control with tylenol and occasionally oxycodone. Right hip and leg dressing clean dry and intact with very small amount of drainage marked from nights- has not increased. Incontinent of urine this morning; continues on IVF for adequate hydration. Will continue to monitor.

## 2019-01-15 NOTE — OP NOTE
Procedure Date: 01/14/2019      1st Assistant:  João Jean PA-C (who was utilized throughout the procedure, assisting with soft tissue retraction, assisting with reduction and he provided skin closure).      2nd Assistant:             PREOPERATIVE DIAGNOSIS:  Right comminuted displaced intertrochanteric hip fracture.       POSTOPERATIVE DIAGNOSIS:  Right comminuted displaced intertrochanteric hip fracture.       PROCEDURE:   Right hip closed reduction and internal fixation with cephalomedullary fixation device.       SURGEON:  Hernandez Villareal DO      ANESTHESIA:  General.      COMPLICATIONS:  None.      ESTIMATED BLOOD LOSS:  Less 100 mL.      FLUIDS:  1 liter of crystalloids.      URINE OUTPUT:  350 mL.      IMPLANT:  [place] gamma nail 11 mm x 400 mm x 125 degrees with a 115 mm lag bolt.       COUNTS:  Correct.      DISPOSITION:  PACU in stable condition.      GROSS FINDINGS:  There was a displaced comminuted intertrochanteric right hip fracture.  There was some comminution of the greater trochanter as well as with the lesser trochanter.      NOTE:  This is a 89-year-old male who had a ground level fall on ice sustaining the above injury.  Normally, independent ambulator.  We discussed his treatment options with him, his wife, and his son, who are at the bedside.  Given his normal ambulating status location of the fracture, and desire to return back to function, we discussed operative fixation.  I reviewed the risks, benefits, complications, and alternatives.  Risks including bleeding, infection, fracture hardware-related issues, blood clots.  Timeframe for recovery discussed.  Once this was thoroughly reviewed, he opted to proceed.      He was met preoperatively, again informed consent was verified, appropriate extremity was marked.  He was wheeled to OP suite #3.  When deemed appropriate by Anesthesia, he was transferred to the fracture table with no issues.  The central post was placed.  His right leg was placed  in the traction boot.  His left leg placed in a stirrup with care to prevent any undue pressure in the peritoneum and the leg.  The leg was then manipulated with the traction device and under closed means the fracture was reduced with no issues on AP and lateral imaging.  It was noted that the lesser trochanter was displaced.  However, based on his age, I opted to forego any further dissection on it.  The right leg was then sterilely prepped and draped in normal manner.  Prior to incision, a time-out was performed again confirming the person, extremity, and surgery.  Antibiotics had been delivered.  A longitudinal skin incision just proximal to the greater trochanter was performed.  The fascia was split in line.  A guide pin was then placed by hand on the greater trochanter just along the tip.  Under AP imaging, this was advanced into the bone.  There was some comminution throughout this area.  Lateral confirmed to be in acceptable position.  With this completed, a tissue guide was placed and the opening reamer was used in the proximal greater trochanter region.  A guidewire was then placed down to the level of the knee.  It was then measured, measuring approximately 415.  I opted for the 400 nail.  The 400 nail was then placed with no issues, advanced by hand.  With the imaging confirmed, the lateral outrigger device was placed.  The appropriate skin entry point on the lateral thigh was found.  The skin was sharply incised and the guide was placed down to the level of the bone.  A guide pin was then advanced just inferior to midline into the femoral neck.  Lateral confirmed to be acceptable trajectory.  With this advanced and measured a reamer was then used over the pin.  With that complete, the leg bolt was placed.  It had fair purchase into the bone.  Once placed, I was able to compress the bone at the fracture site.  The locking bolt was placed into the mechanism.  The final imaging was obtained showing  acceptable reduction with no prominence of the hardware.  The wounds were irrigated.  The IT and fascia closed with 0 Vicryl, followed with 2-0 Vicryl subcutaneous, followed by staples in the skin.  A sterile bandage applied and he subsequently transferred to PACU in stable condition.      He will be admitted back to the hospital for orthopedic care, DVT prophylaxis, and pain management.         VARSHA THAKUR DO             D: 2019   T: 01/15/2019   MT: NEIL      Name:     EVA ADAMS   MRN:      [id number]        Account:        [id number]   :      1928           Procedure Date: 2019      Document: [id number]

## 2019-01-15 NOTE — PLAN OF CARE
S-(situation): end of shift note    B-(background): right hip ORIF    A-(assessment): Pt's etienne leaking so was pulled at 0300, pt instructed to use call light for help with the urinal.  Pt remains confused, stating staff should get a hold of his family so they can have a new years rivera party.  When pt awake, was reoriented.  Pain medication given with turns, sleeping in between.    R-(recommendations): Continue with POC, monitor confusion, PT eval.

## 2019-01-15 NOTE — PROGRESS NOTES
"Received call from nursing, patient complaining of more left sided hip pain than right.  Saw and examined patient.  Patient not sure when the left hip started hurting, thinks he was having it since the fall that fractured his right hip, but did not really notice it until today because the right hip was hurting so much.  Today was the first time physical therapy was able to get patient to stand and they stated he would not bear weight on the left hip.  Patient then started mentioning that his left hip has been hurting.  Patient describes it as a burning, throbbing ache \"deep\" in the \"neck of the hip\".  Inner thigh/groin area. Does not radiate, no numbness/tingling.  Worse with movement, weight bearing.   /48   Pulse 96   Temp 100  F (37.8  C) (Oral)   Resp 16   Ht 1.829 m (6')   Wt 99.3 kg (218 lb 14.7 oz)   SpO2 93%   BMI 29.69 kg/m    Exam: left hip no bruising, swelling, skin breakdown. No rash.  Non-tender through greater trochanteric, lateral, anterior thigh. No focal or specific tenderness.  No tenderness or pain at knee.  Posterior calf is soft-nontender.  Mild pain with hip flexion >90.  Denies pain with external rotation, internal rotation recreates pain.  Distal neurovascular grossly intact.  Sensation intact.  Palpable pulse.  Skin pink warm dry.  Good cap refill.  Able to df/pf without pain.        Will order left hip dedicated imaging. Did get pelvis/right hip imaging initially but this only shows an AP of the left hip.    Discussed with on-call surgeon Dr. Stanford.  Per Dr. Stanford will take a look at the plain films tonight and re-evaluate patient tomorrow AM.  Will consider further imaging if plan films negative.   Discussed with medicine traumatic rhabdomyolysis in differential as well as tendon, ligament injury, contusion, and occult fracture. Medicine is starting work up as well.     For now, non-weight bearing to the left hip, will further eval tomorrow.    RENETTA Haddad, " CNP  Orthopedic Surgery

## 2019-01-15 NOTE — PROGRESS NOTES
01/15/19 0830   Quick Adds   Type of Visit Initial Occupational Therapy Evaluation   Living Environment   Lives With spouse   Living Arrangements house   Living Environment Comment Patient reported that he lives in a home with wife.  He did report that his 13 year old grandson lives in the home sometimes.  He does have a walk-in shower.     Functional Level   Ambulation 0-->independent   Transferring 0-->independent   Toileting 0-->independent   Bathing 0-->independent   Dressing 0-->independent   Eating 0-->independent   Communication 0-->understands/communicates without difficulty   Swallowing 0-->swallows foods/liquids without difficulty   Cognition 0 - no cognition issues reported   Prior Functional Level Comment Patient reported that he was very independent.  He does like to go to [employer] and out to eat.     General Information   Onset of Illness/Injury or Date of Surgery - Date 01/13/19   Referring Physician Dr. Villareal   Patient/Family Goals Statement Patient reported that he does not have a goal.     Additional Occupational Profile Info/Pertinent History of Current Problem Patient is a 89 year old male who underwent Right hip ORIF s/p hip fracture.  Prior patient was independent with BADL/IADL.  He enjoys going to [employer] and out to eat with his wife.  Past medical history of chronic ischemic heart disease, hyperlipidemia, hypertension, adjustment disorder with anxiety, depression, and hyperplasia of prostate.    Precautions/Limitations fall precautions   Weight-Bearing Status - RLE other (see comments)  (Flat foot for transfers only)   Cognitive Status Examination   Orientation orientation to person, place and time   Level of Consciousness alert   Follows Commands (Cognition) follows two step commands;increased processing time needed   Memory intact   Attention No deficits were identified   Cognitive Comment Patients answers to some questions were tangential and didn't answer the question.  He was able to  answer simple questions without difficulty.     Visual Perception   Visual Perception Wears glasses   Range of Motion (ROM)   ROM Comment WFL BUE   Strength   Strength Comments WFL BUE   Bathing   Level of [place] maximum assist (25% patients effort)   Physical Assist/Nonphysical Assist 2 person assist;set-up required;supervision;verbal cues   Upper Body Dressing   Level of [place]: Dress Upper Body moderate assist (50% patients effort)   Physical Assist/Nonphysical Assist: Dress Upper Body set-up required;supervision;1 person assist;verbal cues   Lower Body Dressing   Level of [place]: Dress Lower Body maximum assist (25% patients effort)   Physical Assist/Nonphysical Assist: Dress Lower Body set-up required;supervision;verbal cues;2 person assist   Toileting   Level of [place]: Toilet moderate assist (50% patients effort)   Physical Assist/Nonphysical Assist: Toilet set-up required;supervision;verbal cues;2 person assist   Grooming   Level of [place]: Grooming minimum assist (75% patients effort)   Physical Assist/Nonphysical Assist: Grooming set-up required;supervision;verbal cues;1 person assist;other (see comments)  (while seated)   Eating/Self Feeding   Level of [place]: Eating independent   Instrumental Activities of Daily Living (IADL)   Previous Responsibilities meal prep;housekeeping;yardwork;driving   Activities of Daily Living Analysis   Impairments Contributing to Impaired Activities of Daily Living balance impaired;fear and anxiety;pain;post surgical precautions   General Therapy Interventions   Planned Therapy Interventions ADL retraining   Clinical Impression   Criteria for Skilled Therapeutic Interventions Met yes, treatment indicated   OT Diagnosis Decreased ability to complete BADL at prior level of function.    Influenced by the following impairments pain, post surgical precautions   Assessment of Occupational Performance 5 or more Performance Deficits   Identified  "Performance Deficits Dressing, bathing, toileting, driving, home management, yard work   Clinical Decision Making (Complexity) Moderate complexity   Therapy Frequency daily   Predicted Duration of Therapy Intervention (days/wks) 3 days   Anticipated Discharge Disposition Transitional Care Facility   Risks and Benefits of Treatment have been explained. Yes   Patient, Family & other staff in agreement with plan of care Yes   [facility] TM \"6 Clicks\"   2016, Trustees of [facility], under license to [employer].  All rights reserved.   6 Clicks Short Forms Daily Activity Inpatient Short Form   [facility]  \"6 Clicks\" Daily Activity Inpatient Short Form   1. Putting on and taking off regular lower body clothing? 2 - A Lot   2. Bathing (including washing, rinsing, drying)? 2 - A Lot   3. Toileting, which includes using toilet, bedpan or urinal? 2 - A Lot   4. Putting on and taking off regular upper body clothing? 2 - A Lot   5. Taking care of personal grooming such as brushing teeth? 3 - A Little   6. Eating meals? 4 - None   Daily Activity Raw Score (Score out of 24.Lower scores equate to lower levels of function) 15   Total Evaluation Time   Total Evaluation Time (Minutes) 18     VIVIEN Mccoy/L  [facility]  [phone]      "

## 2019-01-15 NOTE — PROGRESS NOTES
"[facility]    Medicine Progress Note - Hospitalist Service       Date of Admission:  1/13/2019  Assessment & Plan      Closed comminuted fracture of right hip (H)  POD #1  Pain is managed in right hip  pain management with dilaudid and Norco      Left hip pain  Left Thigh Pain - new since patient tried to stand with physical therapy.  Patient describes it as a burning, throbbing ache \"deep\" in the \"neck of the hip\".  Inner thigh/groin area. Does not radiate, no numbness/tingling.  Worse with movement, weight bearing.    left hip no bruising, swelling, skin breakdown. No rash.  Non-tender through greater trochanteric, lateral, anterior thigh. No focal or specific tenderness.  No tenderness or pain at knee.  Posterior calf is soft-nontender.  Mild pain with hip flexion >90.  Denies pain with external rotation, internal rotation recreates pain.  Distal neurovascular grossly intact.  Sensation intact.  Palpable pulse.  Skin pink warm dry.  Good cap refill.     XR ordered - Ortho will follow up    Plan for medical evaluation, possible rhabdomyolysis. Patient was down for 1-1.5 hours outside on the day of the fall and he crawled around the house and up to the deck. Continue IVF.      CK elevated. Will continue IV Fluids and recheck labs in the morning.         Chronic ischemic heart disease    Assessment: stable    Plan: EKG- sinus tachycardia      Hyperlipidemia LDL goal <100    Assessment: stable    Plan: continue home dose statin      Hypertension goal BP (blood pressure) < 140/80    Assessment: stable    Plan: continue home meds      Adjustment disorder with anxiety    Assessment: stable    Plan: continue home meds      Hyperplasia of prostate    Assessment: he doesn't have any issues with urination right now    Plan: continue home meds      Diet: Regular Diet Adult  Room Service    DVT Prophylaxis: Pneumatic Compression Devices  Ordonez Catheter: not present    Disposition Plan "   Expected discharge: 2 - 3 days, recommended to transitional care unit once adequate pain management/ tolerating PO medications, hemoglobin stable, mental status at baseline and safe disposition plan/ TCU bed available.  Entered: Cary Mejia CNP 01/15/2019, 5:23 PM       The patient's care was discussed with the Attending Physician, Dr. Villareal, Bedside Nurse and Patient.    Cary Mejia CNP  Hospitalist Service  [facility]    ______________________________________________________________________    Interval History   Patient is mildly confused, alert, laying in bed. Patient states his left thigh hurts, and he is not able to stand due to pain. Patient hemodynamically stable. Low grade fever. Poor appetite.     Data reviewed today: I reviewed all medications, new labs and imaging results over the last 24 hours.      Physical Exam   Vital Signs: Temp: 100  F (37.8  C) Temp src: Oral BP: 121/48 Pulse: 96 Heart Rate: 100 Resp: 16 SpO2: 93 % O2 Device: None (Room air) Oxygen Delivery: 1 LPM  Weight: 218 lbs 14.67 oz  Constitutional: awake, alert, cooperative   Respiratory: No increased work of breathing, good air exchange, clear to auscultation bilaterally, no crackles or wheezing, Mild respiratory distress and diminished  Cardiovascular: regular rate and rhythm  GI: normal bowel sounds  Skin: pale, multiple areas of bruising  Musculoskeletal: significant right leg edema, incision is intact.  Neurologic: Awake, alert, oriented to name, place and time.      Data   Recent Labs   Lab 01/15/19  1805 01/15/19  0612 01/14/19  0519 01/13/19  1813   WBC 9.5  --  9.9 15.7*   HGB 8.2* 9.5* 10.9* 13.5   MCV 92  --  90 90   *  --  204 227     --  142 143   POTASSIUM 3.8  --  4.3 3.9   CHLORIDE 106  --  112* 110*   CO2 25  --  24 23   BUN 15  --  18 18   CR 0.97  --  1.09 1.35*   ANIONGAP 6  --  6 10   ALEJO 7.8*  --  7.9* 8.9   * 117* 128* 128*   ALBUMIN  --   --   --   3.6   PROTTOTAL  --   --   --  6.9   BILITOTAL  --   --   --  0.3   ALKPHOS  --   --   --  98   ALT  --   --   --  18   AST  --   --   --  18     Recent Results (from the past 24 hour(s))   XR Pelvis w Hip Left 1 View    Narrative    PELVIS WITH UNILATERAL HIP ONE VIEW LEFT  1/15/2019 5:45 PM     HISTORY: Left hip pain. Fall with a right hip fracture. Increasing  left hip pain and reluctance to bear weight.    COMPARISON: January 13, 2019      Impression    IMPRESSION: ORIF of an intertrochanteric fracture on the right noted  in near-anatomic alignment.    ANGELINE KAPOOR MD

## 2019-01-15 NOTE — PLAN OF CARE
Discharge Planner OT   Patient plan for discharge: Patient stated that he would like to go to [facility].    Current status: Patient reported that he was in pain and initially didn't want to move.  With assistance and education patient was able to sit edge of bed with mod A x 2 for safety.  Patient attempted LB dressing and was not able to complete.  Mod A x 1 and verbal cues to go from sitting edge of bed to supine.    Barriers to return to prior living situation: Increased need for assistance with BADL, dependence with IADL, weight bearing status.   Recommendations for discharge: TCU  Rationale for recommendations: To continue progress with increasing independence with BADL.        Entered by: Paige Denton 01/15/2019 8:50 AM

## 2019-01-15 NOTE — ANESTHESIA POSTPROCEDURE EVALUATION
Patient: Dean Mccarty    Procedure(s):  OPEN REDUCTION INTERNAL FIXATION HIP NAILING    Diagnosis:Dx: Right hip fracture  Diagnosis Additional Information: No value filed.    Anesthesia Type:  General, ETT    Note:  Anesthesia Post Evaluation    Patient location during evaluation: Bedside and Floor  Patient participation: Able to fully participate in evaluation  Level of consciousness: awake  Pain management: adequate  Airway patency: patent  Cardiovascular status: acceptable and hemodynamically stable  Respiratory status: acceptable, room air and nonlabored ventilation  Hydration status: stable  PONV: none     Anesthetic complications: None    Comments: Patient was happy with the anesthesia care received and no anesthesia related complications were noted.  I will follow up with the patient again if it is needed.        Last vitals:  Vitals:    01/14/19 2015 01/14/19 2300 01/15/19 0917   BP:  124/53 125/53   Pulse: 99 111 100   Resp: 16 16 16   Temp: 99.9  F (37.7  C) 100.5  F (38.1  C) 100.7  F (38.2  C)   SpO2: 93% 97% 96%         Electronically Signed By: RENETTA Pate CRNA  January 15, 2019  11:20 AM

## 2019-01-15 NOTE — PROGRESS NOTES
[facility]  Orthopedics Progress Note           Assessment and Plan:    Assessment:   Post-operative day #1 Procedure(s):  OPEN REDUCTION INTERNAL FIXATION HIP NAILING   Acute post-operative blood loss anemia-asymptomatic hgb 10.9 preop down to 9.5 post-op  Intermittent confusion, none this afternoon.          Plan:   Doing well.  No immediate surgical complications identified.  No excessive bleeding  Pain well-controlled.  Tolerating physical therapy and rehabilitation well.  Encourage IS  Start or continue physical therapy  Activity as tolerated  Toe touch for balance only.  Likely discharge tomorrow to Rehab.  Patient and spouse agreeable to this,  Advance diet as tolerated  Routine wound care  DVT Prophylaxis: Lovenox, SCD's, Compression Hose  No acute medical issues.            Interval History:   Doing well.  Continues to improve.  Pain is well-controlled. States only painful with movement and even then medication makes the pain tolerable.  Low grade temp of 100.5 this AM. Encouraged IS, movement, hydration with tylenol. Patient denies currently issues. Recall events of the fall.  Has had reports of intermittent confusion, short term memory loss but easily re-orient per nursing. Cooperative and friendly.  Per physical therapy and Occupational Therapy recommendations for TCU.  Discussed this with patient and spouse, they have steps at their house and are both agreeable to TCU.  Patient is only flat foot for transfers only and has required max assist x 2, has been somewhat non-compliant with recommendations, not remembering restrictions.             Review of Systems:    The patient denies any chest pain, shortness of breath, excessive pain, fever, chills, purulent drainage from the wound, nausea or vomiting.               Physical Exam:   General: awake, alert, appropriate to person, place, events, and spouse and in no acute distress  Blood pressure 125/53, pulse 100, temperature 100.7  F (38.2   C), temperature source Oral, resp. rate 16, height 1.829 m (6'), weight 99.3 kg (218 lb 14.7 oz), SpO2 96 %.  Right hip:  Dressing clean, dry and intact. Surrounding skin intact, no breakdown. Calf is soft, nontender with no significant swelling. Distal neurovascular grossly intact.  Compartments soft and non-tender.  2+ distal pulse, sensation intact to foot, able to df/pf against resistance. Brisk cap refill.            Data:     Results for orders placed or performed during the hospital encounter of 01/13/19 (from the past 24 hour(s))   Hemoglobin   Result Value Ref Range    Hemoglobin 9.5 (L) 13.3 - 17.7 g/dL   Glucose   Result Value Ref Range    Glucose 117 (H) 70 - 99 mg/dL     RENETTA Haddad, CNP  Orthopedic Surgery

## 2019-01-15 NOTE — CONSULTS
CARE TRANSITION SOCIAL WORK INITIAL ASSESSMENT:      Met with: Patient.    DATA  Principal Problem:    Closed comminuted fracture of right hip (H)  Active Problems:    Chronic ischemic heart disease    Hyperlipidemia LDL goal <100    Hypertension goal BP (blood pressure) < 140/80    Adjustment disorder with anxiety    Hyperplasia of prostate    Closed right hip fracture (H)       Primary Care Clinic Name: [facility]   Primary Care MD Name: Dr. Yoo   Contact information and PCP information verified: Yes      ASSESSMENT  Cognitive Status: awake, alert and oriented.       Resources List: Skilled Nursing Facility              Description of Support System: Supportive, Involved   Who is your support system?: Wife, Children       Insurance Concerns: No Insurance issues identified        This writer met with patient and his wife, Estelita and introduced self and role. Discussed discharge planning and medicare guidelines in regards to home care and SNF benefits. Discussed current recommendation for TCU placement.  Patient and wife in agreement.  Patient was provided with Medicare certified nursing home list. Pts choices are as follows [facility] (Main Phone: [phone] Admissions Phone: [phone] Fax: [phone]) [facility]  (Phone: [phone] Fax: [phone]) [facility] (Admissions: [phone] Main Phone: [phone] Fax: [phone]) and [facility].      Discussed that [facility] is full and does not anticipate any male openings until the end of next week. Discussed that [facility] has a shared room in Long Term Care and patient and wife not interested.    Discussed that [facility] has a private room with a private bathroom with a $14.00 private room charge.  Patient and wife in agreement with this option and writer has confirmed the bed at [facility] for tomorrow.     Wife is requesting van transport and is aware of the private cost of  $95.00 for this.  TCU bed is not available at [facility] until after 1pm tomorrow, so writer has arranged [employer] van for transport at 1pm.          PLAN    Patient will discharge to TCU at [facility] when medically stable. Anticipate possible discharge for tomorrow.          CRISTÓBAL Hogan

## 2019-01-15 NOTE — PLAN OF CARE
Discharge Planner PT   Patient plan for discharge: TCu  Current status: Patient required constant physical and verbal cueing to complete RLE post operative HEP. Patient with no recall of WB status. Pain expressed with AAROM hip flexion to 60 degrees. Rolling right with mod assist, rolling left with max assist using bed rails throughout. Patient seeking to pull on staff for transitions and required physical placement of hands on surfaces to pull up on. Unable to complete supine to sitting up in bed without HOB elevated maximally, max assist x 2 for RLE and lower trunk transition to EOB. Verbal cues for sitting EOB trunk control, progressed to IND sitting balance. Sit to stand with bed heigh elevated, mod assist x 2 for lift off, hand placement and to stand erect. Patient unable to maintain WB status with pivot transfer from bed to chair on the strong side. Max assist x 2 with 2WW stand pivot transfer with constant physical and verbal cues to complete pivot on LLE and FFWB RLE, non-compliant throughout. Min assist scooting back in the chair and max assist RLE elevation, refused ice when sitting up in recliner.     Nursing to use liza steady for transfers bed to/from chair.    Barriers to return to prior living situation: Medical status, WB status, impaired functional mobility, decreased activity tolerance, safety concerns with transitional movement  Recommendations for discharge: TCU  Rationale for recommendations: Patient remains with impaired insight as well as memory of WB status and hip fracture/surgery. Patient unable to safely transfer with WB restriction. Patient would benefit from placement for safe discharge and skilled therapeutic intervention to progress him towards higher level of function, safely, in accordance with his surgeon's protocol.       Entered by: Britni Pelayo 01/15/2019 11:47 AM

## 2019-01-15 NOTE — PROGRESS NOTES
Discharge Planner   Discharge Plans in progress: Yes. Patient to discharge to the TCU at [facility]. Anticipate discharge on 1/16/19.  [employer] transport at 1300 on 1/16/19 arranged.   Barriers to discharge plan: None   Follow up plan: per Ortho        Entered by: JODEE DHALIWAL 01/15/2019 4:09 PM

## 2019-01-15 NOTE — PLAN OF CARE
S-(situation): end of shift note    B-(background): R hip ORIF    A-(assessment): VSS, slight temp, declined pain medication.  Scant drainage on dressing and this was marked.  Pt is confused, unable to state the date, place, and time.  He thinks its the day time.       R-(recommendations): Continue with POC and reorientation.

## 2019-01-16 NOTE — PROGRESS NOTES
"[facility]  Orthopedics Progress Note    Patient seen and examined with Dr. Stanford         Assessment and Plan:    Assessment:   Post-operative day #2 Procedure(s):  OPEN REDUCTION INTERNAL FIXATION HIP NAILING   Acute post-operative blood loss anemia-asymptomatic   Intermittent confusion  Left hip pain  Elevated CK: trending downward from yesterday, medicine managing.      Plan:   Doing well.  No immediate surgical complications identified.  No excessive bleeding  Pain well-controlled.  Refusing weight bearing on the left hip.  Encourage IS  Start or continue physical therapy  Activity as tolerated  Toe touch for balance only on right. Per Dr. Stanford ok to bear weight on the left if he tolerates this.   Discharge pending on MRI imaging findings; TCU placement. .  Advance diet as tolerated  Routine wound care  DVT Prophylaxis: Lovenox, SCD's, Compression Hose  No acute medical issues.            Interval History:   Began to complain of more left sided hip pain since yesterday, thinks he has had the left sided hip since the fall, however his right side was more bothersome previously now he is noticing the left side.  No other reported trauma since hospitalization.  Plain films done yesterday do not show any fracture to the left hip. He states the pain is burning/throb to the groin/inner thigh area. \"deep\".  Worse with attempting weight bearing.  This AM states the right hip is little more bothersome than left but states the pain \"trades off\" \"when one is bad, the other is ok and vice versa\".  No other pains. Denies back pain. Denies numbness/tingling. Denies radiating pain. Denies previous hx of this kind of pain.  CK was 1489 last night, and 1251 today. Spoke with medicine, recommend continue IV fluids and continue to monitor.    Discussed with covering orthopedic surgeon. Will obtain MRI of the left hip to help rule out occult fracture, iliopsoas tear/strain, or other causes of pain. In the meantime " ok to weight bear as tolerated per surgeon.            Review of Systems:    The patient denies any chest pain, shortness of breath, excessive pain, fever, chills, purulent drainage from the wound, nausea or vomiting.               Physical Exam:   General: awake, alert, appropriate to person events this AM.  Not orient to location and date.  Resting in bed, in no acute distress.   Blood pressure 125/55, pulse 89, temperature 98.1  F (36.7  C), temperature source Oral, resp. rate 16, height 1.829 m (6'), weight 99.3 kg (218 lb 14.7 oz), SpO2 92 %.  Right hip:  Dressing clean, dry and intact. Surrounding skin intact, no breakdown. Dressing changed. Bulky dressing removed. Very mild dried sanginous drainage to dressings. No active drainage.  No bruising around 2x incision. No evidence of infection. Incision is approximated, staples intact. New sterile Aquacell placed. Calf is soft, nontender with no significant swelling. Distal neurovascular grossly intact.  Compartments soft and non-tender. Mild swelling to right leg.   2+ distal pulse, sensation intact to foot, able to df/pf against resistance. Brisk cap refill.     LEFT hip: no focal tenderness or diffuse tenderness. Not tender to greater trochanteric or iliac crest. No bruising. No swelling. Able to start the straight leg raise but complains of pain. Difficulty to fully straight leg raise due to pain to hip.  There is  pain with iliopsoas testing. Non-tender to knee. No pain with flexion/extension of knee.  Mild pain with internal rotation of hip. No pain with external rotation. Non-tender to calf, soft.  2+ distal pulse, sensation intact to foot, able to df/pf against resistance without pain. Brisk cap refill.            Data:     Results for orders placed or performed during the hospital encounter of 01/13/19 (from the past 24 hour(s))   Care Transition RN/SW IP Consult    Narrative    Ping Lloyd     1/15/2019  4:08 PM  CARE TRANSITION SOCIAL WORK INITIAL  ASSESSMENT:      Met with: Patient.    DATA  Principal Problem:    Closed comminuted fracture of right hip (H)  Active Problems:    Chronic ischemic heart disease    Hyperlipidemia LDL goal <100    Hypertension goal BP (blood pressure) < 140/80    Adjustment disorder with anxiety    Hyperplasia of prostate    Closed right hip fracture (H)       Primary Care Clinic Name: [facility]   Primary Care MD Name: Dr. Yoo   Contact information and PCP information verified: Yes      ASSESSMENT  Cognitive Status: awake, alert and oriented.       Resources List: Skilled Nursing Facility              Description of Support System: Supportive, Involved   Who is your support system?: Wife, Children       Insurance Concerns: No Insurance issues identified        This writer met with patient and his wife, Estelita and introduced   self and role. Discussed discharge planning and medicare   guidelines in regards to home care and SNF benefits. Discussed   current recommendation for TCU placement.  Patient and wife in   agreement.  Patient was provided with Medicare certified nursing   home list. Pts choices are as follows [facility]   (Main Phone: [phone] Admissions Phone: [phone] Fax:   [phone]) [facility]  (Phone: [phone] Fax:   [phone]) [facility] (Admissions:   [phone] Main Phone: [phone] Fax: [phone]) and   [facility].      Discussed that [facility] is full and does not anticipate any   male openings until the end of next week. Discussed that [facility] has a shared room in Long Term Care and patient and wife not   interested.    Discussed that [facility] has a private room with a private   bathroom with a $14.00 private room charge.  Patient and wife in   agreement with this option and writer has confirmed the bed at   [facility] for tomorrow.     Wife is requesting van transport and is aware of the private cost   of  $95.00 for this.  TCU bed is not available at [facility]   until after 1pm tomorrow, so writer has arranged [employer] van for   transport at 1pm.          PLAN    Patient will discharge to TCU at [facility] when medically   stable. Anticipate possible discharge for tomorrow.          Ping Lloyd, CRISTÓBAL      XR Pelvis w Hip Left 1 View    Narrative    PELVIS WITH UNILATERAL HIP ONE VIEW LEFT  1/15/2019 5:45 PM     HISTORY: Left hip pain. Fall with a right hip fracture. Increasing  left hip pain and reluctance to bear weight.    COMPARISON: January 13, 2019      Impression    IMPRESSION: ORIF of an intertrochanteric fracture on the right noted  in near-anatomic alignment.    ANGELINE KAPOOR MD   CK total   Result Value Ref Range    CK Total 1,489 (HH) 30 - 300 U/L   Lactic acid whole blood   Result Value Ref Range    Lactic Acid 1.2 0.7 - 2.0 mmol/L   CBC with platelets   Result Value Ref Range    WBC 9.5 4.0 - 11.0 10e9/L    RBC Count 2.74 (L) 4.4 - 5.9 10e12/L    Hemoglobin 8.2 (L) 13.3 - 17.7 g/dL    Hematocrit 25.2 (L) 40.0 - 53.0 %    MCV 92 78 - 100 fl    MCH 29.9 26.5 - 33.0 pg    MCHC 32.5 31.5 - 36.5 g/dL    RDW 13.2 10.0 - 15.0 %    Platelet Count 132 (L) 150 - 450 10e9/L   CRP inflammation   Result Value Ref Range    CRP Inflammation 175.0 (H) 0.0 - 8.0 mg/L   Basic metabolic panel   Result Value Ref Range    Sodium 137 133 - 144 mmol/L    Potassium 3.8 3.4 - 5.3 mmol/L    Chloride 106 94 - 109 mmol/L    Carbon Dioxide 25 20 - 32 mmol/L    Anion Gap 6 3 - 14 mmol/L    Glucose 112 (H) 70 - 99 mg/dL    Urea Nitrogen 15 7 - 30 mg/dL    Creatinine 0.97 0.66 - 1.25 mg/dL    GFR Estimate 69 >60 mL/min/[1.73_m2]    GFR Estimate If Black 79 >60 mL/min/[1.73_m2]    Calcium 7.8 (L) 8.5 - 10.1 mg/dL   Hemoglobin   Result Value Ref Range    Hemoglobin 8.0 (L) 13.3 - 17.7 g/dL   Glucose   Result Value Ref Range    Glucose 105 (H) 70 - 99 mg/dL   CK total   Result Value Ref Range    CK Total 1,251 (HH) 30 - 300 U/L      Lan Oquendo, APRN, CNP  Orthopedic Surgery

## 2019-01-16 NOTE — PLAN OF CARE
Discharge Planner OT   Patient plan for discharge: TCU  Current status: Pt demonstrated decreased orientation and participation in therapy session this date. Pt appeared to be actively hallucinating during session and was reaching out toward hallucinations and mumbling about situations not relevant to conversation. Pt required consistent verbal and tactile cues to complete AAROM exercises. Pt was unable to feed self during session with setup, verbal/tactile cues, or hand over hand assist.   Barriers to return to prior living situation: Level of assist; cognition; medical needs  Recommendations for discharge: TCU  Rationale for recommendations: Pt would benefit from further skilled OT services within the TCU setting to increase independence with ADLs and progress toward prior level of function. Should pt continue to demonstrate decreased participation in therapy sessions and require increased level of assistance with ADL/IADL tasks, long term care may also be appropriate.        Entered by: Bren Rojas 01/16/2019 1:42 PM

## 2019-01-16 NOTE — PROGRESS NOTES
Son, Pasquale, spoke with Susie in admissions at [facility] this morning.  She was able to offer a male long term care bed that has become available today.  Patient and family in agreement with this option.      Writer has sent full assessment information to [facility] for Susie to review.  It is undetermined if patient will still be ready for discharge today or if it will be tomorrow, as he has an MRI scheduled for today.

## 2019-01-16 NOTE — PLAN OF CARE
S-(situation): Shift note    B-(background): Rt hip fracture    A-(assessment): /49 (BP Location: Right arm)   Pulse 89   Temp 98.8  F (37.1  C) (Oral)   Resp 18   Ht 1.829 m (6')   Wt 99.3 kg (218 lb 14.7 oz)   SpO2 94%   BMI 29.69 kg/m  . Pt pleasantly confused. Condom cath used but patient removed. Incontinent x3. Pain controlled with dilaudid x1, oxy x1 and tylenol. LSC. Incision dressing is CDI with no new drainage.     R-(recommendations): Continue to monitor per care plan.

## 2019-01-16 NOTE — PROGRESS NOTES
DATE:  1/16/2019   TIME OF RECEIPT FROM LAB:  758am  LAB TEST:  CK  LAB VALUE:  1251  RESULTS GIVEN WITH READ-BACK TO (PROVIDER):  samimian  TIME LAB VALUE REPORTED TO PROVIDER:   758am

## 2019-01-16 NOTE — PLAN OF CARE
Discharge Planner PT   Patient plan for discharge: TCU  Current status: Patient remains confused, unable to state WB restriction to RLE. Patient remains with poor command following requiring single commands with tactile cues and time to process. Due to NWB LLE and FFWB RLE did not mobilize patient out of bed. Completed RLE LE HEP with progress towards good hip flexion AROM from PROM.    Barriers to return to prior living situation: Medical status, left hip pain, WB status  Recommendations for discharge: TCU  Rationale for recommendations: Patient remains with impaired insight as well as memory of WB status and hip fracture/surgery. Patient would benefit from placement for safe discharge and skilled therapeutic intervention to progress him towards higher level of function, safely, in accordance with his surgeon's protocol.       Entered by: Britni Pelayo 01/16/2019 12:02 PM

## 2019-01-16 NOTE — PROGRESS NOTES
"[facility]    Medicine Progress Note - Hospitalist Service       Date of Admission:  1/13/2019  Assessment & Plan      Closed comminuted fracture of right hip (H)  POD #2  Pain is managed in right hip  pain management with dilaudid and Norco as needed      Left hip pain  Left Thigh Pain - new since patient tried to stand with physical therapy.  Patient describes it as a burning, throbbing ache \"deep\" in the \"neck of the hip\".  Inner thigh/groin area. Does not radiate, no numbness/tingling.  Worse with movement, weight bearing.    left hip no bruising, swelling, skin breakdown. No rash.  Non-tender through greater trochanteric, lateral, anterior thigh. No focal or specific tenderness.  No tenderness or pain at knee.  Posterior calf is soft-nontender.  Mild pain with hip flexion >90.  Denies pain with external rotation, internal rotation recreates pain.  Distal neurovascular grossly intact.  Sensation intact.  Palpable pulse.  Skin pink warm dry.  Good cap refill.     XR ordered - no fracture  MRI could not be done due to patient being confused and not staying still  Monitor for now.     Rhabdomyolysis:  Secondary to fall:  CK elevated. Now trending down.  Will continue IV Fluids and recheck labs in the morning.   Decrease the LR to 50 ml /hr    Acute encephalopathy, possible metabolic  Unclear etiology at this time  The patient has become more and more confused and has hallucinations  Could be due to medications/ pain/ recent surgery/ possible underlying dementia and superimposed delirium/? Infection/ ? Stroke/ ICH/ ACS.  Will proceed with UA, chest X ray, troponin, EKG, CT Brain WO.  Neuro checks  Haldol as needed      Chronic ischemic heart disease    Assessment: stable    EKG- sinus tachycardia  Check troponin (given altered mental state)      Hyperlipidemia LDL goal <100    Assessment: stable    Plan: continue home dose statin      Hypertension goal BP (blood pressure) < 140/80    " Assessment: stable    Plan: continue home meds      Adjustment disorder with anxiety    Assessment: stable    Plan: continue home meds      Hyperplasia of prostate    Assessment: he doesn't have any issues with urination right now    Plan: continue home meds        Diet: Regular Diet Adult  Room Service    DVT Prophylaxis: Pneumatic Compression Devices/ Lovenox  Ordonez Catheter: not present    Disposition Plan   Expected discharge: 2 - 3 days, recommended to transitional care unit once adequate pain management/ tolerating PO medications, hemoglobin stable, mental status at baseline and safe disposition plan/ TCU bed available.  Entered: Valdo Ahn MD 01/16/2019, 2:37 PM       The patient's care was discussed with the primary team and RN.    Valdo Ahn MD  Hospitalist Service  [facility]    ______________________________________________________________________    Interval History   Patient is more confused today and has some hallucinations. He is unable to provide hx at this time since he has received some Ativan for MRI. MRI could not be completed since the patient was agitated and restless.     Data reviewed today: I reviewed all medications, new labs and imaging results over the last 24 hours.      Physical Exam   Vital Signs: Temp: 98.1  F (36.7  C) Temp src: Oral BP: 125/55 Pulse: 89 Heart Rate: 89 Resp: 16 SpO2: 92 % O2 Device: None (Room air)    Weight: 218 lbs 14.67 oz  Constitutional: somnolent and wakes up to call, has hallucinations and trying to reach his hand above.   Respiratory: No increased work of breathing, good air exchange, clear to auscultation bilaterally, no crackles or wheezing, Mild respiratory distress and diminished  Cardiovascular: regular rate and rhythm  GI: normal bowel sounds  Skin: pale, multiple areas of bruising  Musculoskeletal: right leg edema, incision is intact.  Neurologic:somnolent and wakes up to call briefly. Is confused and  hallucinating. Is able to move all extremities. No facial asymmetry.      Data   Recent Labs   Lab 01/16/19  0521 01/15/19  1805 01/15/19  0612 01/14/19  0519 01/13/19  1813   WBC  --  9.5  --  9.9 15.7*   HGB 8.0* 8.2* 9.5* 10.9* 13.5   MCV  --  92  --  90 90   PLT  --  132*  --  204 227   NA  --  137  --  142 143   POTASSIUM  --  3.8  --  4.3 3.9   CHLORIDE  --  106  --  112* 110*   CO2  --  25  --  24 23   BUN  --  15  --  18 18   CR  --  0.97  --  1.09 1.35*   ANIONGAP  --  6  --  6 10   ALEJO  --  7.8*  --  7.9* 8.9   * 112* 117* 128* 128*   ALBUMIN  --   --   --   --  3.6   PROTTOTAL  --   --   --   --  6.9   BILITOTAL  --   --   --   --  0.3   ALKPHOS  --   --   --   --  98   ALT  --   --   --   --  18   AST  --   --   --   --  18     Recent Results (from the past 24 hour(s))   XR Pelvis w Hip Left 1 View    Narrative    PELVIS WITH UNILATERAL HIP ONE VIEW LEFT  1/15/2019 5:45 PM     HISTORY: Left hip pain. Fall with a right hip fracture. Increasing  left hip pain and reluctance to bear weight.    COMPARISON: January 13, 2019      Impression    IMPRESSION: ORIF of an intertrochanteric fracture on the right noted  in near-anatomic alignment.    ANGELINE KAPOOR MD

## 2019-01-16 NOTE — PROGRESS NOTES
Discharge Planner   Discharge Plans in progress: Yes. Plan to discharge to [facility] 1/16 or 1/17.  Handivan to transport.   Barriers to discharge plan: None   Follow up plan: Care Transitions to continue to follow for discharge planning.        Entered by: JODEE DHALIWAL 01/16/2019 11:03 AM

## 2019-01-16 NOTE — PLAN OF CARE
Patient complained of pain to left thigh this afternoon, stating it was worse than his incision pain. Ortho notified. X-ray and labs ordered. Tylenol and oxycodone given for pain as needed. Transferred to bed from chair with the sera-steady and tolerated well.

## 2019-01-16 NOTE — PLAN OF CARE
Patient's mentation has been fluctuating frequently throughout today as well as yesterday; with a positive delirium score currently. Limiting opoid pain medications in favor for alternative medications and therapies. Patient declines pain most often however does have increased pain with activity; however end of shift patient is unable to carry an appropriate conversation however does not appear to be in pain at rest or during turning and repositioning. Patient did attempt at MRI this afternoon however he became more confused and uncooperative; Ativan given per MD order with no improvement; imaging unable to be obtained. Patient back to room agitated, speech is clear however rambling and unable to answer yes/no questions with reliability. Picking at the air with hands as well as picking at bedding and gown, attempting to sit up. MD updated; new orders given. IVF decreased to 50ml/hr as CK labs have improved and are trending down. EKG obtained. Head CT & CXR ordered and awaiting. Haldol given per MD order as well. Aquacel dressing to right hip/leg clean dry and intact. Bruising in right inner groin and testicles noted. Continues with urinary incontinence however had continent bowel movement this morning after using liza steady to assist patient to bedside commode; extremely difficult to get back up off commode- updated therapy who agreed to change patient to a lift at this time. Moved to room 250 with ceiling lift when he came back from MRI. Family updated; patient's discharge on hold at this time; possibly discharging tomorrow to short term rehab facility.

## 2019-01-16 NOTE — PROVIDER NOTIFICATION
.DATE:  1/15/2019   TIME OF RECEIPT FROM LAB:  1900  LAB TEST:  CK  LAB VALUE:  1489  RESULTS GIVEN WITH READ-BACK TO (PROVIDER):  Dr. Kim  TIME LAB VALUE REPORTED TO PROVIDER:   1910

## 2019-01-16 NOTE — PROGRESS NOTES
Received word from nursing patient would not tolerate MRI, uncooperative and moving. Per nursing patient was having a hard time relaxing on MRI bed and 1.0mg of ativan was ordered by hospitalist and this was given.  Was still not able to obtain an MRI    Saw and examined patient:  /55   Pulse 89   Temp 98.1  F (36.7  C) (Oral)   Resp 16   Ht 1.829 m (6')   Wt 99.3 kg (218 lb 14.7 oz)   SpO2 92%   BMI 29.69 kg/m    Currently patient is alert to name only, confused by location, event, time.  Patient reaching out, and fidgeting. Increased confusion from earlier.  Currently denies pain, but I do not think he is understanding the question.       Discussed with on call surgeon and family.  Clinically has no focal tenderness, no swelling, no bruising.  Pain with iliopsoasis muscle use but is able to straight leg raise.  Discussed risks and benefits of a sedated MRI.  Because he did not tolerate benzo, likely he would need a deep level of sedation. Discussed this family and they agree that the benefits would not outweigh the risks at this time.  Also this would not be able to accomplished at [facility] per anesthesia.    We discussed following clinically and readdressing if pain continues on follow-up. Otherwise recommend activity as tolerated, ice, rest, and physical therapy to left hip.  Family agreeable to this plan.       Discussed with medicine team about increased confusion since ativan administration.  They will continue to monitor.    RENETTA Haddad, CNP  Orthopedic Surgery

## 2019-01-17 NOTE — PROGRESS NOTES
DATE:  1/17/2019   TIME OF RECEIPT FROM LAB:  0652  LAB TEST:  CK   LAB VALUE:  1,305  RESULTS GIVEN WITH READ-BACK TO (PROVIDER):  Kim  TIME LAB VALUE REPORTED TO PROVIDER:   0653

## 2019-01-17 NOTE — PROGRESS NOTES
[facility]  Orthopedics Progress Note    Patient seen and examined with Dr. Stanford         Assessment and Plan:    Assessment:   Post-operative day #3 Procedure(s):  OPEN REDUCTION INTERNAL FIXATION HIP NAILING   Acute post-operative blood loss anemia-asymptomatic   Increasing confusion - medicine managing  Left hip pain - unable to obtain MRI  Elevated CK: slight increase from yesterday, medicine managing      Plan:   No excessive bleeding  Pain controlled  Left hip pain: will continue to follow, unable to obtain MRI physical therapy, WBAT, ice, and pain control.   Encourage IS  Start or continue physical therapy  Activity as tolerated  Toe touch for balance only on right.   Discharge pending on medical progress and   Advance diet as tolerated  Routine wound care - Aquacel, hip spica wrap - if wrap becomes soiled or dirty - remove and rewrap as much as thigh only.   DVT Prophylaxis: Lovenox 21 days, SCD's, Compression Hose  No acute medical issues.            Interval History:   Spoke with nursing.  Confusion increased and continues.  Medicine working this up.  Suspect delirium. Per nursing last night, patient removed clothing, Aquacel, and then picked out 2 staples from distal incision. Unwitnessed. Nursing replaced Aquacel.Discussed with surgeon, will hold off on antibiotics for now, with patient picking at wound and self removing staples, wound still approximated close, no drainage.   Patient has been hard to redirect.  Per nursing has been receiving occasional Haldol.  Patient will complain of occasional right and left hip pain.  No new injuries.    Discussed with medicine: they are continuing to work up confusion, possible delirium, as well as optimize medication without use of Haldol.  Surgically ok to discharge, but not medically optimized yet.  Discharge will be pending on medical optimization therapies recommending TCU. This is reasonable.     In regards to left hip will continue to follow  "clinically.  Today was willing to bear weight without pain.   Reinforced with nursing ice, elevation, and pain control for both hips; but to reduce narcotics as able.            Review of Systems:    The patient denies any chest pain, shortness of breath, excessive pain, fever, chills, purulent drainage from the wound, nausea or vomiting.               Physical Exam:   General: awake, confusion, orient to person, and place (knew he was in a hospital), disorient to events, time, specifics of location.  Making confused statements.  Following directions.   Blood pressure 176/78, pulse 102, temperature 98.2  F (36.8  C), temperature source Axillary, resp. rate 20, height 1.829 m (6'), weight 99.3 kg (218 lb 14.7 oz), SpO2 93 %.     Right hip:  Dressing clean, dry and intact. Surrounding skin intact, no breakdown. Aquacel removed, no drainage. Proximal incision approximated, all staples in place.  Distal incision 1 staple mid incision is in place, other staples missing, one staple is 3/4 removed.  Wound approximated. Not draining. No erythema, no streaking, no evidence of infection. Cleaned distal wound with betadine, then removed partially removed staple. Wound still approximated and not draining. Placed sterile steri-strips over incision. Covered both incisions with new sterile Aquacel, then stood patient up with Sure-Stand. Patient bearing weight bilateral legs, more so on the left, without complaint of pain.  Patient stated standing \"felt good\". Placed ACE wrap to form hip spica wrap around the right hip and pelvis. Patient moved to chair.  There is some increased bruising to inner right thigh, and into groin.  Mild swelling to the right leg. Compartments are soft and non-tender. 2+ distal pulse, sensation intact to foot, able to df/pf against resistance. Brisk cap refill.     LEFT hip re-examined today: no focal tenderness or diffuse tenderness. Not tender to greater trochanteric or iliac crest. No bruising. No " swelling. Able to start the straight leg raise but complains of pain. Non-tender to knee. No pain with flexion/extension of knee. With assist x 2, move to side of bed, stood up, without complaint of pain. Non-tender to calf, soft.  2+ distal pulse, sensation intact to foot, able to df/pf against resistance without pain. Brisk cap refill.            Data:     Results for orders placed or performed during the hospital encounter of 01/13/19 (from the past 24 hour(s))   Troponin I   Result Value Ref Range    Troponin I ES 0.031 0.000 - 0.045 ug/L   XR Chest Port 1 View    Narrative    CHEST PORTABLE ONE VIEW   1/16/2019 3:37 PM     HISTORY: Delirium.    COMPARISON: Chest x-rays dated 1/13/2019 and 2/21/2017.    FINDINGS:  There is right perihilar prominence and there is widening  of the upper mediastinum which could represent prominence of the  ascending aorta, aortic dissection, or more likely artifact due to  technique on today's study. The lungs are otherwise grossly clear. No  pneumothorax or significant pleural fluid collections identified. No  fracture seen.      Impression    IMPRESSION:  1. Prominence of the mediastinum on today's study likely is secondary  to artifact due to positioning of the patient. If there is clinical  concern for dissection or aneurysmal dilatation of the thoracic aorta  which would be new since the prior study 3 days earlier, then further  evaluation with CT chest would be recommended.  2. No other evidence of acute cardiopulmonary disease is seen.    CRUZ REAL MD   CT Head w/o contrast*    Narrative    CT SCAN OF THE HEAD WITHOUT CONTRAST   1/16/2019 4:13 PM     HISTORY: Altered level of consciousness (LOC), unexplained.    TECHNIQUE: Axial images of the head and coronal reformations without  IV contrast material. Radiation dose for this scan was reduced using  automated exposure control, adjustment of the mA and/or kV according  to patient size, or iterative reconstruction  technique.    COMPARISON: 8/1/2016    FINDINGS: There is generalized atrophy of the brain. There is low  attenuation in the white matter of the cerebral hemispheres consistent  with sequelae of small vessel ischemic disease. There is no evidence  of intracranial hemorrhage, mass, acute infarct or anomaly.     The visualized portions of the sinuses and mastoids appear normal.  There is no evidence of trauma.      Impression    IMPRESSION:   1. No acute abnormality.  2. Atrophy of the brain. White matter changes consistent with sequelae  of small vessel ischemic disease. This is unchanged.      SHANNON DUMONT MD   UA with Microscopic   Result Value Ref Range    Color Urine Yellow     Appearance Urine Clear     Glucose Urine Negative NEG^Negative mg/dL    Bilirubin Urine Negative NEG^Negative    Ketones Urine Negative NEG^Negative mg/dL    Specific Gravity Urine 1.013 1.003 - 1.035    Blood Urine Moderate (A) NEG^Negative    pH Urine 5.0 5.0 - 7.0 pH    Protein Albumin Urine Negative NEG^Negative mg/dL    Urobilinogen mg/dL 0.0 0.0 - 2.0 mg/dL    Nitrite Urine Negative NEG^Negative    Leukocyte Esterase Urine Negative NEG^Negative    Source Unspecified Urine     WBC Urine 1 0 - 5 /HPF    RBC Urine 12 (H) 0 - 2 /HPF    Squamous Epithelial /HPF Urine <1 0 - 1 /HPF    Mucous Urine Present (A) NEG^Negative /LPF   CBC with platelets   Result Value Ref Range    WBC 10.2 4.0 - 11.0 10e9/L    RBC Count 2.93 (L) 4.4 - 5.9 10e12/L    Hemoglobin 8.7 (L) 13.3 - 17.7 g/dL    Hematocrit 25.5 (L) 40.0 - 53.0 %    MCV 87 78 - 100 fl    MCH 29.7 26.5 - 33.0 pg    MCHC 34.1 31.5 - 36.5 g/dL    RDW 12.5 10.0 - 15.0 %    Platelet Count 178 150 - 450 10e9/L   Basic metabolic panel   Result Value Ref Range    Sodium 138 133 - 144 mmol/L    Potassium 3.3 (L) 3.4 - 5.3 mmol/L    Chloride 105 94 - 109 mmol/L    Carbon Dioxide 25 20 - 32 mmol/L    Anion Gap 8 3 - 14 mmol/L    Glucose 114 (H) 70 - 99 mg/dL    Urea Nitrogen 10 7 - 30 mg/dL     Creatinine 0.81 0.66 - 1.25 mg/dL    GFR Estimate 78 >60 mL/min/[1.73_m2]    GFR Estimate If Black >90 >60 mL/min/[1.73_m2]    Calcium 8.4 (L) 8.5 - 10.1 mg/dL   CK total   Result Value Ref Range    CK Total 1,305 (HH) 30 - 300 U/L     Lan Oquendo APRN, CNP  Orthopedic Surgery

## 2019-01-17 NOTE — PLAN OF CARE
Patient was confused this morning, oriented to person only. Started on scheduled Seroquel. Patient is more confused and restless this afternoon, reaching for things in the air. PRN seroquel given. Was up to the chair this morning using the sera-steady, but used the ceiling lift to get back to bed. Has been incontinent of urine. Hip spica in place, patient tolerating well. Potassium replaced this morning per protocol.

## 2019-01-17 NOTE — PROGRESS NOTES
PAS-RR    Per DHS regulation, CTS team completed and submitted PAS-RR to [place] [employer] via the [employer]. CTS team advised SNF and they are aware a PAS-RR has been submitted.     CTS team reviewed with pt or health care agent that they may be contacted for a follow up appointment within 10 days of hospital discharge if SNF stay is <30 days. Contact information for [employer] was also provided.     Pt or health care agent verbalized understanding.     PAS-RR # [id number]

## 2019-01-17 NOTE — PLAN OF CARE
"Patient turned and repositioned every 2 hrs with large incontinent of urine each time. Positive for delirium. Patient cont to attempt to get out of bed. Increasing agitation, gave PRN haldol which decreased agitation. However, 2 hours later, patient yelled, \"I need something for pain.\" as he had ripped off Aquacel dressing and removed one staple. Aquacel reapplied.  Incision pink and oozing serosanguinous fluid. Gave PRN dilaudid 0.2mg. Patient now sleeping finally at 0430am.  Will cont to monitor.  "

## 2019-01-17 NOTE — PLAN OF CARE
Discharge Planner PT   Patient plan for discharge: Patient unable to state  Current status: Patient confused to situation, location and intermittently reaching for things. Patient unable to follow conversation. Patient unable to recall hip WB status. Completed LE exercises up in chair with constant verbal and tactile cues, single step with poor compliance. Sit to stand with standard walker from recliner with mod assist of 2, unable to maintain WB status and patient reporting increased pain with completing uncontrolled descent to the chair, max assist x 2 to safely return to chair. Patient unable to sequence scooting back in chair. Complete sit to stand with lzia steady for placement of sling, mod assist x 2 for lift off, stood 1 minute with knee buckling and max assist x 2 to safely return to chair.  Barriers to return to prior living situation: medical status, confusion, weakness, WB status  Recommendations for discharge: TCU  Rationale for recommendations: Patient demonstrated increased confusion as well as impaired insight and no recall of WB status and hip fracture/surgery. Patient would benefit from placement for safe discharge and skilled therapeutic intervention to progress him towards higher level of function, safely, in accordance with his surgeon's protocol.       Entered by: Britni Pelayo 01/17/2019 9:52 AM

## 2019-01-17 NOTE — PLAN OF CARE
Discharge Planner OT   Patient plan for discharge: Unknown  Current status: Pt demonstrates improved participation in therapy tasks this date. Pt completed AROM and UE exercises with verbal cues and demonstration. Note pt required hand over hand guidance for exercises yesterday and today was able to actively participate in exercises. Pt continues to demonstrate disorientation to situation and is often unable maintain attention to task without verbal cues. Pt also frequently reaches out for items in air and occasionally is impulsive and attempts to stand up out of chair. Pt completed grooming tasks of brushing teeth and washing face with min assist, verbal cues, and set up.   Barriers to return to prior living situation: Level of assist; cognition  Recommendations for discharge: TCU  Rationale for recommendations: Pt would benefit from further skilled OT services within the TCU setting to increase independence with ADLs and progress toward prior level of function. Pt presents with increased participation in therapy tasks this date.        Entered by: Bren Rojas 01/17/2019 11:24 AM

## 2019-01-17 NOTE — PROGRESS NOTES
Patient accepted for placement at [facility] when medically stable. Writer spoke with Susei in admissions today.  She is aware that patient is not ready for discharge today.  [facility] most likely would be able to accommodate a weekend admission if patient ready over the weekend.  Care Transitions to talk more with Susie tomorrow.

## 2019-01-17 NOTE — PROGRESS NOTES
[facility]    Medicine Progress Note - Hospitalist Service       Date of Admission:  1/13/2019  Assessment & Plan     Acute encephalopathy  Unclear etiology at this time however elements consistent with Delirium are now present with fluctuating LOC and periods of hyperactivity noted.  Symptoms did worsen following Ativan administration yesterday afternoon (given in order to attempt MRI of the left hip as below).  May be secondary to medications/ pain/ recent surgery/ possible mild previously undiagnosed underlying dementia.  Work up for infection, CVA and other acute etiology performed yesterday and unremarkable.  Patient more mentally clear this morning after getting several hours of sleep early this morning but still only oriented to self.  Did need one dose of Haldol last night secondary to aggitation.    Will scheduled Seroquel 12.5 mg BID and also provide Seroquel 12.5 mg every 6 hours prn agitation or aggression.  Will provide IM 2.5 mg Zyprexa as alternative is patient is unable or unwilling to take oral tablet.  Discontinue Haldol for now.  Start Delirium protocol.  Monitor patient closely.  Will want to make sure behaviors are appropriately controlled and no other etiology for cognitive status is present prior to discharge to TCU.      Rhabdomyolysis:  Secondary to fall:  CK elevated at time of admission, had been trending downward following IV fluids and patient had been drinking well so IV fluids decreased yesterday.  With increased confusion yesterday and acute worsening following Ativan administration patient has not been drinking or eating well and CK is now starting to trend upward again (CK 1,251 yesterday and 1,301 today)  Increase IV fluids to 125 ml/hr and monitor for improved oral intake and decrease as able.  Recheck CK later today as well as tomorrow to ensure ongoing improvement, recheck renal function tomorrow morning.       Closed comminuted fracture of right  "hip (H)  POD #3  Pain is managed in right hip, ortho team if following and patient is ready for TCU on their evaluation today.  As patient is not medically ready for discharge, orthopedic team is asking hospitalist to take over as primary management team given the confusion/delirium and medical complexity which is appropriate.      Left hip pain  Left Thigh Pain - was noted yesterday with work up overall not finding obvious etiology but patient was not able to stay still for MRI evaluation given level of confusion.  Seems to be much improved today on ortho evaluation with patient not having pain even with transfer from bed to chair.  Had previously described it as a burning, throbbing ache \"deep\" in the \"neck of the hip\".  Inner thigh/groin area and had been worse with movement. Does not radiate, no numbness/tingling.   XR ordered - no fracture  MRI could not be done due to patient being confused and not staying still  Monitor for now - ortho team will continue to follow during this hospitalization and even after discharge.       Chronic ischemic heart disease    Assessment: stable    EKG- sinus tachycardia, troponin negative    Continue home medications       Hyperlipidemia LDL goal <100    Assessment: stable    Plan: continue home dose statin      Hypertension goal BP (blood pressure) < 140/80    Assessment: stable overall - slightly increased during periods of increased agitation    Plan: continue home meds and monitor      Adjustment disorder with anxiety    Assessment: stable    Plan: continue home meds      Hyperplasia of prostate    Assessment: he doesn't have any issues with urination right now    Plan: continue home meds    Diet: Regular Diet Adult  Room Service    DVT Prophylaxis: Pneumatic Compression Devices/ Lovenox  Ordonez Catheter: not present       Diet: Regular Diet Adult  Room Service    DVT Prophylaxis: Enoxaparin (Lovenox) SQ and Pneumatic Compression Devices  Ordonez Catheter: not present  Code " Status: Full code    Disposition Plan   Expected discharge: 1-2 days, recommended to transitional care unit once mentation has stabilized and behaviors well controlled with PO medications, patient is eating and drinking well adequately with CK decreasing.  Entered: Stefania Lang MD 01/17/2019, 10:19 AM       The patient's care was discussed with the Bedside Nurse, Patient's Family and orthopedic team rounder.    Stefania Lang MD  Hospitalist Service  [facility]    ______________________________________________________________________    Interval History   Patient became more confused and agitated following the IV Ativan given prior to MRI attempt yesterday afternoon.  Haldol x1 needed to be given.  Patient did not eat or drink well overnight secondary to his level confusion.  Was able to get a few hours this morning and continues to be oriented only to self but is much less agitated.   patient did have a temperature of 100.0 overnight which has resolved without intervention.  Does now have sinus tachycardia in the 100-110 range.      Data reviewed today: I reviewed all medications, new labs and imaging results over the last 24 hours.    Physical Exam   Vital Signs: Temp: 98.2  F (36.8  C) Temp src: Axillary BP: 176/78 Pulse: 102 Heart Rate: 108 Resp: 20 SpO2: 93 % O2 Device: None (Room air)    Weight: 218 lbs 14.67 oz  Constitutional: awake, alert, oriented only to self and restless but cooperative with exam and re-orientation, no apparent distress, and appears stated age  Respiratory: No increased work of breathing, decreased air exchange diffusely, clear to auscultation bilaterally, no crackles or wheezing  Cardiovascular: sinus tachycardia with HR in the low 100 range noted  GI: bowel sounds present, abdomen soft and non-tender  Neurologic: awake, alert, oriented only to self, restless in the chair and has a picking behavior on his blanket with bilateral  hands  Neuropsychiatric: General: fidgeting and normal eye contact  Level of consciousness: alert / normal  Affect: pleasant and flat  Orientation: oriented only to self  Memory and insight: impaired: patient able to recall distant events only    Data   Recent Labs   Lab 01/17/19  0557 01/16/19  1533 01/16/19  0521 01/15/19  1805  01/14/19  0519 01/13/19  1813   WBC 10.2  --   --  9.5  --  9.9 15.7*   HGB 8.7*  --  8.0* 8.2*   < > 10.9* 13.5   MCV 87  --   --  92  --  90 90     --   --  132*  --  204 227     --   --  137  --  142 143   POTASSIUM 3.3*  --   --  3.8  --  4.3 3.9   CHLORIDE 105  --   --  106  --  112* 110*   CO2 25  --   --  25  --  24 23   BUN 10  --   --  15  --  18 18   CR 0.81  --   --  0.97  --  1.09 1.35*   ANIONGAP 8  --   --  6  --  6 10   ALEJO 8.4*  --   --  7.8*  --  7.9* 8.9   *  --  105* 112*   < > 128* 128*   ALBUMIN  --   --   --   --   --   --  3.6   PROTTOTAL  --   --   --   --   --   --  6.9   BILITOTAL  --   --   --   --   --   --  0.3   ALKPHOS  --   --   --   --   --   --  98   ALT  --   --   --   --   --   --  18   AST  --   --   --   --   --   --  18   TROPI  --  0.031  --   --   --   --   --     < > = values in this interval not displayed.     Recent Results (from the past 24 hour(s))   XR Chest Port 1 View    Narrative    CHEST PORTABLE ONE VIEW   1/16/2019 3:37 PM     HISTORY: Delirium.    COMPARISON: Chest x-rays dated 1/13/2019 and 2/21/2017.    FINDINGS:  There is right perihilar prominence and there is widening  of the upper mediastinum which could represent prominence of the  ascending aorta, aortic dissection, or more likely artifact due to  technique on today's study. The lungs are otherwise grossly clear. No  pneumothorax or significant pleural fluid collections identified. No  fracture seen.      Impression    IMPRESSION:  1. Prominence of the mediastinum on today's study likely is secondary  to artifact due to positioning of the patient. If there is  clinical  concern for dissection or aneurysmal dilatation of the thoracic aorta  which would be new since the prior study 3 days earlier, then further  evaluation with CT chest would be recommended.  2. No other evidence of acute cardiopulmonary disease is seen.    CRUZ REAL MD   CT Head w/o contrast*    Narrative    CT SCAN OF THE HEAD WITHOUT CONTRAST   1/16/2019 4:13 PM     HISTORY: Altered level of consciousness (LOC), unexplained.    TECHNIQUE: Axial images of the head and coronal reformations without  IV contrast material. Radiation dose for this scan was reduced using  automated exposure control, adjustment of the mA and/or kV according  to patient size, or iterative reconstruction technique.    COMPARISON: 8/1/2016    FINDINGS: There is generalized atrophy of the brain. There is low  attenuation in the white matter of the cerebral hemispheres consistent  with sequelae of small vessel ischemic disease. There is no evidence  of intracranial hemorrhage, mass, acute infarct or anomaly.     The visualized portions of the sinuses and mastoids appear normal.  There is no evidence of trauma.      Impression    IMPRESSION:   1. No acute abnormality.  2. Atrophy of the brain. White matter changes consistent with sequelae  of small vessel ischemic disease. This is unchanged.      SHANNON DUMONT MD

## 2019-01-18 PROBLEM — Z66 DNR NO CODE (DO NOT RESUSCITATE): Status: ACTIVE | Noted: 2019-01-01

## 2019-01-18 NOTE — PROGRESS NOTES
[facility] Orthopedic Post-Op Note         Assessment and Plan:    Assessment:   Post-operative day #4  delerium present  Procedure(s):  OPEN REDUCTION INTERNAL FIXATION HIP NAILING right       Plan:   Dispo to [facility] when ready - possibly tomorrow stable per orthopedics  Monitor delerium  Narcotic pain meds only if needed  Non-weightbearing or toe touch for balance right lower extremity           Interval History:   Confusion continues.  Pain is well-controlled this am.  Hip spica wrap to protect incision taken off per nursing by patient.  aquacell still present.   No fevers.               Physical Exam:   All vitals have been reviewed  Patient Vitals for the past 8 hrs:   BP Temp Temp src Pulse Heart Rate Resp   01/18/19 0740 142/77 95.9  F (35.5  C) Oral 109 109 18     I/O last 3 completed shifts:  In: 1911 [P.O.:100; I.V.:1811]  Out: -   # Pain Assessment:  Current Pain Score 1/18/2019   Patient currently in pain? denies   Pain score (0-10) -   Pain location -   Pain descriptors -   - Dean is unable to participate in a collaborative plan for pain management due to confusion/delerium.     Dressing dry and intact. primapore top of aquacell holding well.  No excessive swelling.           Data:   All laboratory/imaging data related to this surgery reviewed  Results for orders placed or performed during the hospital encounter of 01/13/19 (from the past 24 hour(s))   CK total   Result Value Ref Range    CK Total 1,240 (HH) 30 - 300 U/L   Potassium   Result Value Ref Range    Potassium 3.7 3.4 - 5.3 mmol/L   CK total   Result Value Ref Range    CK Total 937 (H) 30 - 300 U/L   Basic metabolic panel   Result Value Ref Range    Sodium 141 133 - 144 mmol/L    Potassium 3.8 3.4 - 5.3 mmol/L    Chloride 108 94 - 109 mmol/L    Carbon Dioxide 27 20 - 32 mmol/L    Anion Gap 6 3 - 14 mmol/L    Glucose 110 (H) 70 - 99 mg/dL    Urea Nitrogen 12 7 - 30 mg/dL    Creatinine 0.85 0.66 - 1.25 mg/dL    GFR Estimate 76  >60 mL/min/[1.73_m2]    GFR Estimate If Black 88 >60 mL/min/[1.73_m2]    Calcium 8.4 (L) 8.5 - 10.1 mg/dL        Emily Mcneill PA-C

## 2019-01-18 NOTE — PLAN OF CARE
"S-(situation): Physical therapy treatment per POC    B-(background): Patient is a 89 year old male day 1 status post fall resulting in right closed comminuted displaced IT hip fracture, day 0 status post right CRIF hip nailing. Patient is toe touch for transfers only. Patient found to have rhabdomyolysis and encephalopathy during his stay.  Patient has a previous medical history of chronic ischemic heart disease, OA bilateral knees, depression, anxiety, HTN and history of left TKA.    A-(assessment): Patient asleep upon PT arrival, woke to tactile and verbal stimulus. Patient agitated and confused upon waking. Several attempts made to orient patient to situation and PT treatment. Attempted to provide water as patient snoring with open mouth and he stated \"get that damn thing out of here\". Patient feel asleep promptly and refused to participate this morning.    R-(recommendations): Attempt PT treatment later this date if able, otherwise continue plan of care tomorrow.    Thank you for your referral.    Britni Pelayo, PT, DPT, ATC    [facility]    O: [phone]  E: [email or web address]        "

## 2019-01-18 NOTE — PLAN OF CARE
Discharge Planner PT   Patient plan for discharge: TCU  Current status: Patient aroused to verbal and tactile stimulus. Remains agitated, however sliding forward out of reclined chair. Completed MOD IND scooting back in chair with legs down using several attempts. Patient with UE and LE weakness, calling out in pain with use of RLE. Completed AAROM RLE post operative tasks with good range of motion, increased pain and falling asleep during tasks. Verbal cues provided and patient able to complete requested sets and repetitions. Patient continues to refuse water, requesting pop. Attempted to transfer patient to bed, however NSA request she complete a bath first and will transfer back to bed for safety following task.   Barriers to return to prior living situation: Cognition, command following, medical status  Recommendations for discharge: TCU  Rationale for recommendations: Patient demonstrated increased confusion as well as impaired insight and no recall of WB status and hip fracture/surgery. Patient would benefit from placement for safe discharge and skilled therapeutic intervention to progress him towards higher level of function, safely, in accordance with his surgeon's protocol.       Entered by: Britni Pelayo 01/18/2019 10:38 AM

## 2019-01-18 NOTE — PROGRESS NOTES
[facility] requesting nursing notes be faxed to them in the a.m about how patient did overnight.  They can not accept patient until they receive updated notes on Saturday.    Mariella Solorio [facility] [phone]/ [facility] [phone]

## 2019-01-18 NOTE — PROGRESS NOTES
S-(situation): End of Shift Note    B-(background): Right hip fx    A-(assessment): Alert, disoriented x4. Restless at the beginning of the night. Administered Seroquel which was effective. VSS. Afebrile. Tolerating RA with SATS mid 90's. LS diminished/clear. Patient reported pain/appeared to be in pain in right hip. Vicki 5mg was administered x1 and appeared to make patient comfortable. Did not seem to make him more confused.Drinking fluids but not adequate amounts. He refused to drink more than sips of water throughout the night. Patient transfers repod q2. Bowel Sounds Ax4 normoactive. Patient passing gas. No stool. Patient incontinent voiding adequate amounts urine. Attempted condom cath but he kept taking it off.    CMS intact in BLE. Dressing Aquacel intact. Scant sang. drainage observed on dressing. Patient kept taking off ACE wrap constantly so it is no long in place at the moment    R-(recommendations): Monitor VS, I&O, Labs, weight

## 2019-01-18 NOTE — PLAN OF CARE
S. End of shift report    B. R. Hip fracture with Rhabdomyolysis    A. HR and BP controlled, patient oriented to self only with confusion and restlessness throughout the shift and only able to sleep short periods. Fluids increased and 3 wet /soaked briefs this shift. Lungs clear. Incision is CDI with good CMS. Bruising to tanika area and on the r/hip and r/l upper extremities.  Fluids encouraged and patient is fed for meals with a fair appetite.     R. Will continue to monitor per POC.

## 2019-01-18 NOTE — PLAN OF CARE
Problem: Patient Care Overview  Goal: Plan of Care/Patient Progress Review  S-(situation): Occupational Therapy treatment session    B-(background): Pt is a 89 year old male being seen for OT treatment s/p R closed comminute displaced IT hip fracture and R CRIF hip nailing. Pt has been seen for OT treatment sessions throughout his inpatient hospital stay. Pt scheduled for OT session this AM.    A-(assessment): Attempted OT treatment session this AM. Pt woke to verbal cues, however was agitated and confused and refused to participate in session. Pt closed his eyes and was snoring loudly. Unable to encourage participation with tactile or verbal cues.    R-(recommendations): Will attempt OT session this PM as able or will continue OT POC tomorrow as appropriate.      Bren Rojas OTR/L  [facility]  [phone]
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
## 2019-01-18 NOTE — PROGRESS NOTES
Received Roxicodone x2 for pain. Has not appeared to be increased confused after Roxicodone given this evening. Has been resting on/off after supper. Up to BSC with liza steady lift. Incontinent of bowel and bladder. Hip spica in place, aquacelle dressing intact. Will continue with POC.

## 2019-01-19 PROBLEM — R06.03 ACUTE RESPIRATORY DISTRESS: Status: ACTIVE | Noted: 2019-01-01

## 2019-01-19 PROBLEM — J69.0 ASPIRATION PNEUMONITIS (H): Status: ACTIVE | Noted: 2019-01-01

## 2019-01-19 PROBLEM — D62 ANEMIA DUE TO BLOOD LOSS, ACUTE: Status: ACTIVE | Noted: 2019-01-01

## 2019-01-19 PROBLEM — R41.0 ACUTE DELIRIUM: Status: ACTIVE | Noted: 2019-01-01

## 2019-01-19 PROBLEM — R09.02 HYPOXEMIA: Status: ACTIVE | Noted: 2019-01-01

## 2019-01-19 PROBLEM — R94.39 ABNORMAL CARDIOVASCULAR STRESS TEST: Status: ACTIVE | Noted: 2019-01-01

## 2019-01-19 PROBLEM — R11.2 NAUSEA WITH VOMITING: Status: ACTIVE | Noted: 2019-01-01

## 2019-01-19 NOTE — PLAN OF CARE
"Patient cont to be alert to self. When writer asked if he knew where he was at he stated,\"No\". I stated, [facility]. Patient replied, \"I don't give a shit where I am.\" Patient's appeared to look defeated.   Family visited today. Brought pictures.   Small amount of bleeding under aquacel dressing noted. Dressing in tact. Patient not picking at dressing.   Pt up in chair for 3 hours, ate 10% of dinner and did drink 100% boost. Will cont to monitor.   "

## 2019-01-19 NOTE — PROGRESS NOTES
[facility] Post-Op Note         Assessment and Plan:    Assessment:   Post-operative day #5  Nausea, vomiting and loose stool  CK still elevated but decreasing.  Procedure(s):  OPEN REDUCTION INTERNAL FIXATION HIP NAILING right  Hip feeling better.  Confusion still present       Plan:   Non-weightbearing right hip - OK toe touch for balance.  Medicine further evaluating confusion, now N+V  Added influenza swab for evaluation  Keep incision covered and clean  Therapy as able  Minimize narcotic pain medication use  Accepted at [facility] when medically stable           Interval History:   Overnight patient has developed nausea vomiting and diarrhea.  Per nursing no complaints of pain in either hip.              Physical Exam:   All vitals have been reviewed  Patient Vitals for the past 8 hrs:   BP Pulse Heart Rate Resp SpO2   01/19/19 0614 144/74 -- 109 24 93 %   01/19/19 0500 (!) 125/92 112 -- 28 91 %     I/O last 3 completed shifts:  In: 2656.67 [P.O.:520; I.V.:2136.67]  Out: -   # Pain Assessment:  Current Pain Score 1/18/2019   Patient currently in pain? denies   Pain score (0-10) -   Pain location -   Pain descriptors -   - Dean is experiencing pain due to Right Total Hip Arthroplasty .Due to confusion, unable to participate in discussion    Dressing dry and intact.           Data:   All laboratory/imaging data related to this surgery reviewed  Results for orders placed or performed during the hospital encounter of 01/13/19 (from the past 24 hour(s))   CBC with platelets   Result Value Ref Range    WBC 7.9 4.0 - 11.0 10e9/L    RBC Count 2.92 (L) 4.4 - 5.9 10e12/L    Hemoglobin 8.6 (L) 13.3 - 17.7 g/dL    Hematocrit 25.3 (L) 40.0 - 53.0 %    MCV 87 78 - 100 fl    MCH 29.5 26.5 - 33.0 pg    MCHC 34.0 31.5 - 36.5 g/dL    RDW 13.0 10.0 - 15.0 %    Platelet Count 320 150 - 450 10e9/L   Basic metabolic panel   Result Value Ref Range    Sodium 141 133 - 144 mmol/L    Potassium 3.7 3.4 - 5.3  mmol/L    Chloride 107 94 - 109 mmol/L    Carbon Dioxide 23 20 - 32 mmol/L    Anion Gap 11 3 - 14 mmol/L    Glucose 136 (H) 70 - 99 mg/dL    Urea Nitrogen 23 7 - 30 mg/dL    Creatinine 1.10 0.66 - 1.25 mg/dL    GFR Estimate 59 (L) >60 mL/min/[1.73_m2]    GFR Estimate If Black 68 >60 mL/min/[1.73_m2]    Calcium 8.6 8.5 - 10.1 mg/dL   CK total   Result Value Ref Range    CK Total 499 (H) 30 - 300 U/L   Lactic acid level STAT for sepsis protocol   Result Value Ref Range    Lactate for Sepsis Protocol 1.1 0.7 - 2.0 mmol/L        Emily Mcneill PA-C

## 2019-01-19 NOTE — PROVIDER NOTIFICATION
Informed Dr. Kim pt had 125cc emesis of brown/green liquid.  Ordered zofran.  Also informed hiim that pt has audible wheezing, now requiring o2 to keep sat in 90's.  Encourage IS.

## 2019-01-19 NOTE — PROGRESS NOTES
S- Transfer to ICU from [facility].    B-R/Hip Fracture With Altered mental status    A- Brief systems assessment: Patient requiring 12L/02 to maintain sats with increased work of breathing and increased RR. Possible aspiration with emesis this am.     R- Transfer to [facility] per physician orders. Continue to monitor pt and update physician as needed.      Code status: DNR / DNI  Skin: bruising with skin abrasion on back  Fall Risk: Yes- Department fall risk interventions implemented.  Isolation: Enteric  Medication drips upon transfer: none
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
## 2019-01-19 NOTE — PLAN OF CARE
Discharge Planner OT   Patient plan for discharge: TCU when medically stable  Current status: OT attempted to activate patient into session.  Use of verbal and tactile cues to arouse, for brief moment of time.  OT discussed prior functional level with nursing.  Nursing reported per the family; David was fully independent, including care of grandchild to take to school, continued to drive IND.  Nursing reported this morning, patient presented with nausea and vomiting.  Patient remains in poor confused state and decreased alertness.  Total dependent on all cares.  No OT session this date due to his status.  Barriers to return to prior living situation: level of assist needed for BADL/IADLs, confusion, along with poor alert state  Recommendations for discharge: TCU (when medically stable and improved cognitive status)  Rationale for recommendations: OT defer treatment session until Monday 1/21/19, if patient remains in the hospital at that time.  OT to continue to advance alert state, activity tolerance and functional independence with basic cares, as able.       Entered by: Francisca Martinez 01/19/2019 9:30 AM

## 2019-01-19 NOTE — PROVIDER NOTIFICATION
Dr. Kim informed that pt has had more emesis of brown fluid, now has wet sounding respirations.  Dr. Kim is going to pt's room.

## 2019-01-19 NOTE — PROGRESS NOTES
Plan is for patient to discharge to [facility] (Main Phone: [phone] Admissions Phone: [phone] Fax: [phone]) when medically stable.  Update needs to be called to Joan [phone] Phone and updated notes faxed on Sunday from Friday night and Satuday night to Joan at [phone] Fax.  Patient will need van transport.  He is currently on O2 and trying to ween off.    CRISTÓBAL Newby  [facility] [phone]/ [facility] [phone]

## 2019-01-19 NOTE — PLAN OF CARE
S-(situation): End of shift note    B-(background): Hip fracture    A-(assessment): /66 (BP Location: Left arm)   Pulse 91   Temp 97.8  F (36.6  C) (Axillary)   Resp 18   Ht 1.829 m (6')   Wt 99.3 kg (218 lb 14.7 oz)   SpO2 92%   BMI 29.69 kg/m  . Pt confused to time, place and situation. Redirection provided. Seroquel given x 1 for agitation. Repositioned q 2 hrs while awake.Toradol given to control pain.  LSC at beginning of shift and on RA. Loose nonproductive cough.  N&V x3, brown. IV Zofran given. O2 dropped to 84% shortly after requiring 4L O2 to maintain 92%. MD notified. Chest xray and nebs ordered. IS in use x2.  Last emesis was thick and full of phlegm, O2 sats improved. Tylenol held due to upset stomach.  Right hip incision covered with aquacel dressing. Rt leg, hip and testicles have moderate bruising.     R-(recommendations): Continue to monitor per care plan

## 2019-01-19 NOTE — PROGRESS NOTES
Right wrist and Left wrist restraints initiated on patient on 1/19/2019 at 1335 PM    Clinical Justification: Pulling lines, pulling tubes, and pulling equipment  Less Restrictive Alternative: Repositioning, Re-evaluate equipment, Disguise equipment, 1:1 patient care   ,     Order received: Yes     Family Notification: Spouse/significant other   Criteria explained to Patient and spouse  Patient's Response: No evidence of learning  Restraint care Plan initiated: Yes    Pamela Velasquez

## 2019-01-19 NOTE — PLAN OF CARE
Discussed with nursing and OT that pt is on hold today so no PT due to medical status. Will attempt to see pt tomorrow if ready.    Physical Therapy Discharge Summary    Reason for therapy discharge:    patient     Progress towards therapy goal(s). See goals on Care Plan in [facility] electronic health record for goal details.  Goals not met.  Barriers to achieving goals:   patient .    Therapy recommendation(s):    N/A    Thank you for your referral.    Britni Pelayo, PT, DPT, ATC    [facility]    O: [phone]  E: [email or web address]

## 2019-01-20 NOTE — DEATH PRONOUNCEMENT
MD DEATH PRONOUNCEMENT    Called to pronounce Dean Mccarty dead.    Physical Exam: Unresponsive to noxious stimuli, Spontaneous respirations absent, Breath sounds absent, Carotid pulse absent, Heart sounds absent and Pupillary light reflex absent    Patient was pronounced dead at 6:10 PM, 2019.    Principal Problem:    Closed comminuted fracture of right hip (H)  Active Problems:    Chronic ischemic heart disease    Adjustment disorder with anxiety    Acute delirium    DNR no code (do not resuscitate)    Acute respiratory distress    Hypoxemia    Anemia due to blood loss, acute    Nausea with vomiting    Abnormal cardiovascular stress test  - small area inducible inferior ischemia    Hyperlipidemia LDL goal <100    Hypertension goal BP (blood pressure) < 140/80    Hyperplasia of prostate    Pain of left thigh       Infectious disease present?: YES    Communicable disease present? (examples: HIV, chicken pox, TB, Ebola, CJD) :  NO    Multi-drug resistant organism present? (example: MRSA): NO    Please consider an autopsy if any of the following exist:  YES Unexpected or unexplained death during or following any dental, medical, or surgical diagnostic treatment procedures.   NO Death of mother at or up to seven days after delivery.     NO All  and pediatric deaths.     NO Death where the cause is sufficiently obscure to delay completion of the death certificate.   NO Deaths in which autopsy would confirm a suspected illness/condition that would affect surviving family members or recipients of transplanted organs.     The following deaths must be reported to the 's Office:  YES A death that may be due entirely or in part to any factors other than natural disease (recent surgery, recent trauma, suspected abuse/neglect).   NO A death that may be an accident, suicide, or homicide.     NO Any sudden, unexpected death in which there is no prior history of significant heart disease or any  other condition associated with sudden death.   NO A death under suspicious, unusual, or unexpected circumstances.    NO Any death which is apparently due to natural causes but in which the  does not have a personal physician familiar with the patient s medical history, social, or environmental situation or the circumstances of the terminal event.   NO Any death apparently due to Sudden Infant Death Syndrome.     NO Deaths that occur during, in association with, or as consequences of a diagnostic, therapeutic, or anesthetic procedure.   YES Any death in which a fracture of a major bone has occurred within the past (6) six months.   NO A death of persons note seen by their physician within 120 days of demise.     NO Any death in which the  was an inmate of a public institution or was in the custody of Law Enforcement personnel.   NO  All unexpected deaths of children   NO Solid organ donors   NO Unidentified bodies   NO Deaths of persons whose bodies are to be cremated or otherwise disposed of so that the bodies will later be unavailable for examination;   NO Deaths unattended by a physician outside of a licensed healthcare facility or licensed residential hospice program   NO Deaths occurring within 24 hours of arrival to a health care facility if death is unexpected.    NO Deaths associated with the decedent s employment.   NO Deaths attributed to acts of terrorism.   NO Any death in which there is uncertainty as to whether it is a medical examiner s care should be discussed with the medical investigator.        Body disposition: Autopsy was discussed with family member:  Spouse in person.  Permission for autopsy was declined.  Case referred to the .

## 2019-01-20 NOTE — DISCHARGE SUMMARY
[facility]    Death Summary  Hospitalist    Date of Admission:  1/13/2019  Date of Death:         1/19/2019  Provider Completing Death Summary: Hernandez Beasley  Date of Service (when I saw the patient): 01/19/19    Discharge Diagnoses     Closed comminuted fracture of right hip (H)    Chronic ischemic heart disease    Adjustment disorder with anxiety    Acute delirium    DNR no code (do not resuscitate)    Acute respiratory distress    Hypoxemia    Anemia due to blood loss, acute    Nausea with vomiting    Abnormal cardiovascular stress test 6/18 - small area inducible inferior ischemia    Aspiration pneumonitis (H)    Hyperlipidemia LDL goal <100    Hypertension goal BP (blood pressure) < 140/80    Hyperplasia of prostate    Pain of left thigh    * No resolved hospital problems. *      History of Present Illness   Dean Mccarty is an 89 year old male with probable underlying dementia not previously formally diagnosed who presented with right hip pain after a fall at home.   Due to suspicion for right hip fracture, he was hospitalized. See admission history and physical for details.    Hospital Course   Dean Mccarty was admitted on 1/13/2019.  The following problems were addressed during his hospitalization:    Radiographic findings were consistent with right hip fracture due to trauma from fall.  Orthopedic surgery was consulted and recommended and performed operative intervention on January 14.  Postoperatively, the patient developed worsening anemia with hemoglobin that stabilized at 8-9 subsequently.  He developed severe confusion and agitation consistent with an acute delirium, and family subsequently provided information during this hospital stay raising suspicion for underlying dementia that had not been previously diagnosed.  Behavioral disturbance trended toward improvement as he recovered postoperatively.  On the day of death, he abruptly developed new onset  recurrent vomiting with worsening respiratory status immediately following episodes of vomiting.  Emesis was not grossly bloody although was Gastroccult positive, but hemoglobin was stable.  He developed worsening respiratory distress with severe hypoxemia and was transferred to the ICU to start noninvasive positive pressure ventilation.  CODE STATUS had been confirmed as DNR/DNI.  His hemodynamic status began to deteriorate and he developed fever suspicious for possible sepsis.  In the course of diagnostic evaluation for possible sepsis, worsening hypotension, and while starting treatment for possible sepsis, the patient abruptly became apneic.  He then became pulseless.  He was pronounced dead at 6:10 PM.    Cause of death: Aspiration pneumonitis due to vomiting    Hernandez Beasley    Significant Results and Procedures   Invasive procedures: closed reduction and internal fixation of right hip fracture using gamma nail       Pending Results   Unresulted Labs Ordered in the Past 30 Days of this Admission     Date and Time Order Name Status Description    1/19/2019 1727 Blood culture In process     1/19/2019 1727 Blood culture In process           Primary Care Physician   Leobardo Yoo    Consultations This Hospital Stay   ORTHOPEDIC SURGERY IP CONSULT  ORTHOPEDIC SURGERY IP CONSULT  OCCUPATIONAL THERAPY ADULT IP CONSULT  PHYSICAL THERAPY ADULT IP CONSULT  CARE TRANSITION RN/SW IP CONSULT  PHARMACY IP CONSULT  PHYSICAL THERAPY ADULT IP CONSULT  OCCUPATIONAL THERAPY ADULT IP CONSULT    Time Spent on this Encounter   I, Hernandez Beasley, personally saw the patient today and spent greater than 30 minutes discharging this patient.    Data   Most Recent 3 CBC's:  Recent Labs   Lab Test 01/19/19  0604 01/17/19  0557 01/16/19  0521 01/15/19  1805   WBC 7.9 10.2  --  9.5   HGB 8.6* 8.7* 8.0* 8.2*   MCV 87 87  --  92    178  --  132*      Most Recent 3 BMP's:  Recent Labs   Lab Test 01/19/19  0604  01/18/19  0606 01/17/19  1526 01/17/19  0557    141  --  138   POTASSIUM 3.7 3.8 3.7 3.3*   CHLORIDE 107 108  --  105   CO2 23 27  --  25   BUN 23 12  --  10   CR 1.10 0.85  --  0.81   ANIONGAP 11 6  --  8   ALEJO 8.6 8.4*  --  8.4*   * 110*  --  114*     Most Recent 2 LFT's:  Recent Labs   Lab Test 01/13/19  1813 02/21/17  0925   AST 18 23   ALT 18 17   ALKPHOS 98 99   BILITOTAL 0.3 0.6     Most Recent INR's and Anticoagulation Dosing History:  Anticoagulation Dose History     Recent Dosing and Labs Latest Ref Rng & Units 8/1/2016    INR 0.86 - 1.14 1.01        Most Recent 3 Troponin's:  Recent Labs   Lab Test 01/16/19  1533 06/26/18  1001 08/01/16  1157   TROPI 0.031 <0.015 <0.015  The 99th percentile for upper reference range is 0.045 ug/L.  Troponin values in   the range of 0.045 - 0.120 ug/L may be associated with risks of adverse   clinical events.       Most Recent Cholesterol Panel:  Recent Labs   Lab Test 06/27/16  1138   CHOL 189   *   HDL 41   TRIG 198*     Most Recent 6 Bacteria Isolates From Any Culture (See EPIC Reports for Culture Details):  Recent Labs   Lab Test 02/21/17  1150 02/21/17  1125 02/21/17  0925 02/22/16  0905   CULT No growth after 6 days No growth No growth after 6 days No beta hemolytic Streptococcus Group A isolated     Most Recent TSH, T4 and A1c Labs:  Recent Labs   Lab Test 06/27/16  1138   TSH 3.46     Results for orders placed or performed during the hospital encounter of 01/13/19   XR Pelvis and Hip Right 2 Views    Narrative    PELVIS AND HIP RIGHT TWO VIEWS   1/13/2019 6:48 PM     HISTORY:  Trauma.    COMPARISON: Pelvis 8/1/2016    FINDINGS: Degenerative change of the lower lumbar spine.      Impression    IMPRESSION: Comminuted intertrochanteric right hip fracture with  displacement. There is also displacement of the lesser trochanter.    GIGI SIMONS MD   XR Chest 1 View    Narrative    CHEST ONE VIEW 1/13/2019 6:47 PM     HISTORY: Trauma, pre-op.      COMPARISON: 2/21/2017    FINDINGS: Small right upper lobe nodule, stable dating back to  1/19/2013. Aortic calcification. There appears to be a hiatal hernia.      Impression    IMPRESSION:  No acute consolidation.    GIGI SIMONS MD   XR Surgery EVELIO L/T 5 Min Fluoro w Stills    Narrative    SURGERY C-ARM FLUOROSCOPY LESS THAN FIVE MINUTES WITH STILLS 1/14/2019  11:51 AM     COMPARISON: Pelvis x-rays dated 1/13/2019.    HISTORY: Right hip nailing.    NUMBER OF IMAGES ACQUIRED: 6    VIEWS: 2    FLUOROSCOPY TIME: 1.3 minute(s)      Impression    IMPRESSION: Six views of the right femur demonstrate open reduction  and internal fixation of the comminuted intertrochanteric right  proximal femoral neck fracture with intramedullary bailey and dynamic  compression screw. Alignment is improved.    CRUZ REAL MD   XR Pelvis w Hip Left 1 View    Narrative    PELVIS WITH UNILATERAL HIP ONE VIEW LEFT  1/15/2019 5:45 PM     HISTORY: Left hip pain. Fall with a right hip fracture. Increasing  left hip pain and reluctance to bear weight.    COMPARISON: January 13, 2019      Impression    IMPRESSION: ORIF of an intertrochanteric fracture on the right noted  in near-anatomic alignment.    ANGELINE KAPOOR MD   CT Head w/o contrast*    Narrative    CT SCAN OF THE HEAD WITHOUT CONTRAST   1/16/2019 4:13 PM     HISTORY: Altered level of consciousness (LOC), unexplained.    TECHNIQUE: Axial images of the head and coronal reformations without  IV contrast material. Radiation dose for this scan was reduced using  automated exposure control, adjustment of the mA and/or kV according  to patient size, or iterative reconstruction technique.    COMPARISON: 8/1/2016    FINDINGS: There is generalized atrophy of the brain. There is low  attenuation in the white matter of the cerebral hemispheres consistent  with sequelae of small vessel ischemic disease. There is no evidence  of intracranial hemorrhage, mass, acute infarct or anomaly.     The  visualized portions of the sinuses and mastoids appear normal.  There is no evidence of trauma.      Impression    IMPRESSION:   1. No acute abnormality.  2. Atrophy of the brain. White matter changes consistent with sequelae  of small vessel ischemic disease. This is unchanged.      SHANNON DUMONT MD   XR Chest Port 1 View    Narrative    CHEST PORTABLE ONE VIEW   1/16/2019 3:37 PM     HISTORY: Delirium.    COMPARISON: Chest x-rays dated 1/13/2019 and 2/21/2017.    FINDINGS:  There is right perihilar prominence and there is widening  of the upper mediastinum which could represent prominence of the  ascending aorta, aortic dissection, or more likely artifact due to  technique on today's study. The lungs are otherwise grossly clear. No  pneumothorax or significant pleural fluid collections identified. No  fracture seen.      Impression    IMPRESSION:  1. Prominence of the mediastinum on today's study likely is secondary  to artifact due to positioning of the patient. If there is clinical  concern for dissection or aneurysmal dilatation of the thoracic aorta  which would be new since the prior study 3 days earlier, then further  evaluation with CT chest would be recommended.  2. No other evidence of acute cardiopulmonary disease is seen.    CRUZ REAL MD   XR Chest Port 1 View    Narrative    CHEST ONE VIEW PORTABLE   1/19/2019 9:30 AM     HISTORY: Increasing need for oxygen.    COMPARISON: 1/16/2019      Impression    IMPRESSION: Shallow inspiration, bibasilar patchy infiltrates may  indicate atelectasis. Borderline cardiomegaly is unchanged. Lordotic  projection limits detail.    SHANNON DUMONT MD   XR Chest Port 1 View    Narrative    CHEST ONE VIEW PORTABLE   1/19/2019 2:09 PM     HISTORY: Worsening respiratory distress and hypoxemia after vomiting,  question aspiration.    COMPARISON: 1/19/2019 at 9:22 AM      Impression    IMPRESSION: No change in patchy bibasilar infiltrates since this  morning, although  these are new since 1/16/2019. No new infiltrates.  Normal heart size and pulmonary vascularity.    SHANNON DUMONT MD

## 2019-01-20 NOTE — PROGRESS NOTES
At 1755 writer went into the room and patients respiratory rate was slowing on the BIpap and patient was not alert and responding to voice. Dr. Beasley and Respiratory was called and responded at 1800. At this time patient was only agonal breathing and HR was in the 40's. Patient was declared dead at 1810 by the attending

## 2019-01-22 DIAGNOSIS — E78.5 HYPERLIPIDEMIA LDL GOAL <100: ICD-10-CM

## 2019-01-22 DIAGNOSIS — I25.9 CHRONIC ISCHEMIC HEART DISEASE: ICD-10-CM

## 2019-01-22 RX ORDER — ATORVASTATIN CALCIUM 20 MG/1
TABLET, FILM COATED ORAL
Qty: 90 TABLET | Refills: 0 | OUTPATIENT
Start: 2019-01-22

## 2019-01-23 DIAGNOSIS — E78.5 HYPERLIPIDEMIA LDL GOAL <100: ICD-10-CM

## 2019-01-23 DIAGNOSIS — I25.9 CHRONIC ISCHEMIC HEART DISEASE: ICD-10-CM

## 2019-01-23 RX ORDER — ATORVASTATIN CALCIUM 20 MG/1
TABLET, FILM COATED ORAL
Qty: 90 TABLET | Refills: 0 | OUTPATIENT
Start: 2019-01-23

## 2019-01-24 DIAGNOSIS — E78.5 HYPERLIPIDEMIA LDL GOAL <100: ICD-10-CM

## 2019-01-24 DIAGNOSIS — I25.9 CHRONIC ISCHEMIC HEART DISEASE: ICD-10-CM

## 2019-01-24 RX ORDER — ATORVASTATIN CALCIUM 20 MG/1
TABLET, FILM COATED ORAL
Qty: 90 TABLET | Refills: 0 | OUTPATIENT
Start: 2019-01-24

## 2019-01-25 DIAGNOSIS — I25.9 CHRONIC ISCHEMIC HEART DISEASE: ICD-10-CM

## 2019-01-25 DIAGNOSIS — E78.5 HYPERLIPIDEMIA LDL GOAL <100: ICD-10-CM

## 2019-01-25 LAB
BACTERIA SPEC CULT: NO GROWTH
BACTERIA SPEC CULT: NO GROWTH
Lab: NORMAL
Lab: NORMAL
SPECIMEN SOURCE: NORMAL
SPECIMEN SOURCE: NORMAL

## 2019-01-25 RX ORDER — ATORVASTATIN CALCIUM 20 MG/1
TABLET, FILM COATED ORAL
Qty: 90 TABLET | Refills: 0 | OUTPATIENT
Start: 2019-01-25

## 2019-03-31 ENCOUNTER — TELEPHONE (OUTPATIENT)
Dept: OTHER | Facility: OTHER | Age: 84
End: 2019-03-31

## 2019-03-31 ENCOUNTER — NURSE TRIAGE (OUTPATIENT)
Dept: NURSING | Facility: CLINIC | Age: 84
End: 2019-03-31

## 2019-03-31 NOTE — TELEPHONE ENCOUNTER
Clinic Action Needed:  Please contact Dean us, at ; okay to leave message at that number.  Reason for Call:  Son states needs Dr. Hernandez Beasley to address some paperwork for life insurance as he was attending physician at patient's death.  Routed to:  Provider    Please close encounter when completed.

## 2019-03-31 NOTE — TELEPHONE ENCOUNTER
Son states needs Dr. Hernandez Beasley to address some paperwork for life insurance as he was attending physician at patient's death.  Routed to provider.

## 2019-04-05 ENCOUNTER — TELEPHONE (OUTPATIENT)
Dept: FAMILY MEDICINE | Facility: OTHER | Age: 84
End: 2019-04-05

## 2019-04-05 NOTE — TELEPHONE ENCOUNTER
"4/5/2019    Patients son, Pasquale Mccarty stopped in clinic today. He needs the form \"proof of death- attending physician statement\" completed by PCP for his mother (patients wife) to receive life insurance benefits. Patient passed away at [facility] 1/19/19.  Contacted Dr. Yoo's MA, K.DORY. Who will review with MD on Wednesday. Form faxed to [place].     Sons number is [phone]    Katherine Choi    "

## 2019-04-11 NOTE — TELEPHONE ENCOUNTER
Dr. Yoo unable to fill out paperwork as patients passed away after sustaining a hip fracture due to trauma (fall). Patient underwent surgery with Dr. Villareal- Hospital note indicates    On the day of death, he abruptly developed new onset recurrent vomiting with worsening respiratory status immediately following episodes of vomiting.  Emesis was not grossly bloody although was Gastroccult positive, but hemoglobin was stable.  He developed worsening respiratory distress with severe hypoxemia and was transferred to the ICU to start noninvasive positive pressure ventilation.  CODE STATUS had been confirmed as DNR/DNI.  His hemodynamic status began to deteriorate and he developed fever suspicious for possible sepsis.  In the course of diagnostic evaluation for possible sepsis, worsening hypotension, and while starting treatment for possible sepsis, the patient abruptly became apneic.  He then became pulseless.  He was pronounced dead at 6:10 PM.    Writer will connect with [facility]CS for specialty to see if Dr. Villareal can complete the paperwork.     Katherine Choi

## 2019-04-17 NOTE — TELEPHONE ENCOUNTER
I have attempted to contact this patient by phone with the following results: no answer.    I attempted to contact son listed below 2 times, there was no answer and no voicemail available. I have this form at my desk. I plan to mail it tomorrow.   Elisabet Dickens MA     4/17/2019
